# Patient Record
Sex: FEMALE | Race: WHITE | Employment: OTHER | URBAN - METROPOLITAN AREA
[De-identification: names, ages, dates, MRNs, and addresses within clinical notes are randomized per-mention and may not be internally consistent; named-entity substitution may affect disease eponyms.]

---

## 2017-02-03 PROBLEM — R26.2 UNABLE TO AMBULATE: Status: ACTIVE | Noted: 2017-02-03

## 2017-02-03 PROBLEM — I89.0 LYMPHEDEMA OF BOTH LOWER EXTREMITIES: Status: ACTIVE | Noted: 2017-02-03

## 2017-07-18 PROBLEM — I50.33 ACUTE ON CHRONIC DIASTOLIC (CONGESTIVE) HEART FAILURE (HCC): Status: ACTIVE | Noted: 2017-07-18

## 2017-11-03 PROBLEM — I48.0 PAROXYSMAL ATRIAL FIBRILLATION (HCC): Status: ACTIVE | Noted: 2017-11-03

## 2017-11-03 PROBLEM — E78.2 MIXED HYPERLIPIDEMIA: Status: ACTIVE | Noted: 2017-11-03

## 2017-11-03 PROBLEM — I50.32 CHRONIC DIASTOLIC HEART FAILURE (HCC): Status: ACTIVE | Noted: 2017-07-18

## 2017-11-03 PROBLEM — I31.39 PERICARDIAL EFFUSION: Status: ACTIVE | Noted: 2017-11-03

## 2017-11-03 PROBLEM — I44.0 FIRST DEGREE ATRIOVENTRICULAR BLOCK: Status: ACTIVE | Noted: 2017-11-03

## 2017-11-19 PROBLEM — R07.9 CHEST PAIN: Status: ACTIVE | Noted: 2017-11-19

## 2017-11-20 PROBLEM — R07.2 PRECORDIAL PAIN: Status: ACTIVE | Noted: 2017-11-19

## 2017-12-14 PROBLEM — R60.1 ANASARCA: Status: ACTIVE | Noted: 2017-12-14

## 2018-02-21 PROBLEM — R26.2 UNABLE TO AMBULATE: Status: RESOLVED | Noted: 2017-02-03 | Resolved: 2018-02-21

## 2018-03-07 PROBLEM — I48.0 PAROXYSMAL ATRIAL FIBRILLATION (HCC): Chronic | Status: ACTIVE | Noted: 2017-11-03

## 2018-03-14 ENCOUNTER — TELEPHONE (OUTPATIENT)
Dept: FAMILY MEDICINE CLINIC | Age: 73
End: 2018-03-14

## 2018-03-14 DIAGNOSIS — R73.03 PREDIABETES: ICD-10-CM

## 2018-03-14 DIAGNOSIS — I10 ESSENTIAL HYPERTENSION: Primary | Chronic | ICD-10-CM

## 2018-03-14 RX ORDER — LISINOPRIL 5 MG/1
5 TABLET ORAL DAILY
Qty: 30 TABLET | Refills: 3 | Status: SHIPPED | OUTPATIENT
Start: 2018-03-14 | End: 2018-03-14 | Stop reason: SDUPTHER

## 2018-03-14 RX ORDER — GLUCOSAMINE HCL/CHONDROITIN SU 500-400 MG
CAPSULE ORAL
Qty: 100 STRIP | Refills: 3 | Status: SHIPPED | OUTPATIENT
Start: 2018-03-14

## 2018-03-14 RX ORDER — LANCETS 30 GAUGE
EACH MISCELLANEOUS
Qty: 100 EACH | Refills: 3 | Status: ON HOLD
Start: 2018-03-14 | End: 2020-05-06 | Stop reason: HOSPADM

## 2018-03-21 ENCOUNTER — TELEPHONE (OUTPATIENT)
Dept: OCCUPATIONAL THERAPY | Age: 73
End: 2018-03-21

## 2018-03-21 NOTE — TELEPHONE ENCOUNTER
Patient no show for appointment 20March 2018. Therapist called to check on patient and to reschedule. Patient rescheduled appointment.

## 2018-03-23 ENCOUNTER — HOSPITAL ENCOUNTER (OUTPATIENT)
Age: 73
Discharge: HOME | End: 2018-03-25
Payer: MEDICARE

## 2018-03-23 ENCOUNTER — OFFICE VISIT (OUTPATIENT)
Dept: FAMILY MEDICINE CLINIC | Age: 73
End: 2018-03-23
Payer: MEDICARE

## 2018-03-23 VITALS
WEIGHT: 269 LBS | BODY MASS INDEX: 42.22 KG/M2 | RESPIRATION RATE: 20 BRPM | HEIGHT: 67 IN | DIASTOLIC BLOOD PRESSURE: 90 MMHG | SYSTOLIC BLOOD PRESSURE: 138 MMHG | HEART RATE: 57 BPM | OXYGEN SATURATION: 95 %

## 2018-03-23 DIAGNOSIS — E78.5 DYSLIPIDEMIA: Chronic | ICD-10-CM

## 2018-03-23 DIAGNOSIS — R73.03 PREDIABETES: ICD-10-CM

## 2018-03-23 DIAGNOSIS — I10 ESSENTIAL HYPERTENSION: Chronic | ICD-10-CM

## 2018-03-23 DIAGNOSIS — R60.9 PERIPHERAL EDEMA: Primary | ICD-10-CM

## 2018-03-23 DIAGNOSIS — E03.9 ACQUIRED HYPOTHYROIDISM: Chronic | ICD-10-CM

## 2018-03-23 LAB
ALBUMIN SERPL-MCNC: 4 G/DL (ref 3.5–5.2)
ALP BLD-CCNC: 79 U/L (ref 35–104)
ALT SERPL-CCNC: 12 U/L (ref 0–32)
ANION GAP SERPL CALCULATED.3IONS-SCNC: 15 MMOL/L (ref 7–16)
AST SERPL-CCNC: 14 U/L (ref 0–31)
BASOPHILS ABSOLUTE: 0.05 E9/L (ref 0–0.2)
BASOPHILS RELATIVE PERCENT: 0.7 % (ref 0–2)
BILIRUB SERPL-MCNC: 0.3 MG/DL (ref 0–1.2)
BUN BLDV-MCNC: 21 MG/DL (ref 8–23)
CALCIUM SERPL-MCNC: 9.4 MG/DL (ref 8.6–10.2)
CHLORIDE BLD-SCNC: 100 MMOL/L (ref 98–107)
CO2: 25 MMOL/L (ref 22–29)
CREAT SERPL-MCNC: 0.9 MG/DL (ref 0.5–1)
EOSINOPHILS ABSOLUTE: 0.4 E9/L (ref 0.05–0.5)
EOSINOPHILS RELATIVE PERCENT: 5.9 % (ref 0–6)
GFR AFRICAN AMERICAN: >60
GFR NON-AFRICAN AMERICAN: >60 ML/MIN/1.73
GLUCOSE BLD-MCNC: 78 MG/DL (ref 74–109)
HCT VFR BLD CALC: 42.6 % (ref 34–48)
HEMOGLOBIN: 13.6 G/DL (ref 11.5–15.5)
IMMATURE GRANULOCYTES #: 0.02 E9/L
IMMATURE GRANULOCYTES %: 0.3 % (ref 0–5)
LYMPHOCYTES ABSOLUTE: 1.29 E9/L (ref 1.5–4)
LYMPHOCYTES RELATIVE PERCENT: 18.9 % (ref 20–42)
MCH RBC QN AUTO: 29.8 PG (ref 26–35)
MCHC RBC AUTO-ENTMCNC: 31.9 % (ref 32–34.5)
MCV RBC AUTO: 93.4 FL (ref 80–99.9)
MONOCYTES ABSOLUTE: 0.61 E9/L (ref 0.1–0.95)
MONOCYTES RELATIVE PERCENT: 8.9 % (ref 2–12)
NEUTROPHILS ABSOLUTE: 4.46 E9/L (ref 1.8–7.3)
NEUTROPHILS RELATIVE PERCENT: 65.3 % (ref 43–80)
PDW BLD-RTO: 14.4 FL (ref 11.5–15)
PLATELET # BLD: 292 E9/L (ref 130–450)
PMV BLD AUTO: 9.5 FL (ref 7–12)
POTASSIUM SERPL-SCNC: 4.1 MMOL/L (ref 3.5–5)
RBC # BLD: 4.56 E12/L (ref 3.5–5.5)
SODIUM BLD-SCNC: 140 MMOL/L (ref 132–146)
TOTAL PROTEIN: 7.6 G/DL (ref 6.4–8.3)
WBC # BLD: 6.8 E9/L (ref 4.5–11.5)

## 2018-03-23 PROCEDURE — G8400 PT W/DXA NO RESULTS DOC: HCPCS | Performed by: FAMILY MEDICINE

## 2018-03-23 PROCEDURE — 36415 COLL VENOUS BLD VENIPUNCTURE: CPT

## 2018-03-23 PROCEDURE — G8427 DOCREV CUR MEDS BY ELIG CLIN: HCPCS | Performed by: FAMILY MEDICINE

## 2018-03-23 PROCEDURE — 3017F COLORECTAL CA SCREEN DOC REV: CPT | Performed by: FAMILY MEDICINE

## 2018-03-23 PROCEDURE — 99213 OFFICE O/P EST LOW 20 MIN: CPT | Performed by: FAMILY MEDICINE

## 2018-03-23 PROCEDURE — 1123F ACP DISCUSS/DSCN MKR DOCD: CPT | Performed by: FAMILY MEDICINE

## 2018-03-23 PROCEDURE — 3014F SCREEN MAMMO DOC REV: CPT | Performed by: FAMILY MEDICINE

## 2018-03-23 PROCEDURE — 4040F PNEUMOC VAC/ADMIN/RCVD: CPT | Performed by: FAMILY MEDICINE

## 2018-03-23 PROCEDURE — 1090F PRES/ABSN URINE INCON ASSESS: CPT | Performed by: FAMILY MEDICINE

## 2018-03-23 PROCEDURE — G8482 FLU IMMUNIZE ORDER/ADMIN: HCPCS | Performed by: FAMILY MEDICINE

## 2018-03-23 PROCEDURE — 84443 ASSAY THYROID STIM HORMONE: CPT

## 2018-03-23 PROCEDURE — 85025 COMPLETE CBC W/AUTO DIFF WBC: CPT

## 2018-03-23 PROCEDURE — 80053 COMPREHEN METABOLIC PANEL: CPT

## 2018-03-23 PROCEDURE — 1036F TOBACCO NON-USER: CPT | Performed by: FAMILY MEDICINE

## 2018-03-23 PROCEDURE — G8598 ASA/ANTIPLAT THER USED: HCPCS | Performed by: FAMILY MEDICINE

## 2018-03-23 PROCEDURE — G8417 CALC BMI ABV UP PARAM F/U: HCPCS | Performed by: FAMILY MEDICINE

## 2018-03-23 PROCEDURE — 84439 ASSAY OF FREE THYROXINE: CPT

## 2018-03-23 ASSESSMENT — ENCOUNTER SYMPTOMS
BLOOD IN STOOL: 0
SHORTNESS OF BREATH: 1
ABDOMINAL PAIN: 1
WHEEZING: 1
SORE THROAT: 0
BACK PAIN: 1
COUGH: 1
VOMITING: 0
TROUBLE SWALLOWING: 0

## 2018-03-23 NOTE — PATIENT INSTRUCTIONS
Patient Education        How to Read a Food Label to Limit Sodium: Care Instructions  Your Care Instructions  Sodium causes your body to hold on to extra water. This can raise your blood pressure and force your heart and kidneys to work harder. In very serious cases, this could cause you to be put in the hospital. It might even be life-threatening. By limiting sodium, you will feel better and lower your risk of serious problems. Processed foods, fast food, and restaurant foods are the major sources of dietary sodium. The most common name for sodium is salt. Try to limit how much sodium you eat to less than 2,300 milligrams (mg) a day. If you limit your sodium to 1,500 mg a day, you can lower your blood pressure even more. This limit counts all the salt that you eat in foods you cook or in packaged foods. Keep a list of everything you eat and drink. Follow-up care is a key part of your treatment and safety. Be sure to make and go to all appointments, and call your doctor if you are having problems. It's also a good idea to know your test results and keep a list of the medicines you take. How can you care for yourself at home? Read ingredient lists on food labels  · Read the list of ingredients on food labels to help you find how much sodium is in a food. The label lists the ingredients in a food in descending order (from the most to the least). If salt or sodium is high on the list, there may be a lot of sodium in the food. · Know that sodium has different names. Sodium is also called monosodium glutamate (MSG, common in [facility] food), sodium citrate, sodium alginate, sodium hydroxide, and sodium phosphate. Read Nutrition Facts labels  · On most foods, there is a Nutrition Facts label. This will tell you how much sodium is in one serving of food. Look at both the serving size and the sodium amount. The serving size is located at the top of the label, usually right under the \"Nutrition Facts\" title.  The amount

## 2018-03-23 NOTE — PROGRESS NOTES
Markell Griffin   Patient is a 67y.o. year old female who presents with:  Chief Complaint   Patient presents with    Edema     2 wk f/u      Patient also follows with: cardiology - Dr. Eliezer Guzman. HPI    Patient had sleep study scheduled 3/29/18. Edema:   3/7/18: Current treatment: Lasix 40 mg twice daily. Claims she cannot get compression stocking on therefore does not use them. Relevant history includes CKD, CHF, A. fib. States she limits sodium. Today states she tries to wrap them but has difficulty doing this, also admits that she does not keep her legs elevated. Today 3/23/18: Increased lasix to 80mg bid and referred to lymphedema clinic. Patient missed first appt 3/20/2018 and has rescheduled appt 3/27/18. Claims swelling has not improved. Tries to avoid sodium but admits to not checking nutrition labels, admits to eating canned soup, processed/prepackaged meals, is not watching sodium intake, does not use compression stockings. States a visiting nurse comes twice per week and wraps them. HTN: Current treatment: Carvedilol 3.125 mg twice daily. Recent changes in medication: None. The patient is known to have history of CAD. Last three blood pressure readings below. BP Readings from Last 3 Encounters:   03/23/18 (!) 138/90   03/07/18 (!) 146/80   02/23/18 (!) 183/98     Hypothyroidism: Current treatment: levothyroxine 200 ug daily. Recent changes in medication: None. Last lab work from 12/2017 suggests under treatment, states no change in dose was made since that time. Of note reports she takes the levothyroxine at the same time she takes any of her other medications. Most recent relevant lab results as below. Lab Results   Component Value Date    TSH 41.530 (H) 02/23/2018    TSH 48.470 (H) 02/21/2018    TSH 10.960 (H) 12/15/2017     Dyslipidemia: Current treatment: Simvastatin 40 mg daily. Recent changes in medications: none. The patient denies problems associated with the medication.

## 2018-03-24 LAB
T4 FREE: 1.13 NG/DL (ref 0.93–1.7)
TSH SERPL DL<=0.05 MIU/L-ACNC: 7.91 UIU/ML (ref 0.27–4.2)

## 2018-03-26 ENCOUNTER — TELEPHONE (OUTPATIENT)
Dept: FAMILY MEDICINE CLINIC | Age: 73
End: 2018-03-26

## 2018-03-27 ENCOUNTER — EVALUATION (OUTPATIENT)
Dept: OCCUPATIONAL THERAPY | Age: 73
End: 2018-03-27
Payer: MEDICARE

## 2018-03-27 DIAGNOSIS — I89.0 LYMPHEDEMA OF BOTH LOWER EXTREMITIES: Primary | ICD-10-CM

## 2018-03-27 PROCEDURE — 97165 OT EVAL LOW COMPLEX 30 MIN: CPT | Performed by: OCCUPATIONAL THERAPIST

## 2018-03-27 PROCEDURE — G8984 CARRY CURRENT STATUS: HCPCS | Performed by: OCCUPATIONAL THERAPIST

## 2018-03-27 PROCEDURE — G8985 CARRY GOAL STATUS: HCPCS | Performed by: OCCUPATIONAL THERAPIST

## 2018-03-28 ENCOUNTER — TELEPHONE (OUTPATIENT)
Dept: FAMILY MEDICINE CLINIC | Age: 73
End: 2018-03-28

## 2018-03-29 ENCOUNTER — HOSPITAL ENCOUNTER (OUTPATIENT)
Dept: SLEEP CENTER | Age: 73
Discharge: HOME | End: 2018-03-29

## 2018-04-05 ENCOUNTER — TELEPHONE (OUTPATIENT)
Dept: FAMILY MEDICINE CLINIC | Age: 73
End: 2018-04-05

## 2018-04-05 DIAGNOSIS — B88.8 INFESTATION BY BED BUG: Primary | ICD-10-CM

## 2018-04-05 DIAGNOSIS — Z59.9 FINANCIAL DIFFICULTIES: ICD-10-CM

## 2018-04-05 SDOH — ECONOMIC STABILITY - INCOME SECURITY: PROBLEM RELATED TO HOUSING AND ECONOMIC CIRCUMSTANCES, UNSPECIFIED: Z59.9

## 2018-04-13 ENCOUNTER — TELEPHONE (OUTPATIENT)
Dept: FAMILY MEDICINE CLINIC | Age: 73
End: 2018-04-13

## 2018-04-20 ENCOUNTER — TELEPHONE (OUTPATIENT)
Dept: FAMILY MEDICINE CLINIC | Age: 73
End: 2018-04-20

## 2018-04-20 DIAGNOSIS — Z59.9 FINANCIAL DIFFICULTIES: ICD-10-CM

## 2018-04-20 DIAGNOSIS — Z78.9 POOR PHYSICAL HEALTH: ICD-10-CM

## 2018-04-20 DIAGNOSIS — B88.8 INFESTATION BY BED BUG: Primary | ICD-10-CM

## 2018-04-20 SDOH — ECONOMIC STABILITY - INCOME SECURITY: PROBLEM RELATED TO HOUSING AND ECONOMIC CIRCUMSTANCES, UNSPECIFIED: Z59.9

## 2018-07-10 ENCOUNTER — TELEPHONE (OUTPATIENT)
Dept: FAMILY MEDICINE CLINIC | Age: 73
End: 2018-07-10

## 2018-07-10 NOTE — TELEPHONE ENCOUNTER
LSW had previously received referral on patient due to bed bug infestation, but there are no services in the community to assist with bed bug extermination and recalled relaying that information to PCP office RN during a phone call regarding another patient but did not note in chart. History of same concerns with patient when LSW was last involved in August, when LSW made referral to Adult Protective Services. However, APS informed LSW they were not going to be involved with patient. LSW reviewed patient chart again in addition to spouse's chart.

## 2018-07-11 NOTE — TELEPHONE ENCOUNTER
LSW attempted phone call to [address] [place] [address] on Aging regarding any possible resources. [employer] department busy on other calls. Left message to return phone call to W.

## 2018-08-09 ENCOUNTER — OFFICE VISIT (OUTPATIENT)
Dept: CARDIOLOGY CLINIC | Age: 73
End: 2018-08-09
Payer: MEDICARE

## 2018-08-09 VITALS — HEIGHT: 66 IN | HEART RATE: 65 BPM | DIASTOLIC BLOOD PRESSURE: 80 MMHG | SYSTOLIC BLOOD PRESSURE: 130 MMHG

## 2018-08-09 DIAGNOSIS — I25.10 CORONARY ARTERY DISEASE INVOLVING NATIVE CORONARY ARTERY OF NATIVE HEART WITHOUT ANGINA PECTORIS: Primary | ICD-10-CM

## 2018-08-09 DIAGNOSIS — I48.0 PAROXYSMAL ATRIAL FIBRILLATION (HCC): Chronic | ICD-10-CM

## 2018-08-09 DIAGNOSIS — I50.32 CHRONIC DIASTOLIC HEART FAILURE (HCC): ICD-10-CM

## 2018-08-09 PROCEDURE — 3017F COLORECTAL CA SCREEN DOC REV: CPT | Performed by: NURSE PRACTITIONER

## 2018-08-09 PROCEDURE — G8400 PT W/DXA NO RESULTS DOC: HCPCS | Performed by: NURSE PRACTITIONER

## 2018-08-09 PROCEDURE — 93000 ELECTROCARDIOGRAM COMPLETE: CPT | Performed by: INTERNAL MEDICINE

## 2018-08-09 PROCEDURE — 99213 OFFICE O/P EST LOW 20 MIN: CPT | Performed by: NURSE PRACTITIONER

## 2018-08-09 PROCEDURE — 1123F ACP DISCUSS/DSCN MKR DOCD: CPT | Performed by: NURSE PRACTITIONER

## 2018-08-09 PROCEDURE — 4040F PNEUMOC VAC/ADMIN/RCVD: CPT | Performed by: NURSE PRACTITIONER

## 2018-08-09 PROCEDURE — G8427 DOCREV CUR MEDS BY ELIG CLIN: HCPCS | Performed by: NURSE PRACTITIONER

## 2018-08-09 PROCEDURE — 1101F PT FALLS ASSESS-DOCD LE1/YR: CPT | Performed by: NURSE PRACTITIONER

## 2018-08-09 PROCEDURE — 1036F TOBACCO NON-USER: CPT | Performed by: NURSE PRACTITIONER

## 2018-08-09 PROCEDURE — 1090F PRES/ABSN URINE INCON ASSESS: CPT | Performed by: NURSE PRACTITIONER

## 2018-08-09 PROCEDURE — G8417 CALC BMI ABV UP PARAM F/U: HCPCS | Performed by: NURSE PRACTITIONER

## 2018-08-09 PROCEDURE — G8598 ASA/ANTIPLAT THER USED: HCPCS | Performed by: NURSE PRACTITIONER

## 2018-08-28 ENCOUNTER — APPOINTMENT (OUTPATIENT)
Dept: CT IMAGING | Age: 73
DRG: 292 | End: 2018-08-28
Payer: MEDICARE

## 2018-08-28 ENCOUNTER — APPOINTMENT (OUTPATIENT)
Dept: GENERAL RADIOLOGY | Age: 73
DRG: 292 | End: 2018-08-28
Payer: MEDICARE

## 2018-08-28 ENCOUNTER — HOSPITAL ENCOUNTER (INPATIENT)
Age: 73
LOS: 3 days | Discharge: NURSING, REHAB OR LONG-TERM CARE | DRG: 292 | End: 2018-09-01
Attending: EMERGENCY MEDICINE | Admitting: INTERNAL MEDICINE
Payer: MEDICARE

## 2018-08-28 DIAGNOSIS — N39.0 URINARY TRACT INFECTION WITHOUT HEMATURIA, SITE UNSPECIFIED: Primary | ICD-10-CM

## 2018-08-28 DIAGNOSIS — R53.83 FATIGUE, UNSPECIFIED TYPE: ICD-10-CM

## 2018-08-28 DIAGNOSIS — R06.00 DYSPNEA, UNSPECIFIED TYPE: ICD-10-CM

## 2018-08-28 DIAGNOSIS — R29.6 FREQUENT FALLS: ICD-10-CM

## 2018-08-28 DIAGNOSIS — I89.0 LYMPHEDEMA: ICD-10-CM

## 2018-08-28 LAB
ANION GAP SERPL CALCULATED.3IONS-SCNC: 13 MMOL/L (ref 7–16)
BASOPHILS ABSOLUTE: 0.06 E9/L (ref 0–0.2)
BASOPHILS RELATIVE PERCENT: 0.6 % (ref 0–2)
BUN BLDV-MCNC: 15 MG/DL (ref 8–23)
CALCIUM SERPL-MCNC: 9.4 MG/DL (ref 8.6–10.2)
CHLORIDE BLD-SCNC: 100 MMOL/L (ref 98–107)
CO2: 27 MMOL/L (ref 22–29)
CREAT SERPL-MCNC: 1.1 MG/DL (ref 0.5–1)
EKG ATRIAL RATE: 82 BPM
EKG P AXIS: 48 DEGREES
EKG P-R INTERVAL: 220 MS
EKG Q-T INTERVAL: 410 MS
EKG QRS DURATION: 100 MS
EKG QTC CALCULATION (BAZETT): 479 MS
EKG R AXIS: -27 DEGREES
EKG T AXIS: 128 DEGREES
EKG VENTRICULAR RATE: 82 BPM
EOSINOPHILS ABSOLUTE: 0.21 E9/L (ref 0.05–0.5)
EOSINOPHILS RELATIVE PERCENT: 2.1 % (ref 0–6)
GFR AFRICAN AMERICAN: 59
GFR NON-AFRICAN AMERICAN: 49 ML/MIN/1.73
GLUCOSE BLD-MCNC: 111 MG/DL (ref 74–109)
HCT VFR BLD CALC: 44 % (ref 34–48)
HEMOGLOBIN: 14.7 G/DL (ref 11.5–15.5)
IMMATURE GRANULOCYTES #: 0.08 E9/L
IMMATURE GRANULOCYTES %: 0.8 % (ref 0–5)
LACTIC ACID: 0.7 MMOL/L (ref 0.5–2.2)
LYMPHOCYTES ABSOLUTE: 1.31 E9/L (ref 1.5–4)
LYMPHOCYTES RELATIVE PERCENT: 12.9 % (ref 20–42)
MCH RBC QN AUTO: 30.8 PG (ref 26–35)
MCHC RBC AUTO-ENTMCNC: 33.4 % (ref 32–34.5)
MCV RBC AUTO: 92.1 FL (ref 80–99.9)
MONOCYTES ABSOLUTE: 1.02 E9/L (ref 0.1–0.95)
MONOCYTES RELATIVE PERCENT: 10 % (ref 2–12)
NEUTROPHILS ABSOLUTE: 7.5 E9/L (ref 1.8–7.3)
NEUTROPHILS RELATIVE PERCENT: 73.6 % (ref 43–80)
PDW BLD-RTO: 13.3 FL (ref 11.5–15)
PLATELET # BLD: 308 E9/L (ref 130–450)
PMV BLD AUTO: 10.4 FL (ref 7–12)
POTASSIUM SERPL-SCNC: 3.6 MMOL/L (ref 3.5–5)
PRO-BNP: 303 PG/ML (ref 0–450)
RBC # BLD: 4.78 E12/L (ref 3.5–5.5)
SODIUM BLD-SCNC: 140 MMOL/L (ref 132–146)
TOTAL CK: 47 U/L (ref 20–180)
TROPONIN: <0.01 NG/ML (ref 0–0.03)
WBC # BLD: 10.2 E9/L (ref 4.5–11.5)

## 2018-08-28 PROCEDURE — 85025 COMPLETE CBC W/AUTO DIFF WBC: CPT

## 2018-08-28 PROCEDURE — 36415 COLL VENOUS BLD VENIPUNCTURE: CPT

## 2018-08-28 PROCEDURE — 80048 BASIC METABOLIC PNL TOTAL CA: CPT

## 2018-08-28 PROCEDURE — 71045 X-RAY EXAM CHEST 1 VIEW: CPT

## 2018-08-28 PROCEDURE — 83605 ASSAY OF LACTIC ACID: CPT

## 2018-08-28 PROCEDURE — 93005 ELECTROCARDIOGRAM TRACING: CPT

## 2018-08-28 PROCEDURE — 83880 ASSAY OF NATRIURETIC PEPTIDE: CPT

## 2018-08-28 PROCEDURE — 70450 CT HEAD/BRAIN W/O DYE: CPT

## 2018-08-28 PROCEDURE — 99285 EMERGENCY DEPT VISIT HI MDM: CPT

## 2018-08-28 PROCEDURE — 84484 ASSAY OF TROPONIN QUANT: CPT

## 2018-08-28 PROCEDURE — 81001 URINALYSIS AUTO W/SCOPE: CPT

## 2018-08-28 PROCEDURE — 82550 ASSAY OF CK (CPK): CPT

## 2018-08-28 ASSESSMENT — ENCOUNTER SYMPTOMS
DIARRHEA: 0
BACK PAIN: 0
COUGH: 1
SORE THROAT: 0
EYE REDNESS: 0
SHORTNESS OF BREATH: 1
VOMITING: 0
ABDOMINAL PAIN: 0
NAUSEA: 0
WHEEZING: 0

## 2018-08-28 ASSESSMENT — PAIN DESCRIPTION - PAIN TYPE: TYPE: ACUTE PAIN

## 2018-08-28 ASSESSMENT — PAIN DESCRIPTION - DESCRIPTORS: DESCRIPTORS: ACHING

## 2018-08-28 ASSESSMENT — PAIN SCALES - GENERAL: PAINLEVEL_OUTOF10: 8

## 2018-08-29 PROBLEM — N39.0 UTI (URINARY TRACT INFECTION): Status: ACTIVE | Noted: 2018-08-29

## 2018-08-29 LAB
ANION GAP SERPL CALCULATED.3IONS-SCNC: 12 MMOL/L (ref 7–16)
BACTERIA: ABNORMAL /HPF
BILIRUBIN URINE: NEGATIVE
BLOOD, URINE: ABNORMAL
BUN BLDV-MCNC: 13 MG/DL (ref 8–23)
CALCIUM SERPL-MCNC: 9.1 MG/DL (ref 8.6–10.2)
CHLORIDE BLD-SCNC: 105 MMOL/L (ref 98–107)
CLARITY: CLEAR
CO2: 25 MMOL/L (ref 22–29)
COLOR: YELLOW
CREAT SERPL-MCNC: 0.9 MG/DL (ref 0.5–1)
EPITHELIAL CELLS, UA: ABNORMAL /HPF
GFR AFRICAN AMERICAN: >60
GFR NON-AFRICAN AMERICAN: >60 ML/MIN/1.73
GLUCOSE BLD-MCNC: 110 MG/DL (ref 74–109)
GLUCOSE URINE: NEGATIVE MG/DL
KETONES, URINE: NEGATIVE MG/DL
LEUKOCYTE ESTERASE, URINE: ABNORMAL
METER GLUCOSE: 113 MG/DL (ref 70–110)
NITRITE, URINE: POSITIVE
PH UA: 6 (ref 5–9)
POTASSIUM SERPL-SCNC: 3.7 MMOL/L (ref 3.5–5)
PROTEIN UA: NEGATIVE MG/DL
RBC UA: ABNORMAL /HPF (ref 0–2)
SODIUM BLD-SCNC: 142 MMOL/L (ref 132–146)
SPECIFIC GRAVITY UA: <=1.005 (ref 1–1.03)
UROBILINOGEN, URINE: 1 E.U./DL
WBC UA: ABNORMAL /HPF (ref 0–5)

## 2018-08-29 PROCEDURE — 80048 BASIC METABOLIC PNL TOTAL CA: CPT

## 2018-08-29 PROCEDURE — 2580000003 HC RX 258: Performed by: EMERGENCY MEDICINE

## 2018-08-29 PROCEDURE — 6370000000 HC RX 637 (ALT 250 FOR IP): Performed by: INTERNAL MEDICINE

## 2018-08-29 PROCEDURE — 36415 COLL VENOUS BLD VENIPUNCTURE: CPT

## 2018-08-29 PROCEDURE — 6360000002 HC RX W HCPCS: Performed by: INTERNAL MEDICINE

## 2018-08-29 PROCEDURE — G0378 HOSPITAL OBSERVATION PER HR: HCPCS

## 2018-08-29 PROCEDURE — 82962 GLUCOSE BLOOD TEST: CPT

## 2018-08-29 PROCEDURE — 1200000000 HC SEMI PRIVATE

## 2018-08-29 PROCEDURE — 6360000002 HC RX W HCPCS: Performed by: EMERGENCY MEDICINE

## 2018-08-29 PROCEDURE — 2580000003 HC RX 258: Performed by: INTERNAL MEDICINE

## 2018-08-29 RX ORDER — POTASSIUM CHLORIDE 750 MG/1
10 TABLET, EXTENDED RELEASE ORAL ONCE
Status: COMPLETED | OUTPATIENT
Start: 2018-08-29 | End: 2018-08-29

## 2018-08-29 RX ORDER — AMLODIPINE BESYLATE 5 MG/1
5 TABLET ORAL DAILY
Status: DISCONTINUED | OUTPATIENT
Start: 2018-08-29 | End: 2018-09-01 | Stop reason: HOSPADM

## 2018-08-29 RX ORDER — SODIUM CHLORIDE 9 MG/ML
INJECTION, SOLUTION INTRAVENOUS CONTINUOUS
Status: DISCONTINUED | OUTPATIENT
Start: 2018-08-29 | End: 2018-08-29

## 2018-08-29 RX ORDER — FUROSEMIDE 10 MG/ML
40 INJECTION INTRAMUSCULAR; INTRAVENOUS 2 TIMES DAILY
Status: DISCONTINUED | OUTPATIENT
Start: 2018-08-29 | End: 2018-08-30

## 2018-08-29 RX ADMIN — POTASSIUM CHLORIDE 10 MEQ: 10 TABLET, EXTENDED RELEASE ORAL at 06:03

## 2018-08-29 RX ADMIN — CLONIDINE HYDROCHLORIDE 0.3 MG: 0.2 TABLET ORAL at 23:21

## 2018-08-29 RX ADMIN — CEFTRIAXONE 2 G: 2 INJECTION, POWDER, FOR SOLUTION INTRAMUSCULAR; INTRAVENOUS at 01:22

## 2018-08-29 RX ADMIN — SODIUM CHLORIDE: 9 INJECTION, SOLUTION INTRAVENOUS at 06:03

## 2018-08-29 RX ADMIN — WATER 1 G: 1 INJECTION INTRAMUSCULAR; INTRAVENOUS; SUBCUTANEOUS at 18:33

## 2018-08-29 RX ADMIN — AMLODIPINE BESYLATE 5 MG: 5 TABLET ORAL at 20:20

## 2018-08-29 RX ADMIN — FUROSEMIDE 40 MG: 10 INJECTION, SOLUTION INTRAMUSCULAR; INTRAVENOUS at 18:33

## 2018-08-29 ASSESSMENT — PAIN SCALES - GENERAL
PAINLEVEL_OUTOF10: 0

## 2018-08-29 NOTE — H&P
Subjective: The patient is awake but confused. Unable to ambulate. Denies chest pain, angina, and dyspnea. Denies abdominal pain. Tolerating diet. No nausea or vomiting.     Objective:    BP (!) 180/104   Pulse 77   Temp 97.7 °F (36.5 °C) (Oral)   Resp 16   Ht 5' 6\" (1.676 m)   Wt 232 lb 5 oz (105.4 kg)   SpO2 96%   BMI 37.50 kg/m²   Head and Neck: normal atraumatic, no neck masses, normal thyroid, positive  jvd  Heart:  regular rate and rhythm, S1, S2 normal, no murmur, click, rub or gallop  Lungs:  chest , rales, normal symmetric air entry, pulse oximetry on room air is 96%, no chest wall deformities or tenderness  Abd: soft, non-tender, without masses or organomegaly  Extrem:  No clubbing, cyanosis, but 3plus pitting edema edema  Neuro:Normal, no focal neurological deficit and DTRs (+) delayed relaxation phase    CBC:   Lab Results   Component Value Date    WBC 10.2 08/28/2018    RBC 4.78 08/28/2018    HGB 14.7 08/28/2018    HCT 44.0 08/28/2018    MCV 92.1 08/28/2018    MCH 30.8 08/28/2018    MCHC 33.4 08/28/2018    RDW 13.3 08/28/2018     08/28/2018    MPV 10.4 08/28/2018     CMP:    Lab Results   Component Value Date     08/29/2018    K 3.7 08/29/2018     08/29/2018    CO2 25 08/29/2018    BUN 13 08/29/2018    CREATININE 0.9 08/29/2018    GFRAA >60 08/29/2018    LABGLOM >60 08/29/2018    GLUCOSE 110 08/29/2018    GLUCOSE 91 04/10/2012    PROT 7.6 03/23/2018    LABALBU 4.0 03/23/2018    LABALBU 4.1 04/10/2012    CALCIUM 9.1 08/29/2018    BILITOT 0.3 03/23/2018    ALKPHOS 79 03/23/2018    AST 14 03/23/2018    ALT 12 03/23/2018        Assessment:    Patient Active Problem List   Diagnosis Code    Essential hypertension I10    Coronary artery disease involving native coronary artery without angina pectoris I25.10    Depression F32.9    Dyslipidemia E78.5    Restless leg syndrome G25.81    Hypothyroidism E03.9    History of thyroid cancer Z85.850    Peripheral edema R60.9    Chronic kidney disease, stage III (moderate) N18.3    Morbid obesity (HCC) E66.01    Left upper extremity numbness R20.0    Lymphedema of both lower extremities I89.0    Cellulitis L03.90    Chronic diastolic heart failure (HCC) I50.32    Paroxysmal atrial fibrillation (HCC) I48.0    First degree atrioventricular block I44.0    Pericardial effusion I31.3    Precordial pain R07.2    Anasarca R60.1    Prediabetes R73.03    UTI (urinary tract infection) N39.0         Plan:   Iv antibiotics  Treat chf  Pt and ot  Resume home meds as pt was not taking her meds    Helena Duckworth  5:38 PM  8/29/2018

## 2018-08-29 NOTE — ED PROVIDER NOTES
hernia repair; Gallbladder surgery; Thyroid surgery; Thyroidectomy; Cholecystectomy; hernia repair; Echo Complete (1/20/2014 EF62%); Diagnostic Cardiac Cath Lab Procedure (09/10/2009); Diagnostic Cardiac Cath Lab Procedure (10/11/2007); Diagnostic Cardiac Cath Lab Procedure (09/27/2006); and Diagnostic Cardiac Cath Lab Procedure (05/23/2003). Social History:  reports that she has never smoked. She has never used smokeless tobacco. She reports that she does not drink alcohol or use drugs. Family History: family history includes Heart Disease (age of onset: 46) in her father; Heart Failure in her brother; Stroke in her mother. The patients home medications have been reviewed. Allergies: Patient has no known allergies. -------------------------------------------------- RESULTS -------------------------------------------------      EKG Interpretation    Interpreted by emergency department physician    Rhythm: normal sinus  and 1 degree AV block  Rate: 82  Axis: LAD  Ectopy: none  Conduction: normal  ST Segments: nonspecific changes  T Waves: inversion in  v4, v5 and v6  Q Waves: nonspecific    Clinical Impression: Sinus rhythm with first-degree AV block and nonspecific changes. Compared with prior EKG on 8/09/18 and there is no significant change.     Jazmin Neumann DO PGY 4      Lab  Results for orders placed or performed during the hospital encounter of 08/28/18   CBC auto differential   Result Value Ref Range    WBC 10.2 4.5 - 11.5 E9/L    RBC 4.78 3.50 - 5.50 E12/L    Hemoglobin 14.7 11.5 - 15.5 g/dL    Hematocrit 44.0 34.0 - 48.0 %    MCV 92.1 80.0 - 99.9 fL    MCH 30.8 26.0 - 35.0 pg    MCHC 33.4 32.0 - 34.5 %    RDW 13.3 11.5 - 15.0 fL    Platelets 753 610 - 589 E9/L    MPV 10.4 7.0 - 12.0 fL    Neutrophils % 73.6 43.0 - 80.0 %    Immature Granulocytes % 0.8 0.0 - 5.0 %    Lymphocytes % 12.9 (L) 20.0 - 42.0 %    Monocytes % 10.0 2.0 - 12.0 %    Eosinophils % 2.1 0.0 - 6.0 %    Basophils % 0.6 0.0 - 2.0 %    Neutrophils # 7.50 (H) 1.80 - 7.30 E9/L    Immature Granulocytes # 0.08 E9/L    Lymphocytes # 1.31 (L) 1.50 - 4.00 E9/L    Monocytes # 1.02 (H) 0.10 - 0.95 E9/L    Eosinophils # 0.21 0.05 - 0.50 E9/L    Basophils # 0.06 0.00 - 0.20 E9/L   Lactic Acid, Plasma   Result Value Ref Range    Lactic Acid 0.7 0.5 - 2.2 mmol/L   Urinalysis with Microscopic   Result Value Ref Range    Color, UA Yellow Straw/Yellow    Clarity, UA Clear Clear    Glucose, Ur Negative Negative mg/dL    Bilirubin Urine Negative Negative    Ketones, Urine Negative Negative mg/dL    Specific Gravity, UA <=1.005 1.005 - 1.030    Blood, Urine TRACE (A) Negative    pH, UA 6.0 5.0 - 9.0    Protein, UA Negative Negative mg/dL    Urobilinogen, Urine 1.0 <2.0 E.U./dL    Nitrite, Urine POSITIVE (A) Negative    Leukocyte Esterase, Urine MODERATE (A) Negative    WBC, UA 10-20 (A) 0 - 5 /HPF    RBC, UA 1-3 0 - 2 /HPF    Epi Cells FEW /HPF    Bacteria, UA MANY (A) /HPF   Basic Metabolic Panel   Result Value Ref Range    Sodium 140 132 - 146 mmol/L    Potassium 3.6 3.5 - 5.0 mmol/L    Chloride 100 98 - 107 mmol/L    CO2 27 22 - 29 mmol/L    Anion Gap 13 7 - 16 mmol/L    Glucose 111 (H) 74 - 109 mg/dL    BUN 15 8 - 23 mg/dL    CREATININE 1.1 (H) 0.5 - 1.0 mg/dL    GFR Non-African American 49 >=60 mL/min/1.73    GFR African American 59     Calcium 9.4 8.6 - 10.2 mg/dL   Troponin   Result Value Ref Range    Troponin <0.01 0.00 - 0.03 ng/mL   Brain Natriuretic Peptide   Result Value Ref Range    Pro- 0 - 450 pg/mL   CK   Result Value Ref Range    Total CK 47 20 - 180 U/L   EKG 12 Lead   Result Value Ref Range    Ventricular Rate 82 BPM    Atrial Rate 82 BPM    P-R Interval 220 ms    QRS Duration 100 ms    Q-T Interval 410 ms    QTc Calculation (Bazett) 479 ms    P Axis 48 degrees    R Axis -27 degrees    T Axis 128 degrees       Radiology  Ct Head Wo Contrast    Result Date: 8/28/2018  Location:200 Exam: CT HEAD WO CONTRAST Comparison: 9/3/2016

## 2018-08-30 LAB
ANION GAP SERPL CALCULATED.3IONS-SCNC: 15 MMOL/L (ref 7–16)
BASOPHILS ABSOLUTE: 0.04 E9/L (ref 0–0.2)
BASOPHILS RELATIVE PERCENT: 0.3 % (ref 0–2)
BUN BLDV-MCNC: 13 MG/DL (ref 8–23)
CALCIUM SERPL-MCNC: 10.3 MG/DL (ref 8.6–10.2)
CHLORIDE BLD-SCNC: 102 MMOL/L (ref 98–107)
CO2: 22 MMOL/L (ref 22–29)
CREAT SERPL-MCNC: 1 MG/DL (ref 0.5–1)
EOSINOPHILS ABSOLUTE: 0.14 E9/L (ref 0.05–0.5)
EOSINOPHILS RELATIVE PERCENT: 1.2 % (ref 0–6)
GFR AFRICAN AMERICAN: >60
GFR NON-AFRICAN AMERICAN: 54 ML/MIN/1.73
GLUCOSE BLD-MCNC: 124 MG/DL (ref 74–109)
HCT VFR BLD CALC: 40.6 % (ref 34–48)
HEMOGLOBIN: 14 G/DL (ref 11.5–15.5)
IMMATURE GRANULOCYTES #: 0.04 E9/L
IMMATURE GRANULOCYTES %: 0.3 % (ref 0–5)
LYMPHOCYTES ABSOLUTE: 0.95 E9/L (ref 1.5–4)
LYMPHOCYTES RELATIVE PERCENT: 8.2 % (ref 20–42)
MCH RBC QN AUTO: 31 PG (ref 26–35)
MCHC RBC AUTO-ENTMCNC: 34.5 % (ref 32–34.5)
MCV RBC AUTO: 90 FL (ref 80–99.9)
MONOCYTES ABSOLUTE: 0.98 E9/L (ref 0.1–0.95)
MONOCYTES RELATIVE PERCENT: 8.4 % (ref 2–12)
NEUTROPHILS ABSOLUTE: 9.46 E9/L (ref 1.8–7.3)
NEUTROPHILS RELATIVE PERCENT: 81.6 % (ref 43–80)
PDW BLD-RTO: 13.5 FL (ref 11.5–15)
PLATELET # BLD: 256 E9/L (ref 130–450)
PMV BLD AUTO: 9.4 FL (ref 7–12)
POTASSIUM SERPL-SCNC: 3.7 MMOL/L (ref 3.5–5)
RBC # BLD: 4.51 E12/L (ref 3.5–5.5)
SODIUM BLD-SCNC: 139 MMOL/L (ref 132–146)
WBC # BLD: 11.6 E9/L (ref 4.5–11.5)

## 2018-08-30 PROCEDURE — 80048 BASIC METABOLIC PNL TOTAL CA: CPT

## 2018-08-30 PROCEDURE — 87088 URINE BACTERIA CULTURE: CPT

## 2018-08-30 PROCEDURE — G8988 SELF CARE GOAL STATUS: HCPCS

## 2018-08-30 PROCEDURE — G8979 MOBILITY GOAL STATUS: HCPCS | Performed by: PHYSICAL THERAPIST

## 2018-08-30 PROCEDURE — G0378 HOSPITAL OBSERVATION PER HR: HCPCS

## 2018-08-30 PROCEDURE — 94761 N-INVAS EAR/PLS OXIMETRY MLT: CPT

## 2018-08-30 PROCEDURE — 36415 COLL VENOUS BLD VENIPUNCTURE: CPT

## 2018-08-30 PROCEDURE — 97166 OT EVAL MOD COMPLEX 45 MIN: CPT

## 2018-08-30 PROCEDURE — 97535 SELF CARE MNGMENT TRAINING: CPT

## 2018-08-30 PROCEDURE — G8987 SELF CARE CURRENT STATUS: HCPCS

## 2018-08-30 PROCEDURE — 1200000000 HC SEMI PRIVATE

## 2018-08-30 PROCEDURE — 6370000000 HC RX 637 (ALT 250 FOR IP): Performed by: INTERNAL MEDICINE

## 2018-08-30 PROCEDURE — 6360000002 HC RX W HCPCS: Performed by: INTERNAL MEDICINE

## 2018-08-30 PROCEDURE — G8978 MOBILITY CURRENT STATUS: HCPCS | Performed by: PHYSICAL THERAPIST

## 2018-08-30 PROCEDURE — 97162 PT EVAL MOD COMPLEX 30 MIN: CPT | Performed by: PHYSICAL THERAPIST

## 2018-08-30 PROCEDURE — 85025 COMPLETE CBC W/AUTO DIFF WBC: CPT

## 2018-08-30 PROCEDURE — 2580000003 HC RX 258: Performed by: INTERNAL MEDICINE

## 2018-08-30 RX ORDER — HYDROCODONE BITARTRATE AND ACETAMINOPHEN 5; 325 MG/1; MG/1
1 TABLET ORAL EVERY 4 HOURS PRN
Status: DISCONTINUED | OUTPATIENT
Start: 2018-08-30 | End: 2018-09-01 | Stop reason: HOSPADM

## 2018-08-30 RX ORDER — GABAPENTIN 300 MG/1
300 CAPSULE ORAL NIGHTLY
Status: DISCONTINUED | OUTPATIENT
Start: 2018-08-30 | End: 2018-09-01 | Stop reason: HOSPADM

## 2018-08-30 RX ORDER — NITROGLYCERIN 0.4 MG/1
0.4 TABLET SUBLINGUAL EVERY 5 MIN PRN
Status: ON HOLD | COMMUNITY
End: 2020-05-06 | Stop reason: HOSPADM

## 2018-08-30 RX ORDER — ROPINIROLE 1 MG/1
1 TABLET, FILM COATED ORAL 3 TIMES DAILY
Status: DISCONTINUED | OUTPATIENT
Start: 2018-08-30 | End: 2018-09-01 | Stop reason: HOSPADM

## 2018-08-30 RX ORDER — LEVOTHYROXINE SODIUM 0.1 MG/1
200 TABLET ORAL
Status: DISCONTINUED | OUTPATIENT
Start: 2018-08-31 | End: 2018-09-01 | Stop reason: HOSPADM

## 2018-08-30 RX ORDER — SPIRONOLACTONE 25 MG/1
25 TABLET ORAL DAILY
Status: ON HOLD | COMMUNITY
End: 2020-05-05

## 2018-08-30 RX ORDER — FUROSEMIDE 40 MG/1
80 TABLET ORAL 2 TIMES DAILY
Status: DISCONTINUED | OUTPATIENT
Start: 2018-08-30 | End: 2018-09-01 | Stop reason: HOSPADM

## 2018-08-30 RX ORDER — POTASSIUM CHLORIDE 750 MG/1
10 TABLET, EXTENDED RELEASE ORAL DAILY
Status: DISCONTINUED | OUTPATIENT
Start: 2018-08-30 | End: 2018-09-01 | Stop reason: HOSPADM

## 2018-08-30 RX ORDER — METOPROLOL SUCCINATE 50 MG/1
50 TABLET, EXTENDED RELEASE ORAL DAILY
Status: DISCONTINUED | OUTPATIENT
Start: 2018-08-30 | End: 2018-09-01 | Stop reason: HOSPADM

## 2018-08-30 RX ORDER — SPIRONOLACTONE 25 MG/1
25 TABLET ORAL DAILY
Status: DISCONTINUED | OUTPATIENT
Start: 2018-08-30 | End: 2018-09-01 | Stop reason: HOSPADM

## 2018-08-30 RX ORDER — SIMVASTATIN 40 MG
40 TABLET ORAL NIGHTLY
Status: DISCONTINUED | OUTPATIENT
Start: 2018-08-30 | End: 2018-09-01 | Stop reason: HOSPADM

## 2018-08-30 RX ORDER — HYDROCODONE BITARTRATE AND ACETAMINOPHEN 5; 325 MG/1; MG/1
1 TABLET ORAL EVERY 4 HOURS PRN
COMMUNITY
End: 2019-02-22

## 2018-08-30 RX ORDER — ALLOPURINOL 100 MG/1
100 TABLET ORAL DAILY
Status: DISCONTINUED | OUTPATIENT
Start: 2018-08-30 | End: 2018-09-01 | Stop reason: HOSPADM

## 2018-08-30 RX ORDER — HYDROXYZINE HYDROCHLORIDE 25 MG/1
25 TABLET, FILM COATED ORAL EVERY 6 HOURS PRN
Status: DISCONTINUED | OUTPATIENT
Start: 2018-08-30 | End: 2018-08-31

## 2018-08-30 RX ORDER — ALLOPURINOL 100 MG/1
100 TABLET ORAL DAILY
COMMUNITY

## 2018-08-30 RX ADMIN — SIMVASTATIN 40 MG: 40 TABLET, FILM COATED ORAL at 20:48

## 2018-08-30 RX ADMIN — ALLOPURINOL 100 MG: 100 TABLET ORAL at 17:45

## 2018-08-30 RX ADMIN — AMLODIPINE BESYLATE 5 MG: 5 TABLET ORAL at 10:18

## 2018-08-30 RX ADMIN — FUROSEMIDE 80 MG: 40 TABLET ORAL at 17:45

## 2018-08-30 RX ADMIN — CLONIDINE HYDROCHLORIDE 0.3 MG: 0.2 TABLET ORAL at 20:48

## 2018-08-30 RX ADMIN — FUROSEMIDE 40 MG: 10 INJECTION, SOLUTION INTRAMUSCULAR; INTRAVENOUS at 10:18

## 2018-08-30 RX ADMIN — GABAPENTIN 300 MG: 300 CAPSULE ORAL at 20:48

## 2018-08-30 RX ADMIN — ROPINIROLE HYDROCHLORIDE 1 MG: 1 TABLET, FILM COATED ORAL at 17:46

## 2018-08-30 RX ADMIN — RIVAROXABAN 20 MG: 20 TABLET, FILM COATED ORAL at 17:46

## 2018-08-30 RX ADMIN — ROPINIROLE HYDROCHLORIDE 1 MG: 1 TABLET, FILM COATED ORAL at 20:48

## 2018-08-30 RX ADMIN — POTASSIUM CHLORIDE 10 MEQ: 750 TABLET, EXTENDED RELEASE ORAL at 17:45

## 2018-08-30 RX ADMIN — SPIRONOLACTONE 25 MG: 25 TABLET, FILM COATED ORAL at 17:46

## 2018-08-30 RX ADMIN — METOPROLOL SUCCINATE 50 MG: 50 TABLET, EXTENDED RELEASE ORAL at 17:45

## 2018-08-30 RX ADMIN — WATER 1 G: 1 INJECTION INTRAMUSCULAR; INTRAVENOUS; SUBCUTANEOUS at 17:45

## 2018-08-30 ASSESSMENT — PAIN SCALES - GENERAL
PAINLEVEL_OUTOF10: 0

## 2018-08-30 NOTE — PROGRESS NOTES
Occupational Therapy  Occupational Therapy Initial Assessment    Date:2018  Patient Name: Gregory Rainey  MRN: [id number]  : 1945  ROOM #: [id number]    Placement recommendation: subacute    Equipment prescriptions needed:     AM-PAC Daily Activity Inpatient   How much help for putting on and taking off regular lower body clothing?: Total  How much help for Bathing?: Total  How much help for Toileting?: Total  How much help for putting on and taking off regular upper body clothing?: A Lot  How much help for taking care of personal grooming?: A Lot  How much help for eating meals?: A Lot  AM-[facility] Inpatient Daily Activity Raw Score: 9  AM-PAC Inpatient ADL T-Scale Score : 25.33  ADL Inpatient CMS 0-100% Score: 79.59  ADL Inpatient CMS G-Code Modifier : CL      Diagnosis / Problem List:   1. Urinary tract infection without hematuria, site unspecified    2. Frequent falls    3. Fatigue, unspecified type    4. Lymphedema    5.  Dyspnea, unspecified type       Patient Active Problem List   Diagnosis    Essential hypertension    Coronary artery disease involving native coronary artery without angina pectoris    Depression    Dyslipidemia    Restless leg syndrome    Hypothyroidism    History of thyroid cancer    Peripheral edema    Chronic kidney disease, stage III (moderate)    Morbid obesity (HCC)    Left upper extremity numbness    Lymphedema of both lower extremities    Cellulitis    Chronic diastolic heart failure (HCC)    Paroxysmal atrial fibrillation (HCC)    First degree atrioventricular block    Pericardial effusion    Precordial pain    Anasarca    Prediabetes    UTI (urinary tract infection)      Past Medical History:   Past Medical History:   Diagnosis Date    Acute bronchitis 2015    Acute on chronic diastolic (congestive) [facility] failure ([facility] 2017    Acute renal failure ([facility]     Acute renal failure with acute cortical necrosis ([facility] [address] [facility] 2013    is going home , SS called to explain   Visual perceptual skills: intact     Glasses: yes  Edema: yes L knee  Sensation: impaired numb fingers   Hand Dominance:  Left X Right     Left Right Comment   Passive range of motion [facility]     Active range of motion [facility]     Muscle Grade 4/5 4/5    /pinch Strength Good  Good         Function Assessment    Status  Goal  Comment    Feeding:  Minimal Assist  Independent     Grooming: Moderate Assist  Independent    UE dressing:  Maximal Assist  Supervision    LE dressing:  Maximal Assist  Maximal Assist    Bathing:   Moderate Assist  Minimal Assist    Bed Mobility: Maximal Assist for supine to sit,   Maximal Assist for scooting,  Maximal Assist X2  for sit to supine    Supervision    Functional Transfers:    Maximal Assist for sit  Supervision Safety: good        Functional Mobility: Unable to stand to pivot  Minimal Assist    Balance:   Sitting: good    Standing: fair    Endurance: fair   Patients Goal: home \" I am going home \" After talking with  is agreeable to [facility] for rehab   Treatment:bathed max assist , washed face and hands max assist to EOB , sitting with fatigue , word finding problems , cues to stay upright , attempted to scoot to EOB , attempted to stand , unable to give any support  No rise power , sit to supine max assist X2 , bed alarm ON   The following skilled interventions were introduced during initial assessment:  bathing and dressing retraining, therapeutic exercise, safety/fall prevention technique, use of safety equipment for fall prevention and functional mobility training for safety/stability  Assessment Summary: Performance Deficiencies include:  Decreased functional mobility:  LE weakness  Decreased endurance:  fatigues easily  Decreased ADL status:  requires assistance dressing  Decreased safety awareness:  requires verbal cues for safe performance  Decreased sensation:  fingers   Decreased cognition: yes confused   Rehab Potential:

## 2018-08-30 NOTE — H&P
LAB PROCEDURE  10/11/2007    LVF well preserved, EF 70%. No MR. Patent coronary arteries. Patent stented segments in LAD.  DIAGNOSTIC CARDIAC CATH LAB PROCEDURE  09/27/2006    Patent stents in proximal and mid LAD with mild instent stenosis. Mild CAD of mid LAD 30% stenosis. Normal LVSF, EF 60%.  DIAGNOSTIC CARDIAC CATH LAB PROCEDURE  05/23/2003    EF and contraction pattern normal. No MR. Normal LVF. Atherosclerotic CAD.  ECHO COMPLETE  1/20/2014 EF62%    stage I diastolic    GALLBLADDER SURGERY      HERNIA REPAIR      HIATAL HERNIA REPAIR      HYSTERECTOMY      JOINT REPLACEMENT      THYROID SURGERY      THYROIDECTOMY         Medications Prior to Admission:    Prior to Admission medications    Medication Sig Start Date End Date Taking? Authorizing Provider   allopurinol (ZYLOPRIM) 100 MG tablet Take 100 mg by mouth daily   Yes Historical Provider, MD   METOPROLOL SUCCINATE ER PO Take 50 mg by mouth daily   Yes Historical Provider, MD   nitroGLYCERIN (NITROSTAT) 0.4 MG SL tablet Place 0.4 mg under the tongue every 5 minutes as needed for Chest pain up to max of 3 total doses. If no relief after 1 dose, call 911. Yes Historical Provider, MD   spironolactone (ALDACTONE) 25 MG tablet Take 25 mg by mouth daily   Yes Historical Provider, MD   HYDROcodone-acetaminophen (NORCO) 5-325 MG per tablet Take 1 tablet by mouth every 4 hours as needed for Pain. .   Yes Historical Provider, MD   Glucose Blood (BLOOD GLUCOSE TEST STRIPS) STRP For use with glucometer 3/14/18   Alison Camarena DO   Lancets MISC For use with glucometer 3/14/18   Alexandre Galeana, DO   Blood Glucose Monitoring Suppl ELLY Check BG daily 3/14/18   Alexandre Galeana DO   furosemide (LASIX) 40 MG tablet TAKE 2 TABLETS BY MOUTH TWICE DAILY 3/7/18   Alexandre Galeana DO   potassium chloride (MICRO-K) 10 MEQ extended release capsule Take 10 mEq by mouth daily    Historical Provider, MD   hydrOXYzine (ATARAX) 25 MG tablet Take 1 tablet diastolic heart failure (HCC)    Paroxysmal atrial fibrillation (HCC)    UTI (urinary tract infection)     Gait disorder     Expressive aphasia      PLAN:  Echocardiogram PT OT social service for placement      Electronically signed by Camila Bal MD on 8/30/2018 at 5:47 PM

## 2018-08-30 NOTE — CARE COORDINATION
Ss note:8/30/2018.1:43 PM sw met with pt & spouse earlier today as sw was notified that pts spouse here and wanted to take pt home today. OT therapist was in room working with pt during this conversation. SW explained consult for SNF from physician along with therapy rec for rehab & that pt & spouse were agreeable to [address] yesterday; pt remains confused. Spouse states there is furniture being delivered to home today and he is not able to travel back and forth to the hospital. At the end of this conversation pt & spouse remained agreeable to rehab. SW has been notified bu liaison Novella Lesches with [facility] facility unable to accept pt. SW left a vm message for pt spouse requesting return call as kumar has requested liaison Sivan with [facility] to review the pt for rehab if spouse is agreeable to this facility. KUMAR will follow.  CHERY Sims

## 2018-08-30 NOTE — PROGRESS NOTES
[id number]    PHYSICAL THERAPY INITIAL EVALUATION  Tentative placement recommendation: [facility]  Equipment prescriptions needed:  none  Plan of care: Patient will be seen  daily for therapeutic exercise, functional retraining, endurance activities, balance exercises, family and patient education. AM-[facility] Basic Mobility       AM-[facility] Mobility Inpatient   How much difficulty turning over in bed?: A Little  How much difficulty sitting down on / standing up from a chair with arms?: A Lot  How much difficulty moving from lying on back to sitting on side of bed?: A Lot  How much help from another person moving to and from a bed to a chair?: A Lot  How much help from another person needed to walk in hospital room?: A Lot  How much help from another person for climbing 3-5 steps with a railing?: Total  AM-PAC Inpatient Mobility Raw Score : 12  AM-PAC Inpatient T-Scale Score : 35.33  Mobility Inpatient CMS 0-100% Score: 68.66  Mobility Inpatient CMS G-Code Modifier : CL    Order: evaluate and treat  Diagnosis:    1. Urinary tract infection without hematuria, site unspecified    2. Frequent falls    3. Fatigue, unspecified type    4. Lymphedema    5.  Dyspnea, unspecified type      Past medical history:       Diagnosis Date    Acute bronchitis 4/27/2015    Acute on chronic diastolic (congestive) heart failure ([facility] [address] [facility] 7/18/2017    Acute renal failure (HCC)     Acute renal failure with acute cortical necrosis ([address] 12/29/2013    Acute-on-chronic kidney injury ([facility] [address] [facility] 1/19/2014    Atrial fibrillation (HCC)     CAD (coronary artery disease)     stent x 3    Cancer ([facility] [address] [facility]     thyroid and skin    CHF (congestive heart failure) ([facility] [address] [facility]     Echo 1/20/14 EF 47% stage I diastolic    Depression     Enteritis 9/12/2014    H/O echocardiogram 4/10/15    EF 84% stage I diastolic    History of cardiovascular stress test 1/20/14    Lexiscan    History of cardiovascular stress test 11/20/2017    Lexiscan stress test    History of

## 2018-08-31 ENCOUNTER — APPOINTMENT (OUTPATIENT)
Dept: GENERAL RADIOLOGY | Age: 73
DRG: 292 | End: 2018-08-31
Payer: MEDICARE

## 2018-08-31 LAB
ANION GAP SERPL CALCULATED.3IONS-SCNC: 15 MMOL/L (ref 7–16)
BASOPHILS ABSOLUTE: 0.03 E9/L (ref 0–0.2)
BASOPHILS RELATIVE PERCENT: 0.3 % (ref 0–2)
BUN BLDV-MCNC: 16 MG/DL (ref 8–23)
CALCIUM SERPL-MCNC: 9.5 MG/DL (ref 8.6–10.2)
CHLORIDE BLD-SCNC: 101 MMOL/L (ref 98–107)
CO2: 25 MMOL/L (ref 22–29)
CREAT SERPL-MCNC: 1.3 MG/DL (ref 0.5–1)
EOSINOPHILS ABSOLUTE: 0.23 E9/L (ref 0.05–0.5)
EOSINOPHILS RELATIVE PERCENT: 2.6 % (ref 0–6)
GFR AFRICAN AMERICAN: 48
GFR NON-AFRICAN AMERICAN: 40 ML/MIN/1.73
GLUCOSE BLD-MCNC: 126 MG/DL (ref 74–109)
HCT VFR BLD CALC: 43.6 % (ref 34–48)
HEMOGLOBIN: 14.4 G/DL (ref 11.5–15.5)
IMMATURE GRANULOCYTES #: 0.06 E9/L
IMMATURE GRANULOCYTES %: 0.7 % (ref 0–5)
LV EF: 54 %
LVEF MODALITY: NORMAL
LYMPHOCYTES ABSOLUTE: 1.34 E9/L (ref 1.5–4)
LYMPHOCYTES RELATIVE PERCENT: 14.9 % (ref 20–42)
MCH RBC QN AUTO: 30.1 PG (ref 26–35)
MCHC RBC AUTO-ENTMCNC: 33 % (ref 32–34.5)
MCV RBC AUTO: 91 FL (ref 80–99.9)
MONOCYTES ABSOLUTE: 0.89 E9/L (ref 0.1–0.95)
MONOCYTES RELATIVE PERCENT: 9.9 % (ref 2–12)
NEUTROPHILS ABSOLUTE: 6.46 E9/L (ref 1.8–7.3)
NEUTROPHILS RELATIVE PERCENT: 71.6 % (ref 43–80)
PDW BLD-RTO: 13.2 FL (ref 11.5–15)
PLATELET # BLD: 277 E9/L (ref 130–450)
PMV BLD AUTO: 9.4 FL (ref 7–12)
POTASSIUM SERPL-SCNC: 4.2 MMOL/L (ref 3.5–5)
RBC # BLD: 4.79 E12/L (ref 3.5–5.5)
SODIUM BLD-SCNC: 141 MMOL/L (ref 132–146)
WBC # BLD: 9 E9/L (ref 4.5–11.5)

## 2018-08-31 PROCEDURE — 6360000002 HC RX W HCPCS: Performed by: INTERNAL MEDICINE

## 2018-08-31 PROCEDURE — 73560 X-RAY EXAM OF KNEE 1 OR 2: CPT

## 2018-08-31 PROCEDURE — 6370000000 HC RX 637 (ALT 250 FOR IP): Performed by: INTERNAL MEDICINE

## 2018-08-31 PROCEDURE — 1200000000 HC SEMI PRIVATE

## 2018-08-31 PROCEDURE — 80048 BASIC METABOLIC PNL TOTAL CA: CPT

## 2018-08-31 PROCEDURE — 85025 COMPLETE CBC W/AUTO DIFF WBC: CPT

## 2018-08-31 PROCEDURE — 93306 TTE W/DOPPLER COMPLETE: CPT

## 2018-08-31 PROCEDURE — 2580000003 HC RX 258: Performed by: INTERNAL MEDICINE

## 2018-08-31 PROCEDURE — G0378 HOSPITAL OBSERVATION PER HR: HCPCS

## 2018-08-31 PROCEDURE — 36415 COLL VENOUS BLD VENIPUNCTURE: CPT

## 2018-08-31 RX ORDER — HYDRALAZINE HYDROCHLORIDE 50 MG/1
50 TABLET, FILM COATED ORAL EVERY 6 HOURS SCHEDULED
Status: DISCONTINUED | OUTPATIENT
Start: 2018-08-31 | End: 2018-09-01 | Stop reason: HOSPADM

## 2018-08-31 RX ADMIN — GABAPENTIN 300 MG: 300 CAPSULE ORAL at 19:59

## 2018-08-31 RX ADMIN — ROPINIROLE HYDROCHLORIDE 1 MG: 1 TABLET, FILM COATED ORAL at 19:59

## 2018-08-31 RX ADMIN — POTASSIUM CHLORIDE 10 MEQ: 750 TABLET, EXTENDED RELEASE ORAL at 09:47

## 2018-08-31 RX ADMIN — HYDRALAZINE HYDROCHLORIDE 50 MG: 50 TABLET ORAL at 19:59

## 2018-08-31 RX ADMIN — METOPROLOL SUCCINATE 50 MG: 50 TABLET, EXTENDED RELEASE ORAL at 09:48

## 2018-08-31 RX ADMIN — ALLOPURINOL 100 MG: 100 TABLET ORAL at 09:48

## 2018-08-31 RX ADMIN — SIMVASTATIN 40 MG: 40 TABLET, FILM COATED ORAL at 19:59

## 2018-08-31 RX ADMIN — FUROSEMIDE 80 MG: 40 TABLET ORAL at 09:47

## 2018-08-31 RX ADMIN — AMLODIPINE BESYLATE 5 MG: 5 TABLET ORAL at 09:48

## 2018-08-31 RX ADMIN — WATER 1 G: 1 INJECTION INTRAMUSCULAR; INTRAVENOUS; SUBCUTANEOUS at 18:33

## 2018-08-31 RX ADMIN — HYDRALAZINE HYDROCHLORIDE 50 MG: 50 TABLET ORAL at 14:36

## 2018-08-31 RX ADMIN — SPIRONOLACTONE 25 MG: 25 TABLET, FILM COATED ORAL at 09:47

## 2018-08-31 RX ADMIN — CLONIDINE HYDROCHLORIDE 0.3 MG: 0.2 TABLET ORAL at 19:59

## 2018-08-31 RX ADMIN — FUROSEMIDE 80 MG: 40 TABLET ORAL at 18:33

## 2018-08-31 RX ADMIN — ROPINIROLE HYDROCHLORIDE 1 MG: 1 TABLET, FILM COATED ORAL at 14:36

## 2018-08-31 RX ADMIN — LEVOTHYROXINE SODIUM 200 MCG: 100 TABLET ORAL at 05:42

## 2018-08-31 RX ADMIN — ROPINIROLE HYDROCHLORIDE 1 MG: 1 TABLET, FILM COATED ORAL at 09:48

## 2018-08-31 RX ADMIN — HYDROCODONE BITARTRATE AND ACETAMINOPHEN 1 TABLET: 5; 325 TABLET ORAL at 09:47

## 2018-08-31 ASSESSMENT — PAIN DESCRIPTION - PAIN TYPE: TYPE: ACUTE PAIN

## 2018-08-31 ASSESSMENT — PAIN SCALES - GENERAL
PAINLEVEL_OUTOF10: 0
PAINLEVEL_OUTOF10: 0
PAINLEVEL_OUTOF10: 7
PAINLEVEL_OUTOF10: 4

## 2018-08-31 ASSESSMENT — PAIN DESCRIPTION - LOCATION: LOCATION: LEG

## 2018-08-31 ASSESSMENT — PAIN DESCRIPTION - DESCRIPTORS: DESCRIPTORS: ACHING;CONSTANT;DISCOMFORT

## 2018-08-31 ASSESSMENT — PAIN DESCRIPTION - ORIENTATION: ORIENTATION: LEFT

## 2018-08-31 NOTE — CONSULTS
She has never used smokeless tobacco.  ETOH:   reports that she does not drink alcohol. DRUGS:   reports that she does not use drugs.   ACTIVITIES OF DAILY LIVING:    OCCUPATION:    Family History:   Family History   Problem Relation Age of Onset    Heart Disease Father 46        , cardiomyopathy    Stroke Mother     Heart Failure Brother        REVIEW OF SYSTEMS:  CONSTITUTIONAL:  negative for  fevers  EYES:  negative for blurred vision, visual disturbance  HEENT:  negative for  hearing loss, voice change  RESPIRATORY:  negative for  dyspnea  CARDIOVASCULAR:  negative for  chest pain  GASTROINTESTINAL:  negative for nausea, vomiting  MUSCULOSKELETAL:  positive for  pain and joint swelling to right knee  NEUROLOGICAL:  negative for headaches, dizziness  BEHAVIOR/PSYCH:  negative for increased agitation and anxiety    PHYSICAL EXAM:    VITALS:  /69   Pulse 62   Temp 98.5 °F (36.9 °C) (Oral)   Resp 18   Ht 5' 6\" (1.676 m)   Wt 232 lb 5 oz (105.4 kg)   SpO2 94%   BMI 37.50 kg/m²   CONSTITUTIONAL:  Awake and oriented  MUSCULOSKELETAL:  Left lower Extremity:  · 2+ effusion to left knee  · No erythema or drainage  · ROM guarded because of pain  · Unable to perform ligamentous tests because of pain  · Edema to bilateral ankles  · SILT dp/sp/t/s/s nerve distributions  · Foot warm and perfused      DATA:    CBC:   Lab Results   Component Value Date    WBC 9.0 2018    RBC 4.79 2018    HGB 14.4 2018    HCT 43.6 2018    MCV 91.0 2018    MCH 30.1 2018    MCHC 33.0 2018    RDW 13.2 2018     2018    MPV 9.4 2018     PT/INR:    Lab Results   Component Value Date    PROTIME 9.9 2018    INR 0.9 2018       Radiology Review:  XR Left knee - ordered    IMPRESSION:  · Left knee effusion    PLAN:  · NWB LLE pending x-rays  · Xrays of left knee have been ordered - will review once performed  · Discussed possible need for aspiration and injection pending x-rays.  Patient is agreeable if needed  · Elevate extremity  · May ice extremity as needed  · Pain control per medicine  · Discussed with Dr. Manju Rdz

## 2018-08-31 NOTE — CARE COORDINATION
Ss note:8/31/2018.10:08 AM Awaiting return call from pt spouse Radha Kong [phone] as pt has been accepted at [facility] for skilled rehab HOWEVER awaiting return call from spouse/family to determine if agreeable to this SNF. SW left message for spouse Patricia Midget yesterday and today as [facility] unable to accept pt. VM message left for pt dtr Elsa Griffin at [phone] requesting return call. Per oswaldo Finnegan at [facility] can admit to facility today or over the holiday weekend if spouse is in agreement. HENS completed.  Dany Simpson, CHERY

## 2018-08-31 NOTE — PLAN OF CARE
Problem: Falls - Risk of:  Goal: Will remain free from falls  Will remain free from falls   Outcome: Met This Shift      Problem: Pain:  Goal: Pain level will decrease  Pain level will decrease   Outcome: Met This Shift      Problem: Risk for Impaired Skin Integrity  Goal: Tissue integrity - skin and mucous membranes  Structural intactness and normal physiological function of skin and  mucous membranes.    Outcome: Met This Shift

## 2018-08-31 NOTE — CARE COORDINATION
Ss note:8/31/2018.2:20 PM received call from pt dtr [address] at [phone] who is agreeable to [facility] Pt has been accepted, bed is available today and over the holiday weekend with liaison Sivan relaying precert is just a phone call and will not hold up a discharge. N-17 has been signed, HENS completed. When discharge order is written, please notify pt dtr at [phone].  CHERY Uriostegui

## 2018-09-01 VITALS
TEMPERATURE: 99 F | BODY MASS INDEX: 37.33 KG/M2 | HEART RATE: 71 BPM | WEIGHT: 232.31 LBS | DIASTOLIC BLOOD PRESSURE: 70 MMHG | SYSTOLIC BLOOD PRESSURE: 133 MMHG | HEIGHT: 66 IN | RESPIRATION RATE: 18 BRPM | OXYGEN SATURATION: 93 %

## 2018-09-01 LAB — URINE CULTURE, ROUTINE: NORMAL

## 2018-09-01 PROCEDURE — G0378 HOSPITAL OBSERVATION PER HR: HCPCS

## 2018-09-01 PROCEDURE — 6370000000 HC RX 637 (ALT 250 FOR IP): Performed by: INTERNAL MEDICINE

## 2018-09-01 RX ORDER — CIPROFLOXACIN 500 MG/1
500 TABLET, FILM COATED ORAL EVERY 12 HOURS SCHEDULED
Status: DISCONTINUED | OUTPATIENT
Start: 2018-09-01 | End: 2018-09-01 | Stop reason: HOSPADM

## 2018-09-01 RX ORDER — AMLODIPINE BESYLATE 5 MG/1
5 TABLET ORAL DAILY
Qty: 30 TABLET | Refills: 3 | Status: ON HOLD | OUTPATIENT
Start: 2018-09-02 | End: 2020-05-05

## 2018-09-01 RX ORDER — CIPROFLOXACIN 500 MG/1
500 TABLET, FILM COATED ORAL EVERY 12 HOURS SCHEDULED
Qty: 20 TABLET | Refills: 0 | Status: SHIPPED | OUTPATIENT
Start: 2018-09-01 | End: 2018-09-11

## 2018-09-01 RX ORDER — HYDRALAZINE HYDROCHLORIDE 50 MG/1
50 TABLET, FILM COATED ORAL 4 TIMES DAILY
Qty: 90 TABLET | Refills: 3 | Status: SHIPPED | OUTPATIENT
Start: 2018-09-01 | End: 2019-02-22

## 2018-09-01 RX ADMIN — RIVAROXABAN 15 MG: 15 TABLET, FILM COATED ORAL at 17:20

## 2018-09-01 RX ADMIN — SPIRONOLACTONE 25 MG: 25 TABLET, FILM COATED ORAL at 09:01

## 2018-09-01 RX ADMIN — LEVOTHYROXINE SODIUM 200 MCG: 100 TABLET ORAL at 06:22

## 2018-09-01 RX ADMIN — AMLODIPINE BESYLATE 5 MG: 5 TABLET ORAL at 09:02

## 2018-09-01 RX ADMIN — POTASSIUM CHLORIDE 10 MEQ: 750 TABLET, EXTENDED RELEASE ORAL at 09:02

## 2018-09-01 RX ADMIN — ROPINIROLE HYDROCHLORIDE 1 MG: 1 TABLET, FILM COATED ORAL at 09:01

## 2018-09-01 RX ADMIN — ROPINIROLE HYDROCHLORIDE 1 MG: 1 TABLET, FILM COATED ORAL at 13:48

## 2018-09-01 RX ADMIN — HYDRALAZINE HYDROCHLORIDE 50 MG: 50 TABLET ORAL at 17:20

## 2018-09-01 RX ADMIN — HYDRALAZINE HYDROCHLORIDE 50 MG: 50 TABLET ORAL at 00:00

## 2018-09-01 RX ADMIN — FUROSEMIDE 80 MG: 40 TABLET ORAL at 09:01

## 2018-09-01 RX ADMIN — ALLOPURINOL 100 MG: 100 TABLET ORAL at 09:02

## 2018-09-01 RX ADMIN — FUROSEMIDE 80 MG: 40 TABLET ORAL at 17:20

## 2018-09-01 RX ADMIN — HYDRALAZINE HYDROCHLORIDE 50 MG: 50 TABLET ORAL at 12:15

## 2018-09-01 RX ADMIN — HYDRALAZINE HYDROCHLORIDE 50 MG: 50 TABLET ORAL at 06:21

## 2018-09-01 RX ADMIN — METOPROLOL SUCCINATE 50 MG: 50 TABLET, EXTENDED RELEASE ORAL at 09:01

## 2018-09-01 NOTE — DISCHARGE SUMMARY
clear to auscultation  Heart regular rate and rhythm no S3 gallop without murmurs or rubs  Abdomen soft nontender bowel sounds was 14 quadrants organomegaly no masses  Extremities +1 edema bilaterally left knee effusion has decreased and patient is able to move her knee without pain with full range of motion  Neuro cranial nerves are grossly intact and no motor or sensory deficits  Skin intact    Disposition: SNF      Discharge Medications:   Toy Sprung   Home Medication Instructions [id number]    Printed on:09/01/18 9119   Medication Information                      allopurinol (ZYLOPRIM) 100 MG tablet  Take 100 mg by mouth daily             Blood Glucose Monitoring Suppl ELLY  Check BG daily             furosemide (LASIX) 40 MG tablet  TAKE 2 TABLETS BY MOUTH TWICE DAILY             gabapentin (NEURONTIN) 300 MG capsule  TAKE ONE CAPSULE BY MOUTH AT BEDTIME             Glucose Blood (BLOOD GLUCOSE TEST STRIPS) STRP  For use with glucometer             HYDROcodone-acetaminophen (NORCO) 5-325 MG per tablet  Take 1 tablet by mouth every 4 hours as needed for Pain. .             hydrOXYzine (ATARAX) 25 MG tablet  Take 1 tablet by mouth every 6 hours as needed for Itching             Lancets MISC  For use with glucometer             levothyroxine (SYNTHROID) 200 MCG tablet  Take 1 tablet by mouth every morning (before breakfast)             METOPROLOL SUCCINATE ER PO  Take 50 mg by mouth daily             nitroGLYCERIN (NITROSTAT) 0.4 MG SL tablet  Place 0.4 mg under the tongue every 5 minutes as needed for Chest pain up to max of 3 total doses. If no relief after 1 dose, call 911.              potassium chloride (MICRO-K) 10 MEQ extended release capsule  Take 10 mEq by mouth daily             rivaroxaban (XARELTO) 20 MG TABS tablet  Take 1 tablet by mouth daily (with breakfast)             rOPINIRole (REQUIP) 2 MG tablet  Take 1 mg by mouth 3 times daily              simvastatin (ZOCOR) 40 MG tablet  TAKE

## 2018-09-01 NOTE — PROGRESS NOTES
Department of Orthopedic Surgery  Resident Progress Note    Patient seen and examined. Pain controlled. No new complaints. Denies chest pain, shortness of breath, calf pain, dizziness/lightheadedness. States that her knee has not improved much since prior examination. VITALS:  BP (!) 143/79   Pulse 65   Temp 98.5 °F (36.9 °C) (Oral)   Resp 16   Ht 5' 6\" (1.676 m)   Wt 232 lb 5 oz (105.4 kg)   SpO2 93%   BMI 37.50 kg/m²     GENERAL: alert and awake  MUSCULOSKELETAL:   left lower extremity:    · +effusion  ·  ROM limited by pain  · Hyperalgesic   · Compartments soft and compressible, calf non-tender  · Palpable dorsalis pedis and posterior tibialis pulse, brisk cap refill to toes, foot warm and perfused  · Sensation intact to light touch in sural/deep peroneal/superficial peroneal/saphenous/posterior tibial nerve distributions to foot/ankle. · Demonstrates active ankle plantar/dorsiflexion/great toe extension    CBC:   Lab Results   Component Value Date    WBC 9.0 08/31/2018    HGB 14.4 08/31/2018    HCT 43.6 08/31/2018     08/31/2018     XR left knee: no acute fractures or dislocations. Severe tricomparmental OA, with chondrocalcinosis     ASSESSMENT  · Left knee hemarthrosis    PLAN    · WBAT LLE  · Pain control per medicine  · Monitor vitals  · Recommend PT/OT  · Recommend compression wrap to knee  · ICE to affected knee  · Elevate as able   · After discussion with the patient she would like to try conservative measures to resolve the hemarthrosis before aspiration is attempted. · Discuss with attending.

## 2018-09-28 PROBLEM — N39.0 UTI (URINARY TRACT INFECTION): Status: RESOLVED | Noted: 2018-08-29 | Resolved: 2018-09-28

## 2018-12-17 ENCOUNTER — TELEPHONE (OUTPATIENT)
Dept: CARDIOLOGY CLINIC | Age: 73
End: 2018-12-17

## 2019-02-05 ENCOUNTER — TELEPHONE (OUTPATIENT)
Dept: FAMILY MEDICINE CLINIC | Age: 74
End: 2019-02-05

## 2019-02-05 ENCOUNTER — OFFICE VISIT (OUTPATIENT)
Dept: FAMILY MEDICINE CLINIC | Age: 74
End: 2019-02-05
Payer: MEDICARE

## 2019-02-05 VITALS
HEART RATE: 60 BPM | DIASTOLIC BLOOD PRESSURE: 78 MMHG | SYSTOLIC BLOOD PRESSURE: 136 MMHG | BODY MASS INDEX: 39.71 KG/M2 | OXYGEN SATURATION: 96 % | HEIGHT: 67 IN | WEIGHT: 253 LBS

## 2019-02-05 DIAGNOSIS — Z74.09 IMPAIRED MOBILITY AND ADLS: ICD-10-CM

## 2019-02-05 DIAGNOSIS — E03.9 ACQUIRED HYPOTHYROIDISM: Chronic | ICD-10-CM

## 2019-02-05 DIAGNOSIS — E78.5 DYSLIPIDEMIA: Chronic | ICD-10-CM

## 2019-02-05 DIAGNOSIS — Z23 NEED FOR STREPTOCOCCUS PNEUMONIAE VACCINATION: ICD-10-CM

## 2019-02-05 DIAGNOSIS — Z12.39 BREAST CANCER SCREENING: ICD-10-CM

## 2019-02-05 DIAGNOSIS — Z12.11 COLON CANCER SCREENING: ICD-10-CM

## 2019-02-05 DIAGNOSIS — Z78.9 IMPAIRED MOBILITY AND ADLS: ICD-10-CM

## 2019-02-05 DIAGNOSIS — I10 ESSENTIAL HYPERTENSION: Primary | Chronic | ICD-10-CM

## 2019-02-05 DIAGNOSIS — R73.03 PREDIABETES: Chronic | ICD-10-CM

## 2019-02-05 PROBLEM — R07.2 PRECORDIAL PAIN: Status: RESOLVED | Noted: 2017-11-19 | Resolved: 2019-02-05

## 2019-02-05 PROBLEM — I31.39 PERICARDIAL EFFUSION: Status: RESOLVED | Noted: 2017-11-03 | Resolved: 2019-02-05

## 2019-02-05 PROCEDURE — 99214 OFFICE O/P EST MOD 30 MIN: CPT | Performed by: FAMILY MEDICINE

## 2019-02-05 PROCEDURE — 1090F PRES/ABSN URINE INCON ASSESS: CPT | Performed by: FAMILY MEDICINE

## 2019-02-05 PROCEDURE — 3017F COLORECTAL CA SCREEN DOC REV: CPT | Performed by: FAMILY MEDICINE

## 2019-02-05 PROCEDURE — G8427 DOCREV CUR MEDS BY ELIG CLIN: HCPCS | Performed by: FAMILY MEDICINE

## 2019-02-05 PROCEDURE — G8417 CALC BMI ABV UP PARAM F/U: HCPCS | Performed by: FAMILY MEDICINE

## 2019-02-05 PROCEDURE — 1036F TOBACCO NON-USER: CPT | Performed by: FAMILY MEDICINE

## 2019-02-05 PROCEDURE — 90670 PCV13 VACCINE IM: CPT | Performed by: FAMILY MEDICINE

## 2019-02-05 PROCEDURE — 4040F PNEUMOC VAC/ADMIN/RCVD: CPT | Performed by: FAMILY MEDICINE

## 2019-02-05 PROCEDURE — 1123F ACP DISCUSS/DSCN MKR DOCD: CPT | Performed by: FAMILY MEDICINE

## 2019-02-05 PROCEDURE — 1101F PT FALLS ASSESS-DOCD LE1/YR: CPT | Performed by: FAMILY MEDICINE

## 2019-02-05 PROCEDURE — G8484 FLU IMMUNIZE NO ADMIN: HCPCS | Performed by: FAMILY MEDICINE

## 2019-02-05 PROCEDURE — G8599 NO ASA/ANTIPLAT THER USE RNG: HCPCS | Performed by: FAMILY MEDICINE

## 2019-02-05 PROCEDURE — G0009 ADMIN PNEUMOCOCCAL VACCINE: HCPCS | Performed by: FAMILY MEDICINE

## 2019-02-05 PROCEDURE — G8400 PT W/DXA NO RESULTS DOC: HCPCS | Performed by: FAMILY MEDICINE

## 2019-02-05 ASSESSMENT — ENCOUNTER SYMPTOMS
BACK PAIN: 1
ABDOMINAL PAIN: 0
SHORTNESS OF BREATH: 1
COUGH: 1
BLOOD IN STOOL: 0

## 2019-02-06 ENCOUNTER — TELEPHONE (OUTPATIENT)
Dept: FAMILY MEDICINE CLINIC | Age: 74
End: 2019-02-06

## 2019-02-06 RX ORDER — METOPROLOL SUCCINATE 50 MG/1
50 TABLET, EXTENDED RELEASE ORAL DAILY
Qty: 30 TABLET | Refills: 1 | Status: SHIPPED | OUTPATIENT
Start: 2019-02-06 | End: 2019-03-18 | Stop reason: SDUPTHER

## 2019-02-06 RX ORDER — ROPINIROLE 2 MG/1
1 TABLET, FILM COATED ORAL 3 TIMES DAILY
Qty: 90 TABLET | Refills: 0 | Status: SHIPPED | OUTPATIENT
Start: 2019-02-06 | End: 2019-02-22

## 2019-02-06 RX ORDER — LEVOTHYROXINE SODIUM 0.2 MG/1
200 TABLET ORAL
Qty: 30 TABLET | Refills: 1 | Status: SHIPPED | OUTPATIENT
Start: 2019-02-06 | End: 2019-03-18 | Stop reason: SDUPTHER

## 2019-02-11 ENCOUNTER — TELEPHONE (OUTPATIENT)
Dept: FAMILY MEDICINE CLINIC | Age: 74
End: 2019-02-11

## 2019-02-12 ENCOUNTER — TELEPHONE (OUTPATIENT)
Dept: FAMILY MEDICINE CLINIC | Age: 74
End: 2019-02-12

## 2019-02-15 ENCOUNTER — HOSPITAL ENCOUNTER (OUTPATIENT)
Age: 74
Discharge: HOME | End: 2019-02-17
Payer: MEDICAID

## 2019-02-15 DIAGNOSIS — E78.5 DYSLIPIDEMIA: Chronic | ICD-10-CM

## 2019-02-15 DIAGNOSIS — R73.03 PREDIABETES: Chronic | ICD-10-CM

## 2019-02-15 DIAGNOSIS — I10 ESSENTIAL HYPERTENSION: Chronic | ICD-10-CM

## 2019-02-15 DIAGNOSIS — E03.9 ACQUIRED HYPOTHYROIDISM: Chronic | ICD-10-CM

## 2019-02-15 LAB
ALBUMIN SERPL-MCNC: 4 G/DL (ref 3.5–5.2)
ALP BLD-CCNC: 99 U/L (ref 35–104)
ALT SERPL-CCNC: 11 U/L (ref 0–32)
ANION GAP SERPL CALCULATED.3IONS-SCNC: 10 MMOL/L (ref 7–16)
AST SERPL-CCNC: 13 U/L (ref 0–31)
BASOPHILS ABSOLUTE: 0.04 E9/L (ref 0–0.2)
BASOPHILS RELATIVE PERCENT: 0.7 % (ref 0–2)
BILIRUB SERPL-MCNC: 0.3 MG/DL (ref 0–1.2)
BUN BLDV-MCNC: 25 MG/DL (ref 8–23)
CALCIUM SERPL-MCNC: 9.4 MG/DL (ref 8.6–10.2)
CHLORIDE BLD-SCNC: 104 MMOL/L (ref 98–107)
CHOLESTEROL, TOTAL: 225 MG/DL (ref 0–199)
CO2: 28 MMOL/L (ref 22–29)
CREAT SERPL-MCNC: 1.3 MG/DL (ref 0.5–1)
EOSINOPHILS ABSOLUTE: 0.35 E9/L (ref 0.05–0.5)
EOSINOPHILS RELATIVE PERCENT: 5.7 % (ref 0–6)
GFR AFRICAN AMERICAN: 48
GFR NON-AFRICAN AMERICAN: 40 ML/MIN/1.73
GLUCOSE BLD-MCNC: 86 MG/DL (ref 74–99)
HBA1C MFR BLD: 5.7 % (ref 4–5.6)
HCT VFR BLD CALC: 43.5 % (ref 34–48)
HDLC SERPL-MCNC: 44 MG/DL
HEMOGLOBIN: 13.6 G/DL (ref 11.5–15.5)
IMMATURE GRANULOCYTES #: 0.02 E9/L
IMMATURE GRANULOCYTES %: 0.3 % (ref 0–5)
LDL CHOLESTEROL CALCULATED: 131 MG/DL (ref 0–99)
LYMPHOCYTES ABSOLUTE: 1.3 E9/L (ref 1.5–4)
LYMPHOCYTES RELATIVE PERCENT: 21.2 % (ref 20–42)
MCH RBC QN AUTO: 30.4 PG (ref 26–35)
MCHC RBC AUTO-ENTMCNC: 31.3 % (ref 32–34.5)
MCV RBC AUTO: 97.1 FL (ref 80–99.9)
MONOCYTES ABSOLUTE: 0.43 E9/L (ref 0.1–0.95)
MONOCYTES RELATIVE PERCENT: 7 % (ref 2–12)
NEUTROPHILS ABSOLUTE: 3.99 E9/L (ref 1.8–7.3)
NEUTROPHILS RELATIVE PERCENT: 65.1 % (ref 43–80)
PDW BLD-RTO: 13 FL (ref 11.5–15)
PLATELET # BLD: 313 E9/L (ref 130–450)
PMV BLD AUTO: 9 FL (ref 7–12)
POTASSIUM SERPL-SCNC: 4.6 MMOL/L (ref 3.5–5)
RBC # BLD: 4.48 E12/L (ref 3.5–5.5)
SODIUM BLD-SCNC: 142 MMOL/L (ref 132–146)
T4 FREE: 1.29 NG/DL (ref 0.93–1.7)
TOTAL PROTEIN: 7 G/DL (ref 6.4–8.3)
TRIGL SERPL-MCNC: 252 MG/DL (ref 0–149)
TSH SERPL DL<=0.05 MIU/L-ACNC: 4.12 UIU/ML (ref 0.27–4.2)
VLDLC SERPL CALC-MCNC: 50 MG/DL
WBC # BLD: 6.1 E9/L (ref 4.5–11.5)

## 2019-02-15 PROCEDURE — 84439 ASSAY OF FREE THYROXINE: CPT

## 2019-02-15 PROCEDURE — 80061 LIPID PANEL: CPT

## 2019-02-15 PROCEDURE — 83036 HEMOGLOBIN GLYCOSYLATED A1C: CPT

## 2019-02-15 PROCEDURE — 84443 ASSAY THYROID STIM HORMONE: CPT

## 2019-02-15 PROCEDURE — 85025 COMPLETE CBC W/AUTO DIFF WBC: CPT

## 2019-02-15 PROCEDURE — 36415 COLL VENOUS BLD VENIPUNCTURE: CPT

## 2019-02-15 PROCEDURE — 80053 COMPREHEN METABOLIC PANEL: CPT

## 2019-02-19 ENCOUNTER — OFFICE VISIT (OUTPATIENT)
Dept: FAMILY MEDICINE CLINIC | Age: 74
End: 2019-02-19
Payer: MEDICAID

## 2019-02-19 VITALS
HEIGHT: 67 IN | HEART RATE: 62 BPM | OXYGEN SATURATION: 96 % | WEIGHT: 251 LBS | BODY MASS INDEX: 39.39 KG/M2 | SYSTOLIC BLOOD PRESSURE: 134 MMHG | DIASTOLIC BLOOD PRESSURE: 82 MMHG

## 2019-02-19 DIAGNOSIS — R60.9 PERIPHERAL EDEMA: ICD-10-CM

## 2019-02-19 DIAGNOSIS — R73.03 PREDIABETES: Chronic | ICD-10-CM

## 2019-02-19 DIAGNOSIS — E78.5 DYSLIPIDEMIA: Primary | Chronic | ICD-10-CM

## 2019-02-19 DIAGNOSIS — I10 ESSENTIAL HYPERTENSION: Chronic | ICD-10-CM

## 2019-02-19 DIAGNOSIS — E03.9 ACQUIRED HYPOTHYROIDISM: Chronic | ICD-10-CM

## 2019-02-19 PROCEDURE — 3017F COLORECTAL CA SCREEN DOC REV: CPT | Performed by: FAMILY MEDICINE

## 2019-02-19 PROCEDURE — 99214 OFFICE O/P EST MOD 30 MIN: CPT | Performed by: FAMILY MEDICINE

## 2019-02-19 PROCEDURE — G8417 CALC BMI ABV UP PARAM F/U: HCPCS | Performed by: FAMILY MEDICINE

## 2019-02-19 PROCEDURE — G8484 FLU IMMUNIZE NO ADMIN: HCPCS | Performed by: FAMILY MEDICINE

## 2019-02-19 PROCEDURE — 4040F PNEUMOC VAC/ADMIN/RCVD: CPT | Performed by: FAMILY MEDICINE

## 2019-02-19 PROCEDURE — G8427 DOCREV CUR MEDS BY ELIG CLIN: HCPCS | Performed by: FAMILY MEDICINE

## 2019-02-19 PROCEDURE — G8599 NO ASA/ANTIPLAT THER USE RNG: HCPCS | Performed by: FAMILY MEDICINE

## 2019-02-19 PROCEDURE — 1036F TOBACCO NON-USER: CPT | Performed by: FAMILY MEDICINE

## 2019-02-19 PROCEDURE — 1090F PRES/ABSN URINE INCON ASSESS: CPT | Performed by: FAMILY MEDICINE

## 2019-02-19 PROCEDURE — 1123F ACP DISCUSS/DSCN MKR DOCD: CPT | Performed by: FAMILY MEDICINE

## 2019-02-19 PROCEDURE — 1101F PT FALLS ASSESS-DOCD LE1/YR: CPT | Performed by: FAMILY MEDICINE

## 2019-02-19 PROCEDURE — G8400 PT W/DXA NO RESULTS DOC: HCPCS | Performed by: FAMILY MEDICINE

## 2019-02-19 ASSESSMENT — ENCOUNTER SYMPTOMS
COUGH: 1
BLOOD IN STOOL: 0
ABDOMINAL PAIN: 0
SHORTNESS OF BREATH: 1
BACK PAIN: 1

## 2019-02-20 ENCOUNTER — TELEPHONE (OUTPATIENT)
Dept: FAMILY MEDICINE CLINIC | Age: 74
End: 2019-02-20

## 2019-02-21 DIAGNOSIS — Z12.11 COLON CANCER SCREENING: ICD-10-CM

## 2019-02-21 LAB
CONTROL: NORMAL
HEMOCCULT STL QL: POSITIVE

## 2019-02-21 PROCEDURE — 82274 ASSAY TEST FOR BLOOD FECAL: CPT | Performed by: FAMILY MEDICINE

## 2019-02-22 ENCOUNTER — OFFICE VISIT (OUTPATIENT)
Dept: FAMILY MEDICINE CLINIC | Age: 74
End: 2019-02-22
Payer: MEDICAID

## 2019-02-22 VITALS
SYSTOLIC BLOOD PRESSURE: 134 MMHG | DIASTOLIC BLOOD PRESSURE: 82 MMHG | BODY MASS INDEX: 39.39 KG/M2 | WEIGHT: 251 LBS | HEIGHT: 67 IN

## 2019-02-22 DIAGNOSIS — Z76.0 MEDICATION REFILL: ICD-10-CM

## 2019-02-22 DIAGNOSIS — Z79.899 ENCOUNTER FOR MEDICATION REVIEW: Primary | ICD-10-CM

## 2019-02-22 DIAGNOSIS — I10 ESSENTIAL HYPERTENSION: Chronic | ICD-10-CM

## 2019-02-22 DIAGNOSIS — R19.5 POSITIVE FIT (FECAL IMMUNOCHEMICAL TEST): ICD-10-CM

## 2019-02-22 PROCEDURE — G8400 PT W/DXA NO RESULTS DOC: HCPCS | Performed by: FAMILY MEDICINE

## 2019-02-22 PROCEDURE — 1090F PRES/ABSN URINE INCON ASSESS: CPT | Performed by: FAMILY MEDICINE

## 2019-02-22 PROCEDURE — 1123F ACP DISCUSS/DSCN MKR DOCD: CPT | Performed by: FAMILY MEDICINE

## 2019-02-22 PROCEDURE — G8484 FLU IMMUNIZE NO ADMIN: HCPCS | Performed by: FAMILY MEDICINE

## 2019-02-22 PROCEDURE — 3017F COLORECTAL CA SCREEN DOC REV: CPT | Performed by: FAMILY MEDICINE

## 2019-02-22 PROCEDURE — 99213 OFFICE O/P EST LOW 20 MIN: CPT | Performed by: FAMILY MEDICINE

## 2019-02-22 PROCEDURE — 4040F PNEUMOC VAC/ADMIN/RCVD: CPT | Performed by: FAMILY MEDICINE

## 2019-02-22 PROCEDURE — 1101F PT FALLS ASSESS-DOCD LE1/YR: CPT | Performed by: FAMILY MEDICINE

## 2019-02-22 PROCEDURE — G8417 CALC BMI ABV UP PARAM F/U: HCPCS | Performed by: FAMILY MEDICINE

## 2019-02-22 PROCEDURE — G8598 ASA/ANTIPLAT THER USED: HCPCS | Performed by: FAMILY MEDICINE

## 2019-02-22 PROCEDURE — 1036F TOBACCO NON-USER: CPT | Performed by: FAMILY MEDICINE

## 2019-02-22 PROCEDURE — G8427 DOCREV CUR MEDS BY ELIG CLIN: HCPCS | Performed by: FAMILY MEDICINE

## 2019-02-22 RX ORDER — ROPINIROLE 2 MG/1
1 TABLET, FILM COATED ORAL 3 TIMES DAILY
COMMUNITY
End: 2019-02-22 | Stop reason: SDUPTHER

## 2019-02-22 RX ORDER — ROPINIROLE 2 MG/1
1 TABLET, FILM COATED ORAL 3 TIMES DAILY
Qty: 135 TABLET | Refills: 0 | Status: SHIPPED
Start: 2019-02-22 | End: 2021-02-17 | Stop reason: DRUGHIGH

## 2019-02-22 RX ORDER — GABAPENTIN 300 MG/1
300 CAPSULE ORAL NIGHTLY
Qty: 90 CAPSULE | Refills: 0 | Status: SHIPPED | OUTPATIENT
Start: 2019-02-22 | End: 2019-05-23 | Stop reason: SDUPTHER

## 2019-02-22 RX ORDER — FLUOXETINE 10 MG/1
20 CAPSULE ORAL DAILY
COMMUNITY

## 2019-02-22 ASSESSMENT — ENCOUNTER SYMPTOMS
BLOOD IN STOOL: 0
SHORTNESS OF BREATH: 1
ABDOMINAL PAIN: 0
COUGH: 1
BACK PAIN: 1

## 2019-03-26 RX ORDER — METOPROLOL SUCCINATE 50 MG/1
50 TABLET, EXTENDED RELEASE ORAL DAILY
Qty: 30 TABLET | Refills: 1 | Status: SHIPPED
Start: 2019-03-26 | End: 2021-02-17 | Stop reason: DRUGHIGH

## 2019-03-26 RX ORDER — LEVOTHYROXINE SODIUM 0.2 MG/1
TABLET ORAL
Qty: 30 TABLET | Refills: 1 | Status: SHIPPED
Start: 2019-03-26 | End: 2021-02-17 | Stop reason: DRUGHIGH

## 2019-04-13 ENCOUNTER — HOSPITAL ENCOUNTER (EMERGENCY)
Age: 74
Discharge: HOME | End: 2019-04-13
Attending: EMERGENCY MEDICINE
Payer: MEDICAID

## 2019-04-13 ENCOUNTER — APPOINTMENT (OUTPATIENT)
Dept: GENERAL RADIOLOGY | Age: 74
End: 2019-04-13
Payer: MEDICAID

## 2019-04-13 ENCOUNTER — APPOINTMENT (OUTPATIENT)
Dept: CT IMAGING | Age: 74
End: 2019-04-13
Payer: MEDICAID

## 2019-04-13 VITALS
HEIGHT: 66 IN | SYSTOLIC BLOOD PRESSURE: 171 MMHG | BODY MASS INDEX: 41.14 KG/M2 | WEIGHT: 256 LBS | DIASTOLIC BLOOD PRESSURE: 75 MMHG | RESPIRATION RATE: 16 BRPM | HEART RATE: 54 BPM | OXYGEN SATURATION: 98 % | TEMPERATURE: 98.2 F

## 2019-04-13 DIAGNOSIS — N17.9 ACUTE KIDNEY INJURY (HCC): ICD-10-CM
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
## 2019-04-13 DIAGNOSIS — R10.9 ABDOMINAL PAIN, UNSPECIFIED ABDOMINAL LOCATION: ICD-10-CM

## 2019-04-13 DIAGNOSIS — R91.1 LUNG NODULE: ICD-10-CM

## 2019-04-13 DIAGNOSIS — E86.0 DEHYDRATION: Primary | ICD-10-CM

## 2019-04-13 LAB
ALBUMIN SERPL-MCNC: 4 G/DL (ref 3.5–5.2)
ALP BLD-CCNC: 114 U/L (ref 35–104)
ALT SERPL-CCNC: 15 U/L (ref 0–32)
ANION GAP SERPL CALCULATED.3IONS-SCNC: 11 MMOL/L (ref 7–16)
AST SERPL-CCNC: 15 U/L (ref 0–31)
BACTERIA: ABNORMAL /HPF
BASOPHILS ABSOLUTE: 0.04 E9/L (ref 0–0.2)
BASOPHILS RELATIVE PERCENT: 0.5 % (ref 0–2)
BILIRUB SERPL-MCNC: 0.2 MG/DL (ref 0–1.2)
BILIRUBIN URINE: NEGATIVE
BLOOD, URINE: NEGATIVE
BUN BLDV-MCNC: 41 MG/DL (ref 8–23)
CALCIUM SERPL-MCNC: 9.6 MG/DL (ref 8.6–10.2)
CHLORIDE BLD-SCNC: 101 MMOL/L (ref 98–107)
CLARITY: CLEAR
CO2: 27 MMOL/L (ref 22–29)
CO2: 27 MMOL/L (ref 22–29)
COLOR: YELLOW
CREAT SERPL-MCNC: 1.7 MG/DL (ref 0.5–1)
EOSINOPHILS ABSOLUTE: 0.36 E9/L (ref 0.05–0.5)
EOSINOPHILS RELATIVE PERCENT: 4.7 % (ref 0–6)
EPITHELIAL CELLS, UA: ABNORMAL /HPF
GFR AFRICAN AMERICAN: 35
GFR AFRICAN AMERICAN: 35
GFR NON-AFRICAN AMERICAN: 29 ML/MIN/1.73
GFR NON-AFRICAN AMERICAN: 29 ML/MIN/1.73
GLUCOSE BLD-MCNC: 91 MG/DL (ref 74–99)
GLUCOSE BLD-MCNC: 98 MG/DL (ref 74–99)
GLUCOSE URINE: NEGATIVE MG/DL
HCT VFR BLD CALC: 42.7 % (ref 34–48)
HEMOGLOBIN: 14 G/DL (ref 11.5–15.5)
IMMATURE GRANULOCYTES #: 0.03 E9/L
IMMATURE GRANULOCYTES %: 0.4 % (ref 0–5)
KETONES, URINE: NEGATIVE MG/DL
LACTIC ACID: 0.8 MMOL/L (ref 0.5–2.2)
LEUKOCYTE ESTERASE, URINE: ABNORMAL
LIPASE: 25 U/L (ref 13–60)
LYMPHOCYTES ABSOLUTE: 1.59 E9/L (ref 1.5–4)
LYMPHOCYTES RELATIVE PERCENT: 20.5 % (ref 20–42)
MCH RBC QN AUTO: 31 PG (ref 26–35)
MCHC RBC AUTO-ENTMCNC: 32.8 % (ref 32–34.5)
MCV RBC AUTO: 94.5 FL (ref 80–99.9)
MONOCYTES ABSOLUTE: 0.8 E9/L (ref 0.1–0.95)
MONOCYTES RELATIVE PERCENT: 10.3 % (ref 2–12)
NEUTROPHILS ABSOLUTE: 4.92 E9/L (ref 1.8–7.3)
NEUTROPHILS RELATIVE PERCENT: 63.6 % (ref 43–80)
NITRITE, URINE: NEGATIVE
PDW BLD-RTO: 13.4 FL (ref 11.5–15)
PH UA: 7.5 (ref 5–9)
PLATELET # BLD: 260 E9/L (ref 130–450)
PMV BLD AUTO: 9.1 FL (ref 7–12)
POC ANION GAP: 13 MMOL/L (ref 7–16)
POC BUN: 38 MG/DL (ref 8–23)
POC CHLORIDE: 100 MMOL/L (ref 100–108)
POC CREATININE: 1.7 MG/DL (ref 0.5–1)
POC POTASSIUM: 4.1 MMOL/L (ref 3.5–5)
POC SODIUM: 140 MMOL/L (ref 132–146)
POTASSIUM SERPL-SCNC: 4.2 MMOL/L (ref 3.5–5)
PRO-BNP: 412 PG/ML (ref 0–450)
PROTEIN UA: NEGATIVE MG/DL
RBC # BLD: 4.52 E12/L (ref 3.5–5.5)
RBC UA: ABNORMAL /HPF (ref 0–2)
SODIUM BLD-SCNC: 139 MMOL/L (ref 132–146)
SPECIFIC GRAVITY UA: 1.01 (ref 1–1.03)
TOTAL CK: 16 U/L (ref 20–180)
TOTAL PROTEIN: 7.1 G/DL (ref 6.4–8.3)
TROPONIN: <0.01 NG/ML (ref 0–0.03)
UROBILINOGEN, URINE: 0.2 E.U./DL
WBC # BLD: 7.7 E9/L (ref 4.5–11.5)
WBC UA: ABNORMAL /HPF (ref 0–5)

## 2019-04-13 PROCEDURE — 83880 ASSAY OF NATRIURETIC PEPTIDE: CPT

## 2019-04-13 PROCEDURE — 94760 N-INVAS EAR/PLS OXIMETRY 1: CPT

## 2019-04-13 PROCEDURE — 80051 ELECTROLYTE PANEL: CPT

## 2019-04-13 PROCEDURE — 71045 X-RAY EXAM CHEST 1 VIEW: CPT

## 2019-04-13 PROCEDURE — 82550 ASSAY OF CK (CPK): CPT

## 2019-04-13 PROCEDURE — 84520 ASSAY OF UREA NITROGEN: CPT

## 2019-04-13 PROCEDURE — 83605 ASSAY OF LACTIC ACID: CPT

## 2019-04-13 PROCEDURE — 36415 COLL VENOUS BLD VENIPUNCTURE: CPT

## 2019-04-13 PROCEDURE — 70450 CT HEAD/BRAIN W/O DYE: CPT

## 2019-04-13 PROCEDURE — 81001 URINALYSIS AUTO W/SCOPE: CPT

## 2019-04-13 PROCEDURE — 74176 CT ABD & PELVIS W/O CONTRAST: CPT

## 2019-04-13 PROCEDURE — 85025 COMPLETE CBC W/AUTO DIFF WBC: CPT

## 2019-04-13 PROCEDURE — 82565 ASSAY OF CREATININE: CPT

## 2019-04-13 PROCEDURE — 80053 COMPREHEN METABOLIC PANEL: CPT

## 2019-04-13 PROCEDURE — 83690 ASSAY OF LIPASE: CPT

## 2019-04-13 PROCEDURE — 2580000003 HC RX 258: Performed by: EMERGENCY MEDICINE

## 2019-04-13 PROCEDURE — 84484 ASSAY OF TROPONIN QUANT: CPT

## 2019-04-13 PROCEDURE — 99285 EMERGENCY DEPT VISIT HI MDM: CPT

## 2019-04-13 PROCEDURE — 72125 CT NECK SPINE W/O DYE: CPT

## 2019-04-13 PROCEDURE — 82947 ASSAY GLUCOSE BLOOD QUANT: CPT

## 2019-04-13 RX ORDER — 0.9 % SODIUM CHLORIDE 0.9 %
500 INTRAVENOUS SOLUTION INTRAVENOUS ONCE
Status: COMPLETED | OUTPATIENT
Start: 2019-04-13 | End: 2019-04-13

## 2019-04-13 RX ADMIN — SODIUM CHLORIDE 500 ML: 9 INJECTION, SOLUTION INTRAVENOUS at 20:52

## 2019-04-13 ASSESSMENT — PAIN SCALES - GENERAL: PAINLEVEL_OUTOF10: 9

## 2019-04-13 ASSESSMENT — ENCOUNTER SYMPTOMS
ABDOMINAL PAIN: 1
RHINORRHEA: 0
EYE PAIN: 0
SHORTNESS OF BREATH: 0
SORE THROAT: 0
WHEEZING: 0
BACK PAIN: 0
COUGH: 0
ABDOMINAL DISTENTION: 0
NAUSEA: 0
EYE DISCHARGE: 0
DIARRHEA: 1
VOMITING: 0
EYE REDNESS: 0

## 2019-04-13 ASSESSMENT — PAIN DESCRIPTION - DESCRIPTORS: DESCRIPTORS: ACHING

## 2019-04-13 ASSESSMENT — PAIN DESCRIPTION - ONSET: ONSET: ON-GOING

## 2019-04-13 ASSESSMENT — PAIN DESCRIPTION - FREQUENCY: FREQUENCY: CONTINUOUS

## 2019-04-13 ASSESSMENT — PAIN DESCRIPTION - ORIENTATION: ORIENTATION: LEFT

## 2019-04-13 ASSESSMENT — PAIN DESCRIPTION - PROGRESSION: CLINICAL_PROGRESSION: NOT CHANGED

## 2019-04-13 ASSESSMENT — PAIN DESCRIPTION - PAIN TYPE: TYPE: ACUTE PAIN

## 2019-04-13 ASSESSMENT — PAIN DESCRIPTION - LOCATION: LOCATION: ARM;HEAD

## 2019-04-13 NOTE — ED PROVIDER NOTES
Negative for adenopathy. All other systems reviewed and are negative. BP (!) 171/75   Pulse 54   Temp 98.2 °F (36.8 °C) (Oral)   Resp 16   Ht 5' 6\" (1.676 m)   Wt 256 lb (116.1 kg)   SpO2 98%   BMI 41.32 kg/m²     Physical Exam   Constitutional: She is oriented to person, place, and time. She appears well-developed and well-nourished. No distress. 65yo obese female laying in bed in no acute distress. HENT:   Head: Normocephalic and atraumatic. Right Ear: External ear normal.   Left Ear: External ear normal.   Nose: Nose normal.   Mouth/Throat: Oropharynx is clear and moist. No oropharyngeal exudate. Eyes: Pupils are equal, round, and reactive to light. Conjunctivae and EOM are normal. Left eye exhibits no discharge. No scleral icterus. Neck: Normal range of motion. Neck supple. No JVD present. No tracheal deviation present. Cardiovascular: Normal rate, regular rhythm and normal heart sounds. No murmur heard. Pulmonary/Chest: Effort normal and breath sounds normal. No respiratory distress. She has no wheezes. She has no rales. Normal effort. No distress. SPO2 98% RA. Abdominal: Soft. Bowel sounds are normal. There is tenderness in the right lower quadrant and left lower quadrant. There is no rigidity, no rebound, no guarding, no CVA tenderness, no tenderness at McBurney's point and negative De La Paz's sign. Obese abdomen. Old surgical scars noted. Tenderness to palpation to the lower abdomen. No rebound or guarding. No surgical abdomen or peritoneal signs. Musculoskeletal: She exhibits edema (Bilateral lower extremity lymphedema. ). No tenderness to palpation to the cervical, thoracic, or lumbar spines. No obvious deformity or step-off. Lymphadenopathy:     She has no cervical adenopathy. Neurological: She is alert and oriented to person, place, and time. She is not disoriented. She displays no tremor. No cranial nerve deficit or sensory deficit.  She displays no seizure activity. She is awake, alert and oriented. Expressive aphasia--she reports she knows what she wants to say but has difficulty getting words out from a prior stroke.  strength 3/5 bilaterally. She is able to move both arms well. She can minimally raise each leg off the bed. Sensation intact to light touch and symetric. No tremors or seizure like activity. Skin: Skin is warm and dry. She is not diaphoretic. Nursing note and vitals reviewed. Procedures    MDM  Number of Diagnoses or Management Options  Abdominal pain, unspecified abdominal location:   Acute kidney injury [facility]):   Dehydration:   Fall, initial encounter:   Lung nodule:   Diagnosis management comments: Patient is a 58-year-old female presents to the ED for multiple falls, left arm pain and abdominal pain. Labs and imaging were obtained and are consistent with mild dehydration. Diagnoses considered are intracranial bleed, hemorrhage, infection, electrolyte abnormality. No leukocytosis. No anemia. No urinary tract infection. Electrolytes are within appropriate limits. Her creatinine is elevated from 1.3 to 1.7. Will hydrate with 500 mL IV fluid bolus. Troponin is within normal limits. Considered CHF given patient's bilateral lower extremity edema there appears to be no failure at this time. Also considered rhabdomyolysis however CK is within normal limits. CT head reveals old infarct but no acute intracranial hemorrhage or mass effect. CT of the cervical spine showed no acute fracture dislocation but did show a nonspecific lung nodule and this was discussed with the patient she is aware and agrees to follow-up with her family doctor. CT of abdomen pelvis without contrast was obtained reveals no acute abnormality. Chest x-ray reveals no acute CHF or pneumonia. Patient was hydrated. She will be discharged home with outpatient follow-up. This was relayed to the patient and her daughter who is present at bedside upon discharge.  They're told to questions were answered at this time.    [AD]      ED Course User Index  [AD] Hemant Caballero DO  [JS] Carly Smallwood DO     EKG Interpretation    Interpreted by emergency department physician    Rhythm: sinus bradycardia and 1 degree AV block  Rate: 58  Axis: normal  Ectopy: none  Conduction: normal  ST Segments: normal  T Waves: normal  Q Waves: none    Clinical Impression: sinus bradycardia with 1st degree AV block. Compared with prior EKG on 8-28-18 and there is no signficant change. Hemant Caballero DO PGY 4      ISTAT Testing preformed in the ED:    Test  Normal Range  Result  Na  138 - 146         140  K  3.5 - 4.9   4.1  Cl  98 - 109             100  TCO2  24 - 29              27    Glucose 70 - 105  91  BUN  8 - 26    38  Cr  0.6 - 1.3   1.7  [place] 10 - 20   -    ED Course as of Apr 13 2141   Sat Apr 13, 2019   Herb   ATTENDING PROVIDER ATTESTATION:     I have personally performed and/or participated in the history, exam, medical decision making, and procedures and agree with all pertinent clinical information unless otherwise noted. I have also reviewed and agree with the past medical, family and social history unless otherwise noted. I have discussed this patient in detail with the resident and provided the instruction and education regarding the evidence-based evaluation and treatment of [unfilled]  History: patient presents with complaint of left upper extremity pain, abdominal pain and increased lower extremity edema. She states she fell a  few days ago. She denies LOC or striking her head. No CP or SOB. No n/v/d/c. She has aphasia secondary to previous CVA and is difficult to obtain a history from. My findings: Vamsi West is a 75 y.o. female whom is in no distress. Physical exam reveals head atraumatic, no midline cervical tenderness. Diffuse abdominal tenderness without R/G/R. Lymphedema of the lower extremities b/l. Mild peripheral edema of b/l UE.   Heart RRR, lungs CTA. She is alert and oriented. My plan: Symptomatic and supportive care. CT abdomen and labs. Electronically signed by Galindo Fuentes DO on 4/13/19 at 3:50 PM          [JS]   2035 Reevaluated patient and she is sitting at the edge of the bed with her walker. She reports she is ready to go home. I discussed the results and plan with her and she is comfortable to go home and follow up with her family doctor within 2-3 days. Discussed findings of blood work and imaging. The patient's daughters present at bedside. They agree to follow up with PCP and to return to the ED for any worsening symptoms. Patient ambulated to the bathroom with a walker well and without difficulty. Their questions were answered at this time.    [AD]      ED Course User Index  [AD] Enoc Barriga DO  [JS] Galindo Fuentes DO       --------------------------------------------- PAST HISTORY ---------------------------------------------  Past Medical History:  has a past medical history of Acute bronchitis, Acute on chronic diastolic (congestive) heart failure (HCC), Acute renal failure ([address] Acute renal failure with acute cortical necrosis ([address] Acute-on-chronic kidney injury ([address] Atrial fibrillation ([address] CAD (coronary artery disease), Cancer ([address] CHF (congestive heart failure) ([address] Depression, Enteritis, H/O echocardiogram, History of cardiovascular stress test, History of cardiovascular stress test, History of echocardiogram, History of echocardiogram, Hyperkalemia, Hypertension, Hypothyroidism, Restless leg syndrome, Stroke ([address] Unspecified cerebral artery occlusion with cerebral infarction, and Unstable angina ([address] Past Surgical History:  has a past surgical history that includes Cardiac surgery; Hysterectomy; joint replacement; hiatal hernia repair; Gallbladder surgery; Thyroid surgery; Thyroidectomy; Cholecystectomy; hernia repair; Echo Complete (1/20/2014 EF62%);  Diagnostic Cardiac Cath the left lung apex, which is stable   compared to the previous CTA chest from 12/14/2017, and statistically   benign. CT ABDOMEN PELVIS WO CONTRAST   Final Result   No acute intra-abdominal or pelvic pathology. XR CHEST PORTABLE   Final Result   Stable mild cardiomegaly without evidence of acute cardiopulmonary   disease.                   ------------------------- NURSING NOTES AND VITALS REVIEWED ---------------------------  Date / Time Roomed:  4/13/2019  3:23 PM  ED Bed Assignment:  13/13    The nursing notes within the ED encounter and vital signs as below have been reviewed. BP (!) 171/75   Pulse 54   Temp 98.2 °F (36.8 °C) (Oral)   Resp 16   Ht 5' 6\" (1.676 m)   Wt 256 lb (116.1 kg)   SpO2 98%   BMI 41.32 kg/m²   Oxygen Saturation Interpretation: Normal      ------------------------------------------ PROGRESS NOTES ------------------------------------------    ED Course as of Apr 13 2141   Sat Apr 13, 2019   Herb   ATTENDING PROVIDER ATTESTATION:     I have personally performed and/or participated in the history, exam, medical decision making, and procedures and agree with all pertinent clinical information unless otherwise noted. I have also reviewed and agree with the past medical, family and social history unless otherwise noted. I have discussed this patient in detail with the resident and provided the instruction and education regarding the evidence-based evaluation and treatment of [unfilled]  History: patient presents with complaint of left upper extremity pain, abdominal pain and increased lower extremity edema. She states she fell a  few days ago. She denies LOC or striking her head. No CP or SOB. No n/v/d/c. She has aphasia secondary to previous CVA and is difficult to obtain a history from. My findings: Alla Quinonez is a 75 y.o. female whom is in no distress. Physical exam reveals head atraumatic, no midline cervical tenderness.   Diffuse abdominal Diagnosis:  1. Dehydration    2. Acute kidney injury ([facility]    3. Fall, initial encounter    4. Abdominal pain, unspecified abdominal location    5. Lung nodule        Disposition:  Patient's disposition: Discharge to home  Patient's condition is stable.            Garth Guzman,   Resident  04/13/19 1972

## 2019-04-13 NOTE — ED NOTES
EKG completed and given to provider. Telemetry and pulse ox in place.       Les Valerio RN  04/13/19 6889

## 2019-04-13 NOTE — ED NOTES
Blood work that was ordered at the time of draw has been sent to lab. Xray notified of need for portable. Telemetry and pulse ox in place. Pt ready to go to CT, IStat completed, results to provider.       Keely Jessica RN  04/13/19 5900

## 2019-04-19 LAB
EKG ATRIAL RATE: 58 BPM
EKG P AXIS: 33 DEGREES
EKG P-R INTERVAL: 228 MS
EKG Q-T INTERVAL: 454 MS
EKG QRS DURATION: 106 MS
EKG QTC CALCULATION (BAZETT): 445 MS
EKG R AXIS: -3 DEGREES
EKG T AXIS: 58 DEGREES
EKG VENTRICULAR RATE: 58 BPM

## 2019-05-23 DIAGNOSIS — Z76.0 MEDICATION REFILL: ICD-10-CM

## 2019-05-24 RX ORDER — GABAPENTIN 300 MG/1
CAPSULE ORAL
Qty: 90 CAPSULE | Refills: 0 | Status: ON HOLD
Start: 2019-05-24 | End: 2020-05-05

## 2019-06-11 ENCOUNTER — TELEPHONE (OUTPATIENT)
Dept: FAMILY MEDICINE CLINIC | Age: 74
End: 2019-06-11

## 2019-06-11 NOTE — TELEPHONE ENCOUNTER
Letter sent to patient regarding overdue mammogram. Letter included dates and times of upcoming [address] that is here at [facility] and printed order for patient.

## 2020-01-28 LAB
CHOLESTEROL, TOTAL: NORMAL
CHOLESTEROL/HDL RATIO: NORMAL
HDLC SERPL-MCNC: NORMAL MG/DL
LDL CHOLESTEROL CALCULATED: NORMAL
NONHDLC SERPL-MCNC: NORMAL MG/DL
TRIGL SERPL-MCNC: NORMAL MG/DL
VLDLC SERPL CALC-MCNC: NORMAL MG/DL

## 2020-03-14 ENCOUNTER — APPOINTMENT (OUTPATIENT)
Dept: GENERAL RADIOLOGY | Age: 75
DRG: 421 | End: 2020-03-14
Payer: MEDICAID

## 2020-03-14 ENCOUNTER — HOSPITAL ENCOUNTER (INPATIENT)
Age: 75
LOS: 1 days | Discharge: LEFT BEFORE DISCHARGE | DRG: 421 | End: 2020-03-15
Attending: EMERGENCY MEDICINE | Admitting: INTERNAL MEDICINE
Payer: MEDICAID

## 2020-03-14 PROBLEM — R62.51 FAILURE TO THRIVE (0-17): Status: ACTIVE | Noted: 2020-03-14

## 2020-03-14 LAB
ANION GAP SERPL CALCULATED.3IONS-SCNC: 9 MMOL/L (ref 7–16)
BUN BLDV-MCNC: 19 MG/DL (ref 8–23)
CALCIUM SERPL-MCNC: 9.9 MG/DL (ref 8.6–10.2)
CHLORIDE BLD-SCNC: 108 MMOL/L (ref 98–107)
CO2: 22 MMOL/L (ref 22–29)
CREAT SERPL-MCNC: 1.1 MG/DL (ref 0.5–1)
GFR AFRICAN AMERICAN: 58
GFR NON-AFRICAN AMERICAN: 48 ML/MIN/1.73
GLUCOSE BLD-MCNC: 98 MG/DL (ref 74–99)
HCT VFR BLD CALC: 44.6 % (ref 34–48)
HEMOGLOBIN: 14.2 G/DL (ref 11.5–15.5)
MCH RBC QN AUTO: 30.2 PG (ref 26–35)
MCHC RBC AUTO-ENTMCNC: 31.8 % (ref 32–34.5)
MCV RBC AUTO: 94.9 FL (ref 80–99.9)
METER GLUCOSE: 92 MG/DL (ref 74–99)
PDW BLD-RTO: 13.3 FL (ref 11.5–15)
PLATELET # BLD: 277 E9/L (ref 130–450)
PMV BLD AUTO: 9.5 FL (ref 7–12)
POTASSIUM SERPL-SCNC: 4.1 MMOL/L (ref 3.5–5)
POTASSIUM SERPL-SCNC: 5.8 MMOL/L (ref 3.5–5)
PRO-BNP: 675 PG/ML (ref 0–450)
RBC # BLD: 4.7 E12/L (ref 3.5–5.5)
SODIUM BLD-SCNC: 139 MMOL/L (ref 132–146)
TROPONIN: <0.01 NG/ML (ref 0–0.03)
TSH SERPL DL<=0.05 MIU/L-ACNC: 1.06 UIU/ML (ref 0.27–4.2)
WBC # BLD: 7 E9/L (ref 4.5–11.5)

## 2020-03-14 PROCEDURE — 82962 GLUCOSE BLOOD TEST: CPT

## 2020-03-14 PROCEDURE — 80048 BASIC METABOLIC PNL TOTAL CA: CPT

## 2020-03-14 PROCEDURE — 84443 ASSAY THYROID STIM HORMONE: CPT

## 2020-03-14 PROCEDURE — 71045 X-RAY EXAM CHEST 1 VIEW: CPT

## 2020-03-14 PROCEDURE — 84132 ASSAY OF SERUM POTASSIUM: CPT

## 2020-03-14 PROCEDURE — 99285 EMERGENCY DEPT VISIT HI MDM: CPT

## 2020-03-14 PROCEDURE — 93005 ELECTROCARDIOGRAM TRACING: CPT | Performed by: EMERGENCY MEDICINE

## 2020-03-14 PROCEDURE — 6370000000 HC RX 637 (ALT 250 FOR IP): Performed by: EMERGENCY MEDICINE

## 2020-03-14 PROCEDURE — 1200000000 HC SEMI PRIVATE

## 2020-03-14 PROCEDURE — 85027 COMPLETE CBC AUTOMATED: CPT

## 2020-03-14 PROCEDURE — 84484 ASSAY OF TROPONIN QUANT: CPT

## 2020-03-14 PROCEDURE — 83880 ASSAY OF NATRIURETIC PEPTIDE: CPT

## 2020-03-14 RX ORDER — HEPARIN SODIUM 5000 [USP'U]/ML
5000 INJECTION, SOLUTION INTRAVENOUS; SUBCUTANEOUS 2 TIMES DAILY
Status: DISCONTINUED | OUTPATIENT
Start: 2020-03-14 | End: 2020-03-15 | Stop reason: HOSPADM

## 2020-03-14 RX ORDER — ROPINIROLE 0.5 MG/1
0.5 TABLET, FILM COATED ORAL 3 TIMES DAILY
Status: DISCONTINUED | OUTPATIENT
Start: 2020-03-14 | End: 2020-03-15 | Stop reason: HOSPADM

## 2020-03-14 RX ADMIN — ROPINIROLE HYDROCHLORIDE 0.5 MG: 0.5 TABLET, FILM COATED ORAL at 23:45

## 2020-03-14 ASSESSMENT — PAIN DESCRIPTION - ORIENTATION: ORIENTATION: RIGHT;LEFT

## 2020-03-14 ASSESSMENT — ENCOUNTER SYMPTOMS
COUGH: 0
SHORTNESS OF BREATH: 1
VOMITING: 0
ABDOMINAL PAIN: 0

## 2020-03-14 ASSESSMENT — PAIN - FUNCTIONAL ASSESSMENT: PAIN_FUNCTIONAL_ASSESSMENT: PREVENTS OR INTERFERES SOME ACTIVE ACTIVITIES AND ADLS

## 2020-03-14 ASSESSMENT — PAIN DESCRIPTION - DESCRIPTORS: DESCRIPTORS: DISCOMFORT

## 2020-03-14 ASSESSMENT — PAIN DESCRIPTION - LOCATION: LOCATION: LEG;ABDOMEN

## 2020-03-14 ASSESSMENT — PAIN DESCRIPTION - FREQUENCY: FREQUENCY: CONTINUOUS

## 2020-03-14 ASSESSMENT — PAIN DESCRIPTION - PROGRESSION: CLINICAL_PROGRESSION: GRADUALLY WORSENING

## 2020-03-14 ASSESSMENT — PAIN SCALES - GENERAL: PAINLEVEL_OUTOF10: 10

## 2020-03-14 ASSESSMENT — PAIN DESCRIPTION - ONSET: ONSET: ON-GOING

## 2020-03-14 ASSESSMENT — PAIN DESCRIPTION - PAIN TYPE: TYPE: ACUTE PAIN
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
## 2020-03-15 VITALS
HEIGHT: 66 IN | WEIGHT: 218.7 LBS | HEART RATE: 96 BPM | DIASTOLIC BLOOD PRESSURE: 80 MMHG | SYSTOLIC BLOOD PRESSURE: 178 MMHG | TEMPERATURE: 97.9 F | RESPIRATION RATE: 18 BRPM | OXYGEN SATURATION: 95 % | BODY MASS INDEX: 35.15 KG/M2

## 2020-03-15 LAB
EKG ATRIAL RATE: 50 BPM
EKG P AXIS: 42 DEGREES
EKG P-R INTERVAL: 226 MS
EKG Q-T INTERVAL: 422 MS
EKG QRS DURATION: 88 MS
EKG QTC CALCULATION (BAZETT): 384 MS
EKG R AXIS: -3 DEGREES
EKG T AXIS: 33 DEGREES
EKG VENTRICULAR RATE: 50 BPM
HBA1C MFR BLD: 5.6 % (ref 4–5.6)
METER GLUCOSE: 85 MG/DL (ref 74–99)

## 2020-03-15 PROCEDURE — 36415 COLL VENOUS BLD VENIPUNCTURE: CPT

## 2020-03-15 PROCEDURE — 97161 PT EVAL LOW COMPLEX 20 MIN: CPT

## 2020-03-15 PROCEDURE — 82962 GLUCOSE BLOOD TEST: CPT

## 2020-03-15 PROCEDURE — 6360000002 HC RX W HCPCS: Performed by: INTERNAL MEDICINE

## 2020-03-15 PROCEDURE — 97530 THERAPEUTIC ACTIVITIES: CPT

## 2020-03-15 PROCEDURE — 6370000000 HC RX 637 (ALT 250 FOR IP): Performed by: EMERGENCY MEDICINE

## 2020-03-15 PROCEDURE — 83036 HEMOGLOBIN GLYCOSYLATED A1C: CPT

## 2020-03-15 PROCEDURE — 93010 ELECTROCARDIOGRAM REPORT: CPT | Performed by: INTERNAL MEDICINE

## 2020-03-15 RX ADMIN — HEPARIN SODIUM 5000 UNITS: 5000 INJECTION, SOLUTION INTRAVENOUS; SUBCUTANEOUS at 10:10

## 2020-03-15 RX ADMIN — ROPINIROLE HYDROCHLORIDE 0.5 MG: 0.5 TABLET, FILM COATED ORAL at 10:10

## 2020-03-15 RX ADMIN — ROPINIROLE HYDROCHLORIDE 0.5 MG: 0.5 TABLET, FILM COATED ORAL at 14:26

## 2020-03-15 ASSESSMENT — PAIN SCALES - GENERAL: PAINLEVEL_OUTOF10: 0
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
## 2020-03-15 NOTE — PROGRESS NOTES
stress test    History of echocardiogram 03/19/2016    EF 70-75%, There is doppler evidence of stage I diastolic dysfunction    History of echocardiogram 12/15/2017    EF 70%    Hyperkalemia 1/1/2014    Hypertension     Hypothyroidism     Restless leg syndrome     Stroke [facility])     Unspecified cerebral artery occlusion with cerebral infarction     Unstable angina ([facility] [address] 1/19/2014     Past Surgical History:   Procedure Laterality Date    CARDIAC SURGERY      CHOLECYSTECTOMY      DIAGNOSTIC CARDIAC CATH LAB PROCEDURE  09/10/2009    LV 70%. stenotic CAD. Previously placed BM stent: LAD-patent. Successful GUILLERMO of LAD: mid    DIAGNOSTIC CARDIAC CATH LAB PROCEDURE  10/11/2007    LVF well preserved, EF 70%. No MR. Patent coronary arteries. Patent stented segments in LAD.  DIAGNOSTIC CARDIAC CATH LAB PROCEDURE  09/27/2006    Patent stents in proximal and mid LAD with mild instent stenosis. Mild CAD of mid LAD 30% stenosis. Normal LVSF, EF 60%.  DIAGNOSTIC CARDIAC CATH LAB PROCEDURE  05/23/2003    EF and contraction pattern normal. No MR. Normal LVF. Atherosclerotic CAD.  ECHO COMPLETE  1/20/2014 EF62%    stage I diastolic    GALLBLADDER SURGERY      HERNIA REPAIR      HIATAL HERNIA REPAIR      HYSTERECTOMY      JOINT REPLACEMENT      THYROID SURGERY      THYROIDECTOMY         Precautions: falls , FWB (full weight bearing)     SUBJECTIVE:    Social history: Patient lives with family  in a home where she has 1st floor setup with 4 steps  to enter with 2 Rail. While completing session patient expressed she is still grieving the recent loss of her . Equipment owned: Wheelchair, [employer] and [address] [employer],      [place] [id number]   17/24    Nursing cleared patient for PT evaluation. The admitting diagnosis and active problem list as listed above have been reviewed prior to the initiation of this evaluation.       OBJECTIVE:   Initial Evaluation  Date: 3/15/2020 Treatment Date:     Short Patient practiced and was instructed in the following treatment: Sat edge of bed 5 minutes with Modified Independent to increase dynamic sitting balance and activity tolerance. patient then completed gait into hallway with fww for support. Min A required. Following gait patient used commode to urinate. States she is is unable to hold her urine with frequency. Patient following session up in bedside chair. At end of session, patient in chair with  call light and phone within reach,  all lines and tubes intact, nursing notified. Patient would benefit from continued skilled Physical Therapy to improve functional independence and quality of life. Patient's/ family goals   home  \"I want to get out of here\"      Patient and or family understand(s) diagnosis, prognosis, and plan of care. PLAN:    Physical Therapy care will be provided in accordance with the objectives noted above. Exercises and functional mobility practice will be used as well as appropriate assistive devices or modalities to obtain goals. Patient and family education will also be administered as needed. Frequency of treatments: Patient will be seen   daily  for therapeutic exercise, functional retraining, endurance activities, balance exercises, family and patient education. Time in  0930  Time out  0955    Total Treatment Time  25 minutes    Evaluation time includes thorough review of current medical information, gathering information on past medical history/social history and prior level of function, completion of standardized testing/informal observation of tasks, assessment of data, and development of Plan of care and goals.      CPT codes:  Low Complexity PT evaluation (49158)  Therapeutic activities (12009)   15 minutes  1 unit(s)    Heather Bai, PT

## 2020-05-05 ENCOUNTER — APPOINTMENT (OUTPATIENT)
Dept: GENERAL RADIOLOGY | Age: 75
DRG: 463 | End: 2020-05-05
Payer: MEDICAID

## 2020-05-05 ENCOUNTER — HOSPITAL ENCOUNTER (INPATIENT)
Age: 75
LOS: 1 days | Discharge: NURSING, REHAB OR LONG-TERM CARE | DRG: 463 | End: 2020-05-06
Attending: EMERGENCY MEDICINE | Admitting: INTERNAL MEDICINE
Payer: MEDICAID

## 2020-05-05 PROBLEM — N30.90 CYSTITIS: Status: ACTIVE | Noted: 2020-05-05

## 2020-05-05 LAB
ALBUMIN SERPL-MCNC: 4.1 G/DL (ref 3.5–5.2)
ALP BLD-CCNC: 123 U/L (ref 35–104)
ALT SERPL-CCNC: 13 U/L (ref 0–32)
ANION GAP SERPL CALCULATED.3IONS-SCNC: 10 MMOL/L (ref 7–16)
AST SERPL-CCNC: 12 U/L (ref 0–31)
BACTERIA: ABNORMAL /HPF
BASOPHILS ABSOLUTE: 0.04 E9/L (ref 0–0.2)
BASOPHILS RELATIVE PERCENT: 0.5 % (ref 0–2)
BILIRUB SERPL-MCNC: 0.3 MG/DL (ref 0–1.2)
BILIRUBIN URINE: NEGATIVE
BLOOD, URINE: ABNORMAL
BUN BLDV-MCNC: 27 MG/DL (ref 8–23)
CALCIUM SERPL-MCNC: 9.8 MG/DL (ref 8.6–10.2)
CHLORIDE BLD-SCNC: 108 MMOL/L (ref 98–107)
CLARITY: ABNORMAL
CO2: 23 MMOL/L (ref 22–29)
COLOR: YELLOW
CREAT SERPL-MCNC: 1.3 MG/DL (ref 0.5–1)
CRYSTALS, UA: ABNORMAL /HPF
EOSINOPHILS ABSOLUTE: 0.21 E9/L (ref 0.05–0.5)
EOSINOPHILS RELATIVE PERCENT: 2.6 % (ref 0–6)
GFR AFRICAN AMERICAN: 48
GFR NON-AFRICAN AMERICAN: 40 ML/MIN/1.73
GLUCOSE BLD-MCNC: 95 MG/DL (ref 74–99)
GLUCOSE URINE: NEGATIVE MG/DL
HCT VFR BLD CALC: 43.5 % (ref 34–48)
HEMOGLOBIN: 14.1 G/DL (ref 11.5–15.5)
IMMATURE GRANULOCYTES #: 0.03 E9/L
IMMATURE GRANULOCYTES %: 0.4 % (ref 0–5)
KETONES, URINE: NEGATIVE MG/DL
LACTIC ACID: 0.7 MMOL/L (ref 0.5–2.2)
LEUKOCYTE ESTERASE, URINE: ABNORMAL
LYMPHOCYTES ABSOLUTE: 1.97 E9/L (ref 1.5–4)
LYMPHOCYTES RELATIVE PERCENT: 24.1 % (ref 20–42)
MCH RBC QN AUTO: 31.1 PG (ref 26–35)
MCHC RBC AUTO-ENTMCNC: 32.4 % (ref 32–34.5)
MCV RBC AUTO: 95.8 FL (ref 80–99.9)
MONOCYTES ABSOLUTE: 0.54 E9/L (ref 0.1–0.95)
MONOCYTES RELATIVE PERCENT: 6.6 % (ref 2–12)
NEUTROPHILS ABSOLUTE: 5.39 E9/L (ref 1.8–7.3)
NEUTROPHILS RELATIVE PERCENT: 65.8 % (ref 43–80)
NITRITE, URINE: POSITIVE
PDW BLD-RTO: 13.4 FL (ref 11.5–15)
PH UA: 8 (ref 5–9)
PLATELET # BLD: 258 E9/L (ref 130–450)
PMV BLD AUTO: 9.2 FL (ref 7–12)
POTASSIUM SERPL-SCNC: 4.5 MMOL/L (ref 3.5–5)
PRO-BNP: 392 PG/ML (ref 0–450)
PROTEIN UA: 30 MG/DL
RBC # BLD: 4.54 E12/L (ref 3.5–5.5)
RBC UA: ABNORMAL /HPF (ref 0–2)
SARS-COV-2, NAAT: NOT DETECTED
SODIUM BLD-SCNC: 141 MMOL/L (ref 132–146)
SPECIFIC GRAVITY UA: 1.02 (ref 1–1.03)
TOTAL PROTEIN: 6.9 G/DL (ref 6.4–8.3)
TROPONIN: <0.01 NG/ML (ref 0–0.03)
TSH SERPL DL<=0.05 MIU/L-ACNC: 0.38 UIU/ML (ref 0.27–4.2)
UROBILINOGEN, URINE: 0.2 E.U./DL
WBC # BLD: 8.2 E9/L (ref 4.5–11.5)
WBC UA: ABNORMAL /HPF (ref 0–5)

## 2020-05-05 PROCEDURE — 83880 ASSAY OF NATRIURETIC PEPTIDE: CPT

## 2020-05-05 PROCEDURE — 6360000002 HC RX W HCPCS: Performed by: INTERNAL MEDICINE

## 2020-05-05 PROCEDURE — 2580000003 HC RX 258: Performed by: INTERNAL MEDICINE

## 2020-05-05 PROCEDURE — 96374 THER/PROPH/DIAG INJ IV PUSH: CPT

## 2020-05-05 PROCEDURE — 85025 COMPLETE CBC W/AUTO DIFF WBC: CPT

## 2020-05-05 PROCEDURE — 6370000000 HC RX 637 (ALT 250 FOR IP): Performed by: PHYSICIAN ASSISTANT

## 2020-05-05 PROCEDURE — G0378 HOSPITAL OBSERVATION PER HR: HCPCS

## 2020-05-05 PROCEDURE — 71046 X-RAY EXAM CHEST 2 VIEWS: CPT

## 2020-05-05 PROCEDURE — 93005 ELECTROCARDIOGRAM TRACING: CPT | Performed by: PHYSICIAN ASSISTANT

## 2020-05-05 PROCEDURE — 80053 COMPREHEN METABOLIC PANEL: CPT

## 2020-05-05 PROCEDURE — 83605 ASSAY OF LACTIC ACID: CPT

## 2020-05-05 PROCEDURE — 84484 ASSAY OF TROPONIN QUANT: CPT

## 2020-05-05 PROCEDURE — U0002 COVID-19 LAB TEST NON-CDC: HCPCS

## 2020-05-05 PROCEDURE — 84443 ASSAY THYROID STIM HORMONE: CPT

## 2020-05-05 PROCEDURE — 1200000000 HC SEMI PRIVATE

## 2020-05-05 PROCEDURE — 81001 URINALYSIS AUTO W/SCOPE: CPT

## 2020-05-05 PROCEDURE — 99283 EMERGENCY DEPT VISIT LOW MDM: CPT

## 2020-05-05 RX ORDER — CEPHALEXIN 250 MG/1
500 CAPSULE ORAL ONCE
Status: COMPLETED | OUTPATIENT
Start: 2020-05-05 | End: 2020-05-05

## 2020-05-05 RX ORDER — ALLOPURINOL 100 MG/1
100 TABLET ORAL DAILY
Status: DISCONTINUED | OUTPATIENT
Start: 2020-05-06 | End: 2020-05-06 | Stop reason: HOSPADM

## 2020-05-05 RX ORDER — ATORVASTATIN CALCIUM 10 MG/1
10 TABLET, FILM COATED ORAL DAILY
COMMUNITY

## 2020-05-05 RX ORDER — FLUOXETINE 10 MG/1
10 CAPSULE ORAL DAILY
Status: DISCONTINUED | OUTPATIENT
Start: 2020-05-06 | End: 2020-05-06 | Stop reason: HOSPADM

## 2020-05-05 RX ORDER — TORSEMIDE 10 MG/1
20 TABLET ORAL DAILY
COMMUNITY
End: 2021-02-17 | Stop reason: DRUGHIGH

## 2020-05-05 RX ORDER — LEVOTHYROXINE SODIUM 0.15 MG/1
150 TABLET ORAL DAILY
Status: DISCONTINUED | OUTPATIENT
Start: 2020-05-06 | End: 2020-05-06 | Stop reason: HOSPADM

## 2020-05-05 RX ORDER — ATORVASTATIN CALCIUM 10 MG/1
10 TABLET, FILM COATED ORAL DAILY
Status: DISCONTINUED | OUTPATIENT
Start: 2020-05-06 | End: 2020-05-06 | Stop reason: HOSPADM

## 2020-05-05 RX ORDER — TOPIRAMATE 100 MG/1
100 TABLET, FILM COATED ORAL NIGHTLY
Status: DISCONTINUED | OUTPATIENT
Start: 2020-05-05 | End: 2020-05-06 | Stop reason: HOSPADM

## 2020-05-05 RX ORDER — TORSEMIDE 20 MG/1
20 TABLET ORAL DAILY
Status: DISCONTINUED | OUTPATIENT
Start: 2020-05-06 | End: 2020-05-06 | Stop reason: HOSPADM

## 2020-05-05 RX ORDER — BUPROPION HYDROCHLORIDE 150 MG/1
150 TABLET ORAL EVERY MORNING
Status: DISCONTINUED | OUTPATIENT
Start: 2020-05-06 | End: 2020-05-06 | Stop reason: HOSPADM

## 2020-05-05 RX ORDER — ROPINIROLE 0.5 MG/1
0.5 TABLET, FILM COATED ORAL DAILY
Status: DISCONTINUED | OUTPATIENT
Start: 2020-05-06 | End: 2020-05-06 | Stop reason: HOSPADM

## 2020-05-05 RX ORDER — METOPROLOL SUCCINATE 100 MG/1
100 TABLET, EXTENDED RELEASE ORAL DAILY
Status: DISCONTINUED | OUTPATIENT
Start: 2020-05-06 | End: 2020-05-06 | Stop reason: HOSPADM

## 2020-05-05 RX ORDER — TOPIRAMATE 100 MG/1
100 TABLET, FILM COATED ORAL NIGHTLY
COMMUNITY

## 2020-05-05 RX ORDER — BUPROPION HYDROCHLORIDE 150 MG/1
150 TABLET ORAL EVERY MORNING
COMMUNITY

## 2020-05-05 RX ADMIN — CEPHALEXIN 500 MG: 250 CAPSULE ORAL at 19:11

## 2020-05-05 RX ADMIN — CEFTRIAXONE SODIUM 1 G: 1 INJECTION, POWDER, FOR SOLUTION INTRAMUSCULAR; INTRAVENOUS at 22:44

## 2020-05-05 ASSESSMENT — PAIN DESCRIPTION - LOCATION: LOCATION: LEG

## 2020-05-05 ASSESSMENT — PAIN DESCRIPTION - ORIENTATION: ORIENTATION: RIGHT;LEFT

## 2020-05-05 ASSESSMENT — PAIN DESCRIPTION - DESCRIPTORS: DESCRIPTORS: ACHING;CONSTANT;DULL

## 2020-05-05 ASSESSMENT — PAIN DESCRIPTION - ONSET: ONSET: ON-GOING

## 2020-05-05 ASSESSMENT — PAIN SCALES - GENERAL: PAINLEVEL_OUTOF10: 8

## 2020-05-05 ASSESSMENT — PAIN DESCRIPTION - PROGRESSION: CLINICAL_PROGRESSION: GRADUALLY WORSENING

## 2020-05-05 ASSESSMENT — PAIN - FUNCTIONAL ASSESSMENT: PAIN_FUNCTIONAL_ASSESSMENT: PREVENTS OR INTERFERES WITH MANY ACTIVE NOT PASSIVE ACTIVITIES

## 2020-05-05 ASSESSMENT — PAIN DESCRIPTION - FREQUENCY: FREQUENCY: CONTINUOUS

## 2020-05-05 ASSESSMENT — PAIN DESCRIPTION - PAIN TYPE: TYPE: ACUTE PAIN

## 2020-05-05 NOTE — ED PROVIDER NOTES
ED Attending  CC: No                                                                                                                                    Department of Emergency Medicine   ED  Provider Note  Admit Date/RoomTime: 5/5/2020  5:20 PM  ED Room: 17/17        HPI:  5/5/20,   Time: 5:32 PM EDT         Ailin Ocampo is a 67 y.o. female presenting to the ED for LE swelling, confusion and CP, beginning 1 day ago. The complaint has been persistent, moderate in severity, and worsened by nothing. The patient was sent to this facility for admission and nursing home placement. She resides at home with her daughter. Apparently the daughter had some surgery this morning and was admitted to the hospital.  The patient admits that she has had 6 strokes and has significant issues with her memory. She has a history of hypertension, coronary artery disease, hypothyroidism, paroxysmal atrial fibrillation, restless leg syndrome, chronic kidney disease, morbid obesity, and lymphedema. The patient states that this morning she did have a little episode of left-sided chest pain. She has no chest pain at this time. She reports intermittent shortness of breath some of which is chronic. She has chronic swelling in her lower extremities but states that it seems a bit worse over the past few days. We did receive call from her PCP, Dr. Ivan Magallanes that the patient needs admitted and to be seen by FABIENNE ESCAMILLA [facility] for placement.         ROS:     Constitutional: Negative for fever and chills  HENT: Negative for ear pain, sore throat and sinus pressure  Eyes: Negative for pain, discharge and redness  Respiratory: See HPI  Cardiovascular:  See HPI  Gastrointestinal: Negative for nausea, vomiting, diarrhea and abdominal distention  Genitourinary: Negative for dysuria and frequency  Musculoskeletal: See HPI  Skin: Negative for rash and wound  Neurological:  See HPI  Hematological: Negative for adenopathy    All other systems reviewed and are UA 8.0 5.0 - 9.0    Protein, UA 30 (A) Negative mg/dL    Urobilinogen, Urine 0.2 <2.0 E.U./dL    Nitrite, Urine POSITIVE (A) Negative    Leukocyte Esterase, Urine MODERATE (A) Negative   TSH without Reflex   Result Value Ref Range    TSH 0.384 0.270 - 4.200 uIU/mL   COVID-19   Result Value Ref Range    SARS-CoV-2, NAAT Not Detected Not Detected   Microscopic Urinalysis   Result Value Ref Range    WBC, UA 2-5 0 - 5 /HPF    RBC, UA 0-1 0 - 2 /HPF    Bacteria, UA MANY (A) None Seen /HPF    Crystals, UA Many (A) None Seen /HPF   EKG 12 Lead   Result Value Ref Range    Ventricular Rate 49 BPM    Atrial Rate 49 BPM    P-R Interval 236 ms    QRS Duration 104 ms    Q-T Interval 468 ms    QTc Calculation (Bazett) 422 ms    P Axis 45 degrees    R Axis 11 degrees    T Axis 60 degrees       RADIOLOGY:  Interpreted by Radiologist.  XR CHEST STANDARD (2 VW)   Final Result   No acute cardiopulmonary process demonstrated             ----------------- NURSING NOTES AND VITALS REVIEWED ---------------   The nursing notes within the ED encounter and vital signs as below have been reviewed. BP (!) 132/54   Pulse 51   Temp 98.7 °F (37.1 °C) (Oral)   Resp 18   Ht 5' 6\" (1.676 m)   Wt 220 lb (99.8 kg)   SpO2 97%   BMI 35.51 kg/m²   Oxygen Saturation Interpretation: Normal      --------------------------------PHYSICAL EXAM------------------------------------      Constitutional/General: Alert and oriented to place and name. She has trouble with some other medical questions regarding history. NAD  Head: NC/AT  Eyes: PERRL, EOMI  Mouth: Oropharynx clear, handling secretions, no trismus  Neck: Supple, full ROM, no meningeal signs  Pulmonary: Lungs decreased but clear to auscultation bilaterally, no wheezes, rales, or rhonchi. Not in respiratory distress  Cardiovascular:  Regular rate and rhythm, no murmurs, gallops, or rubs. 2+ distal pulses  Abdomen: Soft, + BS. No distension. Nontender.   No palpable rigidity, rebound or guarding  Extremities: Moves all extremities x 4. Marked 3+ bilateral edema. Her feet are unclean with dirt and debris noted plantar surface after removing shoes. No obvious skin breakdown/ulcers. Warm and well perfused  Skin: warm and dry without rash  Neurologic:  As above. Oriented to place and name. Appropriate with some minor confusion noted. Psych: Normal Affect      ------------------------ ED COURSE/MEDICAL DECISION MAKING----------------------  Medications   cephALEXin (KEFLEX) capsule 500 mg (500 mg Oral Given 5/5/20 1911)         Medical Decision Making:    Pt sent in for NH placement by PCP. She does have UTI. Daughter is currently hospitalized. Labs and CXR otherwise are good. Dr. Brooke Small discussed plan with PCP. Seen by SS as well. Counseling: The emergency provider has spoken with the patient and discussed todays results, in addition to providing specific details for the plan of care and counseling regarding the diagnosis and prognosis. Questions are answered at this time and they are agreeable with the plan.      ------------------------ IMPRESSION AND DISPOSITION -------------------------------    IMPRESSION  1. Acute cystitis without hematuria    2.  Bilateral lower extremity edema        DISPOSITION  Disposition: Admit to med/surg floor  Patient condition is stable                   June Black PA-C  05/05/20 2037

## 2020-05-06 VITALS
SYSTOLIC BLOOD PRESSURE: 137 MMHG | HEART RATE: 56 BPM | RESPIRATION RATE: 18 BRPM | BODY MASS INDEX: 35.36 KG/M2 | OXYGEN SATURATION: 96 % | HEIGHT: 66 IN | DIASTOLIC BLOOD PRESSURE: 71 MMHG | TEMPERATURE: 97.8 F | WEIGHT: 220 LBS

## 2020-05-06 PROBLEM — F01.50 VASCULAR DEMENTIA (HCC): Status: ACTIVE | Noted: 2020-05-06

## 2020-05-06 LAB
EKG ATRIAL RATE: 49 BPM
EKG P AXIS: 45 DEGREES
EKG P-R INTERVAL: 236 MS
EKG Q-T INTERVAL: 468 MS
EKG QRS DURATION: 104 MS
EKG QTC CALCULATION (BAZETT): 422 MS
EKG R AXIS: 11 DEGREES
EKG T AXIS: 60 DEGREES
EKG VENTRICULAR RATE: 49 BPM

## 2020-05-06 PROCEDURE — G0378 HOSPITAL OBSERVATION PER HR: HCPCS

## 2020-05-06 PROCEDURE — 97535 SELF CARE MNGMENT TRAINING: CPT

## 2020-05-06 PROCEDURE — 97530 THERAPEUTIC ACTIVITIES: CPT

## 2020-05-06 PROCEDURE — 97161 PT EVAL LOW COMPLEX 20 MIN: CPT | Performed by: PHYSICAL THERAPIST

## 2020-05-06 PROCEDURE — 97116 GAIT TRAINING THERAPY: CPT | Performed by: PHYSICAL THERAPIST

## 2020-05-06 PROCEDURE — 97165 OT EVAL LOW COMPLEX 30 MIN: CPT

## 2020-05-06 PROCEDURE — 6370000000 HC RX 637 (ALT 250 FOR IP): Performed by: INTERNAL MEDICINE

## 2020-05-06 RX ORDER — GABAPENTIN 300 MG/1
300 CAPSULE ORAL NIGHTLY
Qty: 90 CAPSULE | Refills: 0 | Status: ON HOLD | OUTPATIENT
Start: 2020-05-06 | End: 2021-09-09 | Stop reason: HOSPADM

## 2020-05-06 RX ADMIN — ALLOPURINOL 100 MG: 100 TABLET ORAL at 09:12

## 2020-05-06 RX ADMIN — ATORVASTATIN CALCIUM 10 MG: 10 TABLET, FILM COATED ORAL at 09:13

## 2020-05-06 RX ADMIN — RIVAROXABAN 20 MG: 20 TABLET, FILM COATED ORAL at 09:13

## 2020-05-06 RX ADMIN — BUPROPION HYDROCHLORIDE 150 MG: 150 TABLET, FILM COATED, EXTENDED RELEASE ORAL at 09:12

## 2020-05-06 RX ADMIN — ROPINIROLE HYDROCHLORIDE 0.5 MG: 0.5 TABLET, FILM COATED ORAL at 09:12

## 2020-05-06 RX ADMIN — METOPROLOL SUCCINATE 100 MG: 100 TABLET, EXTENDED RELEASE ORAL at 09:13

## 2020-05-06 RX ADMIN — LEVOTHYROXINE SODIUM 150 MCG: 150 TABLET ORAL at 05:13

## 2020-05-06 RX ADMIN — TORSEMIDE 20 MG: 20 TABLET ORAL at 09:13

## 2020-05-06 RX ADMIN — FLUOXETINE 10 MG: 10 CAPSULE ORAL at 09:13

## 2020-05-06 ASSESSMENT — PAIN DESCRIPTION - PAIN TYPE
TYPE: ACUTE PAIN
TYPE: ACUTE PAIN

## 2020-05-06 ASSESSMENT — PAIN DESCRIPTION - LOCATION
LOCATION: ABDOMEN
LOCATION: ABDOMEN

## 2020-05-06 ASSESSMENT — PAIN DESCRIPTION - DESCRIPTORS
DESCRIPTORS: ACHING;DISCOMFORT;TENDER
DESCRIPTORS: DISCOMFORT;ACHING;TENDER

## 2020-05-06 ASSESSMENT — PAIN DESCRIPTION - ORIENTATION: ORIENTATION: LOWER

## 2020-05-06 ASSESSMENT — PAIN SCALES - GENERAL
PAINLEVEL_OUTOF10: 7
PAINLEVEL_OUTOF10: 6

## 2020-05-06 NOTE — PLAN OF CARE
Problem: Falls - Risk of:  Goal: Will remain free from falls  Description: Will remain free from falls  5/6/2020 1114 by Justine Romero RN  Outcome: Met This Shift     Problem: Falls - Risk of:  Goal: Absence of physical injury  Description: Absence of physical injury  5/6/2020 1114 by Justine Romero RN  Outcome: Met This Shift     Problem: Pain:  Goal: Pain level will decrease  Description: Pain level will decrease  Outcome: Met This Shift     Problem: Pain:  Goal: Control of acute pain  Description: Control of acute pain  Outcome: Met This Shift     Problem: Pain:  Goal: Control of chronic pain  Description: Control of chronic pain  Outcome: Met This Shift

## 2020-05-06 NOTE — CARE COORDINATION
SS Note/Discharge plan:  Notified Pam Rodriguez admissions liaison for [facility] of d/c today, per physician, request transfer at 5:00pm, facility checking on their w/c van to provide transport, pt and nursing notified. Electronically signed by CHERY Packer Cea on 5/6/2020 at 1:31 PM

## 2020-05-06 NOTE — CARE COORDINATION
SS Note/Discharge plan:  Notified Marce Moody admissions liaison for [facility] of d/c today, request 5:00pm transfer, facility unable to provide transport, sw called [facility] med trans/ transport line, will arrange for a ambulette for 5:00pm  or later and will call the floor to confirm time once they have a ambulette provider arranged, confirmation # [phone], sw also contacted by Mary Kumar at [place] [address] who relayed that their agency has an open case on pt and anticipate they will need eventual guardianship and arrangements will need to be made for an AL placement long term, informed her of hospital d/c plan, she will follow with SNF liaison, pt and nursing notified.  Electronically signed by CHERY Grigsby on 5/6/2020 at 1:31 PM

## 2020-05-06 NOTE — PLAN OF CARE
Problem: Falls - Risk of:  Goal: Will remain free from falls  Description: Will remain free from falls  5/6/2020 1115 by Helen Brooks RN  Outcome: Met This Shift     Problem: Falls - Risk of:  Goal: Will remain free from falls  Description: Will remain free from falls  5/5/2020 2248 by Carla Ramirez RN  Outcome: Met This Shift     Problem: Falls - Risk of:  Goal: Will remain free from falls  Description: Will remain free from falls  5/6/2020 1114 by Helen Brooks RN  Outcome: Met This Shift     Problem: Falls - Risk of:  Goal: Absence of physical injury  Description: Absence of physical injury  5/6/2020 1115 by Helen Brooks RN  Outcome: Met This Shift     Problem: Falls - Risk of:  Goal: Absence of physical injury  Description: Absence of physical injury  5/6/2020 1114 by Helen Brooks RN  Outcome: Met This Shift     Problem: Pain:  Goal: Pain level will decrease  Description: Pain level will decrease  5/6/2020 1115 by Helen Brooks RN  Outcome: Met This Shift     Problem: Pain:  Goal: Pain level will decrease  Description: Pain level will decrease  5/6/2020 1114 by Helen Brooks RN  Outcome: Met This Shift     Problem: Pain:  Goal: Control of acute pain  Description: Control of acute pain  5/6/2020 1115 by Helen Brooks RN  Outcome: Met This Shift     Problem: Pain:  Goal: Control of acute pain  Description: Control of acute pain  5/6/2020 1114 by Helen Brooks RN  Outcome: Met This Shift

## 2020-05-06 NOTE — DISCHARGE INSTR - COC
unspecified formulation 09/08/2015    Influenza, Quadv, IM, PF (6 mo and older Fluzone, Flulaval, Fluarix, and 3 yrs and older Afluria) 11/22/2017    Pneumococcal Conjugate 13-valent ([place]) 02/05/2019    Pneumococcal Polysaccharide (Uopcevirt03) 04/15/2016       Active Problems:  Patient Active Problem List   Diagnosis Code    Essential hypertension I10    Coronary artery disease involving native coronary artery without angina pectoris I25.10    Depression F32.9    Dyslipidemia E78.5    Restless leg syndrome G25.81    Hypothyroidism E03.9    Peripheral edema R60.9    Chronic kidney disease, stage III (moderate) ([facility]) N18.3    Morbid obesity ([facility]) E66.01    Left upper extremity numbness R20.0    Lymphedema of both lower extremities I89.0    Chronic diastolic heart failure (HCC) I50.32    Paroxysmal atrial fibrillation ([facility]) I48.0    First degree atrioventricular block I44.0    Anasarca R60.1    Prediabetes R73.03    Failure to thrive (0-17) R62.51    Cystitis N30.90       Isolation/Infection:   Isolation          No Isolation        Patient Infection Status     Infection Onset Added Last Indicated Last Indicated By Review Planned Expiration Resolved Resolved By    None active    Resolved    COVID-19 Rule Out 05/05/20 05/05/20 05/05/20 COVID-19 (Ordered)   05/05/20 Rule-Out Test Resulted          Nurse Assessment:  Last Vital Signs: /71   Pulse 56   Temp 97.8 °F (36.6 °C) (Temporal)   Resp 18   Ht 5' 6\" (1.676 m)   Wt 220 lb (99.8 kg)   SpO2 96%   BMI 35.51 kg/m²     Last documented pain score (0-10 scale): Pain Level: 7  Last Weight:   Wt Readings from Last 1 Encounters:   05/05/20 220 lb (99.8 kg)     Mental Status:  alert    IV Access:  - None    Nursing Mobility/ADLs:  Walking   Assisted  Transfer  Assisted  Bathing  Assisted  Dressing  Assisted  Toileting  Assisted  Feeding  Assisted  Med Admin  Assisted  Med Delivery   whole    Wound Care Documentation and Therapy:

## 2020-05-06 NOTE — PROGRESS NOTES
Little  How much help from another person needed to walk in hospital room?: A Little  How much help from another person for climbing 3-5 steps with a railing?: A Little  AM-PAC Inpatient Mobility Raw Score : 20  AM-PAC Inpatient T-Scale Score : 47.67  Mobility Inpatient CMS 0-100% Score: 35.83  Mobility Inpatient CMS G-Code Modifier : [address] cleared patient for PT evaluation. The admitting diagnosis and active problem list as listed above have been reviewed prior to the initiation of this evaluation. OBJECTIVE:   Initial Evaluation  Date: 5/6/20 Treatment Date:     Short Term/ Long Term   Goals   Was pt agreeable to Eval/treatment? Yes    To be met in 3 days   Pain level   0/10        Bed Mobility    Rolling: Independent    Supine to sit:  Independent    Sit to supine: Independent    Scooting: Independent       Transfers Sit to stand: Supervision   From bed and commode  Sit to stand: Independent     Ambulation    2 x 75 feet using  wheeled walker with Supervision        100 feet using  wheeled walker with Independent    Stair negotiation: ascended and descended   5 steps with 2 rails min a     5 steps with one rail independent    ROM Within functional limits      Increase range of motion 10% of affected joints    Strength BUE: 4/5  RLE:  4/5  LLE:  4/5   Increase strength in affected mm groups by 1/3 grade   Balance Sitting EOB:  good    Dynamic Standing:  fair    Sitting EOB:  good    Dynamic Standing: good       Patient is Alert & Oriented x person, place, time and situation and follows directions    Sensation:  Patient denies numbness and tingling to extremities    Edema:  no   Endurance: fair          Patient education  Patient educated on role of Physical Therapy, risks of immobility and plan of care      Patient response to education:   Pt verbalized understanding Pt demonstrated skill Pt requires further education in this area   Yes Partial Yes      ASSESSMENT: Patient exhibits decreased strength,

## 2020-05-06 NOTE — H&P
dysfunction    History of echocardiogram 12/15/2017    EF 70%    Hyperkalemia 1/1/2014    Hypertension     Hypothyroidism     Restless leg syndrome     Stroke [facility])     pt stated 6 strokes    Unspecified cerebral artery occlusion with cerebral infarction     Unstable angina ([facility] [address] 1/19/2014       Past Surgical History:   Procedure Laterality Date    CARDIAC SURGERY      CHOLECYSTECTOMY      DIAGNOSTIC CARDIAC CATH LAB PROCEDURE  09/10/2009    LV 70%. stenotic CAD. Previously placed BM stent: LAD-patent. Successful GUILLERMO of LAD: mid    DIAGNOSTIC CARDIAC CATH LAB PROCEDURE  10/11/2007    LVF well preserved, EF 70%. No MR. Patent coronary arteries. Patent stented segments in LAD.  DIAGNOSTIC CARDIAC CATH LAB PROCEDURE  09/27/2006    Patent stents in proximal and mid LAD with mild instent stenosis. Mild CAD of mid LAD 30% stenosis. Normal LVSF, EF 60%.  DIAGNOSTIC CARDIAC CATH LAB PROCEDURE  05/23/2003    EF and contraction pattern normal. No MR. Normal LVF. Atherosclerotic CAD.      ECHO COMPLETE  1/20/2014 EF62%    stage I diastolic    GALLBLADDER SURGERY      HERNIA REPAIR      HIATAL HERNIA REPAIR      HYSTERECTOMY      JOINT REPLACEMENT      THYROID SURGERY      THYROIDECTOMY         Medications Prior to Admission:    Medications Prior to Admission: atorvastatin (LIPITOR) 10 MG tablet, Take 10 mg by mouth daily  buPROPion (WELLBUTRIN XL) 150 MG extended release tablet, Take 150 mg by mouth every morning  torsemide (DEMADEX) 10 MG tablet, Take 20 mg by mouth daily  topiramate (TOPAMAX) 100 MG tablet, Take 100 mg by mouth nightly  metoprolol succinate (TOPROL XL) 50 MG extended release tablet, TAKE 1 TABLET BY MOUTH DAILY (Patient taking differently: Take 100 mg by mouth daily )  rivaroxaban (XARELTO) 20 MG TABS tablet, Take 20 mg by mouth daily (with breakfast)  FLUoxetine (PROZAC) 10 MG capsule, Take 10 mg by mouth daily  rOPINIRole (REQUIP) 2 MG tablet, Take 0.5 tablets by mouth 3 times distress. HEENT:  Normocephalic, atraumatic. Pupils equal, round, reactive to light. No scleral icterus. No conjunctival injection. Normal lips, teeth, and gums. No nasal discharge. Neck:  Supple  Heart:  RRR, no murmurs, gallops, or rubs  Lungs:  CTA bilaterally, bilat symmetrical expansion, no wheeze, rales, or rhonchi  Abdomen: Bowel sounds present, soft, nontender, no masses, no organomegaly, no peritoneal signs  Extremities:  No clubbing, cyanosis, or edema  Skin:  Warm and dry, no open lesions or rash  Neuro:  Cranial nerves 2-12 intact, no focal deficits  Breast: deferred  Rectal: deferred  Genitalia:  deferred    LABS:  CBC:   Lab Results   Component Value Date    WBC 8.2 05/05/2020    RBC 4.54 05/05/2020    HGB 14.1 05/05/2020    HCT 43.5 05/05/2020    MCV 95.8 05/05/2020    MCH 31.1 05/05/2020    MCHC 32.4 05/05/2020    RDW 13.4 05/05/2020     05/05/2020    MPV 9.2 05/05/2020     CMP:    Lab Results   Component Value Date     05/05/2020    K 4.5 05/05/2020     05/05/2020    CO2 23 05/05/2020    BUN 27 05/05/2020    CREATININE 1.3 05/05/2020    GFRAA 48 05/05/2020    LABGLOM 40 05/05/2020    GLUCOSE 95 05/05/2020    GLUCOSE 91 04/10/2012    PROT 6.9 05/05/2020    LABALBU 4.1 05/05/2020    LABALBU 4.1 04/10/2012    CALCIUM 9.8 05/05/2020    BILITOT 0.3 05/05/2020    ALKPHOS 123 05/05/2020    AST 12 05/05/2020    ALT 13 05/05/2020         ASSESSMENT:      Active Problems:    Essential hypertension    Coronary artery disease involving native coronary artery without angina pectoris    Dyslipidemia    Morbid obesity (HCC)    Paroxysmal atrial fibrillation (HCC)    Cystitis    Vascular dementia ([facility] [address]  Resolved Problems:    * No resolved hospital problems.  *      PLAN:    Per orders        Please note that over 50 minutes was spent in evaluating the patient, review of records and results, discussion with staff/family, etc.      Electronically signed by Estefany Moe MD on 5/6/2020 at 4:20 PM

## 2020-05-24 NOTE — PROGRESS NOTES
Problem: Safety  Goal: Will remain free from falls  Outcome: PROGRESSING AS EXPECTED  Note:   Patient uses call light consistently and appropriately this shift.  Waits for assistance when needed and does not attempt self transfer.  Able to verbalize needs.  Will continue to monitor.       Problem: Infection  Goal: Will remain free from infection  Outcome: PROGRESSING AS EXPECTED  Note:   Patient remains free from s/s infection; afebrile.  Will continue to monitor.        OCCUPATIONAL THERAPY INITIAL LYMPHEDEMA EVALUATION    Phone: [phone]             Fax: [phone]    Date:  3/27/2018  Initial Evaluation Date: 2018    Patient Name:  Shira Evans    :  1945    Restrictions/Precautions:  fall risk  Diagnosis:  Lymphedema of both lower extremities       Insurance/Certification information:  [facility]  /  [id number]  Referring Physician:  Dr. Kervin Marin of care signed (Y/N):  No  Visit# / total visits: Evaluation    Time In:  0940                Time Out:  8    Reason for Referral:  Pt was referred to [place] [id number] due to  Intractable lymphedema of the bi-lateral lower extremities, unrelieved by elevation.   Chief Complaint: edema bilateral lower extremities  Triggers: activity on her feet    Past Medical History:   Past Medical History             Diagnosis Date    Acute bronchitis 2015    Acute on chronic diastolic (congestive) heart failure 2017    Acute renal failure (HCC)      Acute renal failure with acute cortical necrosis ([address] 2013    Acute-on-chronic kidney injury ([address] 2014    Atrial fibrillation (HCC)      CAD (coronary artery disease)       stent x 3    Cancer ([address]       thyroid and skin    CHF (congestive heart failure) (HCC)       Echo 14 EF 37% stage I diastolic    Depression      Enteritis 2014    H/O echocardiogram 4/10/15     EF 81% stage I diastolic    History of cardiovascular stress test 14     Lexiscan    History of cardiovascular stress test 2017     Lexiscan stress test    History of echocardiogram 2016     EF 70-75%, There is doppler evidence of stage I diastolic dysfunction    Hyperkalemia 2014    Hypertension      Hypothyroidism      Restless leg syndrome      Stroke [facility])      Unspecified cerebral artery occlusion with cerebral infarction      Unstable angina (HCC) 2014         Allergies  No Known fair    Plan   Plan of care initiated. Pt will be seen 2x a week for 6 weeks or 12 sessions if needed. Duration: From: 2018 through [facility]     Plan of Care:   [x]97140-Manual Lymph Drainage and Combined Decongestive Therapy  [x]61725- Skilled Multilayer Short Stretch Compression Bandaging/ Therapeutic Exercise  [x]Skin Care Education [x]HEP including Self MLD Education and/or Self Bandaging  [x]Education for Lymphedema Risks/Precautions     [x] Caregiver Education   []other:? ???? During this plan of care, this patient will demonstrate optimal reduction in girth, joint mobility, muscle performance, ROM and highest level of function in home, community, recreation and work activities    GOALS  Patient Goal: to get swelling down    STG: 3 weeks  1. Patient will demonstrate knowledge and understanding for lymphedema precautions, skin care and self management to decrease progression of lymphedema and risk of infection. 2.  Patient will present with decreased limb volume in the involved extremity from moderate to minimal for improved functional mobility. 3.  Patient will demonstrate compliance with compression therapy for independent management of lymphedema. LT weeks  1. Patient will be independent with self management of lymphedema including understanding garment wear schedule, self compression bandaging, HEP and self care. 2.  Patient will be fitted for appropriate compression garments for long term management of lymphedema. 3.  Patient will present with decreased limb volume in the involved extremity from minimal to trace  for improved functional mobility.          Time In: 940  Time Out: 1040  Timed Code Treatment Minutes: 0      Eval Complexity: low complexity       OT G-codes:  Carrying, Handling, Moving objects   (Current status):  CI   (Discharge Goal):  [facility]          Dear Practitioner,  Thank you for this referral.  If you have questions about this patient please contact me at

## 2020-08-06 ENCOUNTER — APPOINTMENT (OUTPATIENT)
Dept: GENERAL RADIOLOGY | Age: 75
End: 2020-08-06
Payer: MEDICARE

## 2020-08-06 ENCOUNTER — APPOINTMENT (OUTPATIENT)
Dept: CT IMAGING | Age: 75
End: 2020-08-06
Payer: MEDICARE

## 2020-08-06 ENCOUNTER — HOSPITAL ENCOUNTER (EMERGENCY)
Age: 75
Discharge: NURSING, REHAB OR LONG-TERM CARE | End: 2020-08-06
Attending: EMERGENCY MEDICINE
Payer: MEDICARE

## 2020-08-06 VITALS
HEART RATE: 56 BPM | OXYGEN SATURATION: 98 % | DIASTOLIC BLOOD PRESSURE: 70 MMHG | BODY MASS INDEX: 35.68 KG/M2 | RESPIRATION RATE: 16 BRPM | WEIGHT: 222 LBS | SYSTOLIC BLOOD PRESSURE: 180 MMHG | TEMPERATURE: 97.9 F | HEIGHT: 66 IN

## 2020-08-06 LAB
ALBUMIN SERPL-MCNC: 3.5 G/DL (ref 3.5–5.2)
ALP BLD-CCNC: 112 U/L (ref 35–104)
ALT SERPL-CCNC: 9 U/L (ref 0–32)
ANION GAP SERPL CALCULATED.3IONS-SCNC: 10 MMOL/L (ref 7–16)
AST SERPL-CCNC: 9 U/L (ref 0–31)
BACTERIA: ABNORMAL /HPF
BASOPHILS ABSOLUTE: 0.03 E9/L (ref 0–0.2)
BASOPHILS RELATIVE PERCENT: 0.5 % (ref 0–2)
BILIRUB SERPL-MCNC: <0.2 MG/DL (ref 0–1.2)
BILIRUBIN URINE: NEGATIVE
BLOOD, URINE: NEGATIVE
BUN BLDV-MCNC: 17 MG/DL (ref 8–23)
CALCIUM SERPL-MCNC: 8.9 MG/DL (ref 8.6–10.2)
CHLORIDE BLD-SCNC: 113 MMOL/L (ref 98–107)
CLARITY: CLEAR
CO2: 20 MMOL/L (ref 22–29)
COLOR: YELLOW
CREAT SERPL-MCNC: 1.2 MG/DL (ref 0.5–1)
EKG ATRIAL RATE: 57 BPM
EKG P AXIS: 40 DEGREES
EKG P-R INTERVAL: 224 MS
EKG Q-T INTERVAL: 428 MS
EKG QRS DURATION: 88 MS
EKG QTC CALCULATION (BAZETT): 416 MS
EKG T AXIS: 64 DEGREES
EKG VENTRICULAR RATE: 57 BPM
EOSINOPHILS ABSOLUTE: 0.19 E9/L (ref 0.05–0.5)
EOSINOPHILS RELATIVE PERCENT: 3.5 % (ref 0–6)
EPITHELIAL CELLS, UA: ABNORMAL /HPF
GFR AFRICAN AMERICAN: 53
GFR NON-AFRICAN AMERICAN: 44 ML/MIN/1.73
GLUCOSE BLD-MCNC: 86 MG/DL (ref 74–99)
GLUCOSE URINE: NEGATIVE MG/DL
HCT VFR BLD CALC: 41.2 % (ref 34–48)
HEMOGLOBIN: 13.1 G/DL (ref 11.5–15.5)
IMMATURE GRANULOCYTES #: 0.01 E9/L
IMMATURE GRANULOCYTES %: 0.2 % (ref 0–5)
KETONES, URINE: NEGATIVE MG/DL
LACTIC ACID: 1.5 MMOL/L (ref 0.5–2.2)
LEUKOCYTE ESTERASE, URINE: ABNORMAL
LIPASE: 20 U/L (ref 13–60)
LYMPHOCYTES ABSOLUTE: 1.31 E9/L (ref 1.5–4)
LYMPHOCYTES RELATIVE PERCENT: 24 % (ref 20–42)
MCH RBC QN AUTO: 30.6 PG (ref 26–35)
MCHC RBC AUTO-ENTMCNC: 31.8 % (ref 32–34.5)
MCV RBC AUTO: 96.3 FL (ref 80–99.9)
MONOCYTES ABSOLUTE: 0.52 E9/L (ref 0.1–0.95)
MONOCYTES RELATIVE PERCENT: 9.5 % (ref 2–12)
NEUTROPHILS ABSOLUTE: 3.4 E9/L (ref 1.8–7.3)
NEUTROPHILS RELATIVE PERCENT: 62.3 % (ref 43–80)
NITRITE, URINE: POSITIVE
PDW BLD-RTO: 13.1 FL (ref 11.5–15)
PH UA: 6.5 (ref 5–9)
PLATELET # BLD: 227 E9/L (ref 130–450)
PMV BLD AUTO: 9.3 FL (ref 7–12)
POTASSIUM REFLEX MAGNESIUM: 4.2 MMOL/L (ref 3.5–5)
PROTEIN UA: NEGATIVE MG/DL
RBC # BLD: 4.28 E12/L (ref 3.5–5.5)
RBC UA: ABNORMAL /HPF (ref 0–2)
REASON FOR REJECTION: NORMAL
REJECTED TEST: NORMAL
SODIUM BLD-SCNC: 143 MMOL/L (ref 132–146)
SPECIFIC GRAVITY UA: 1.01 (ref 1–1.03)
TOTAL PROTEIN: 6 G/DL (ref 6.4–8.3)
TROPONIN: <0.01 NG/ML (ref 0–0.03)
UROBILINOGEN, URINE: 0.2 E.U./DL
WBC # BLD: 5.5 E9/L (ref 4.5–11.5)
WBC UA: ABNORMAL /HPF (ref 0–5)

## 2020-08-06 PROCEDURE — 83605 ASSAY OF LACTIC ACID: CPT

## 2020-08-06 PROCEDURE — 6360000002 HC RX W HCPCS: Performed by: EMERGENCY MEDICINE

## 2020-08-06 PROCEDURE — 81001 URINALYSIS AUTO W/SCOPE: CPT

## 2020-08-06 PROCEDURE — 96375 TX/PRO/DX INJ NEW DRUG ADDON: CPT

## 2020-08-06 PROCEDURE — 93005 ELECTROCARDIOGRAM TRACING: CPT | Performed by: EMERGENCY MEDICINE

## 2020-08-06 PROCEDURE — 84484 ASSAY OF TROPONIN QUANT: CPT

## 2020-08-06 PROCEDURE — 93010 ELECTROCARDIOGRAM REPORT: CPT | Performed by: INTERNAL MEDICINE

## 2020-08-06 PROCEDURE — 87186 SC STD MICRODIL/AGAR DIL: CPT

## 2020-08-06 PROCEDURE — 71046 X-RAY EXAM CHEST 2 VIEWS: CPT

## 2020-08-06 PROCEDURE — 74176 CT ABD & PELVIS W/O CONTRAST: CPT

## 2020-08-06 PROCEDURE — 99285 EMERGENCY DEPT VISIT HI MDM: CPT

## 2020-08-06 PROCEDURE — 80053 COMPREHEN METABOLIC PANEL: CPT

## 2020-08-06 PROCEDURE — 2580000003 HC RX 258: Performed by: EMERGENCY MEDICINE

## 2020-08-06 PROCEDURE — 87088 URINE BACTERIA CULTURE: CPT

## 2020-08-06 PROCEDURE — 83690 ASSAY OF LIPASE: CPT

## 2020-08-06 PROCEDURE — 85025 COMPLETE CBC W/AUTO DIFF WBC: CPT

## 2020-08-06 PROCEDURE — 36415 COLL VENOUS BLD VENIPUNCTURE: CPT

## 2020-08-06 PROCEDURE — 96365 THER/PROPH/DIAG IV INF INIT: CPT

## 2020-08-06 RX ORDER — ONDANSETRON 2 MG/ML
4 INJECTION INTRAMUSCULAR; INTRAVENOUS ONCE
Status: COMPLETED | OUTPATIENT
Start: 2020-08-06 | End: 2020-08-06

## 2020-08-06 RX ORDER — CEFDINIR 300 MG/1
300 CAPSULE ORAL 2 TIMES DAILY
Qty: 14 CAPSULE | Refills: 0 | Status: SHIPPED | OUTPATIENT
Start: 2020-08-06 | End: 2020-08-13

## 2020-08-06 RX ORDER — HYDRALAZINE HYDROCHLORIDE 20 MG/ML
5 INJECTION INTRAMUSCULAR; INTRAVENOUS ONCE
Status: COMPLETED | OUTPATIENT
Start: 2020-08-06 | End: 2020-08-06

## 2020-08-06 RX ORDER — 0.9 % SODIUM CHLORIDE 0.9 %
500 INTRAVENOUS SOLUTION INTRAVENOUS ONCE
Status: COMPLETED | OUTPATIENT
Start: 2020-08-06 | End: 2020-08-06

## 2020-08-06 RX ORDER — ACETAMINOPHEN 325 MG/1
650 TABLET ORAL EVERY 4 HOURS PRN
COMMUNITY

## 2020-08-06 RX ADMIN — WATER 1 G: 1 INJECTION INTRAMUSCULAR; INTRAVENOUS; SUBCUTANEOUS at 14:12

## 2020-08-06 RX ADMIN — SODIUM CHLORIDE 500 ML: 9 INJECTION, SOLUTION INTRAVENOUS at 12:50

## 2020-08-06 RX ADMIN — ONDANSETRON HYDROCHLORIDE 4 MG: 2 SOLUTION INTRAMUSCULAR; INTRAVENOUS at 12:51

## 2020-08-06 RX ADMIN — HYDRALAZINE HYDROCHLORIDE 5 MG: 20 INJECTION INTRAMUSCULAR; INTRAVENOUS at 16:17

## 2020-08-06 ASSESSMENT — ENCOUNTER SYMPTOMS
BACK PAIN: 0
COUGH: 0
ABDOMINAL PAIN: 1
SHORTNESS OF BREATH: 0

## 2020-08-06 NOTE — ED NOTES
Patient from extended care facility [facility] the last month. Stated she is a rosas of the [facility]. C/o shortness of breath with activity, but unclear if patient is currently ambulating. States prior to SNF was up with walker, but recently ambulation and strength has declined. She is concerned of her abdomin, \"it did not look like this before, since I've been at [facility] it's gotten bigger\". + swelling of lower extremities noted that she denies have changed. Denies chest pain. No noted SOB at rest. RR even and unlabored 98% r/a.                Clem Valderrama RN  08/06/20 1230

## 2020-08-06 NOTE — ED PROVIDER NOTES
This is a 69-year-old female with a past medical history of coronary artery disease who presents to the ED for evaluation of abdominal pain. Patient states over the past several weeks she has been having worsening lower abdominal pain. She states that her abdomen is also more distended than normal as well. She denies any dysuria or hematuria. Denies any radiating pain but does state that she has some associated nausea denies any vomiting. Patient states she frequently has diarrhea but denies any black or bloody stools. Denies any chest pain or shortness of breath. She states that she was diagnosed with a urinary tract infection several months ago and was placed on antibiotics with resolution of her symptoms. She denies any vaginal discharge or mitigating or worsening factors. The history is provided by the patient. No  was used. Review of Systems   Constitutional: Positive for appetite change. Negative for fever. HENT: Negative for congestion. Eyes: Negative for visual disturbance. Respiratory: Negative for cough and shortness of breath. Cardiovascular: Negative for chest pain. Gastrointestinal: Positive for abdominal pain. Endocrine: Negative for polyuria. Genitourinary: Negative for dysuria. Musculoskeletal: Negative for back pain. Skin: Negative for rash. Allergic/Immunologic: Negative for immunocompromised state. Neurological: Negative for headaches. Hematological: Does not bruise/bleed easily. Psychiatric/Behavioral: Negative for confusion. Physical Exam  Vitals signs and nursing note reviewed. Constitutional:       General: She is not in acute distress. Appearance: She is well-developed. She is obese. HENT:      Head: Normocephalic and atraumatic. Mouth/Throat:      Mouth: Mucous membranes are dry. Eyes:      Extraocular Movements: Extraocular movements intact. Pupils: Pupils are equal, round, and reactive to light. Neck:      Musculoskeletal: Normal range of motion and neck supple. Vascular: No JVD. Cardiovascular:      Rate and Rhythm: Normal rate and regular rhythm. Heart sounds: No murmur. Pulmonary:      Effort: Pulmonary effort is normal.      Breath sounds: No wheezing, rhonchi or rales. Chest:      Chest wall: No tenderness. Abdominal:      Palpations: Abdomen is soft. Tenderness: There is no abdominal tenderness. There is no right CVA tenderness, left CVA tenderness, guarding or rebound. Hernia: No hernia is present. Comments: Slightly distended, mild lower abdominal tenderness to palpation   Musculoskeletal:      Right lower leg: No edema. Left lower leg: No edema. Comments: Chronic lymphedema bilaterally   Skin:     General: Skin is warm and dry. Neurological:      General: No focal deficit present. Mental Status: She is alert and oriented to person, place, and time. Cranial Nerves: No cranial nerve deficit. Psychiatric:         Mood and Affect: Mood normal.         Behavior: Behavior normal.          Procedures     MDM  Number of Diagnoses or Management Options  Abdominal pain, unspecified abdominal location:   Acute cystitis with hematuria:   Elevated blood pressure reading:   Diagnosis management comments: This is a 39-year-old female with a past medical history of hypertension CVA in the past who presented to the ED for evaluation of lower abdominal pain that has been ongoing for the past several weeks. Patient states she does have some diarrhea and some nausea but denies any chest pain shortness of breath or fevers. Patient had a differential which included diverticulitis, pyelonephritis and cystitis to mention a few of the many. Patient was found to have signs suggestive of cystitis treated 1 dose Rocephin and given Omnicef for home. Patient had no acute pathology identified on her CT of her abdomen.   Of note the patient did have slightly elevated blood pressure which she was written for hydralazine however the patient ripped out her IV and states she did not want it. Patient was of sound mind had capacity make decisions and given the chronicity of this I defer the management ultimately to her PCP as she had no signs of endorgan damage. Patient was given strict return precautions. EKG: This EKG is signed and interpreted by me. Rate: 57  Rhythm: Sinus  Interpretation: sinus bradycardia with 1st  Comparison: was normal            --------------------------------------------- PAST HISTORY ---------------------------------------------  Past Medical History:  has a past medical history of Acute bronchitis, Acute on chronic diastolic (congestive) heart failure (HCC), Acute renal failure (HCC), Acute renal failure with acute cortical necrosis (HCC), Acute-on-chronic kidney injury ([address] Arthritis, Atrial fibrillation ([address] CAD (coronary artery disease), Cancer ([address] CHF (congestive heart failure) ([address] Depression, Enteritis, H/O echocardiogram, History of cardiovascular stress test, History of cardiovascular stress test, History of echocardiogram, History of echocardiogram, Hyperkalemia, Hypertension, Hypothyroidism, Restless leg syndrome, Stroke ([address] Unspecified cerebral artery occlusion with cerebral infarction, and Unstable angina ([address] Past Surgical History:  has a past surgical history that includes Cardiac surgery; Hysterectomy; joint replacement; hiatal hernia repair; Gallbladder surgery; Thyroid surgery; Thyroidectomy; Cholecystectomy; hernia repair; Echo Complete (1/20/2014 EF62%); Diagnostic Cardiac Cath Lab Procedure (09/10/2009); Diagnostic Cardiac Cath Lab Procedure (10/11/2007); Diagnostic Cardiac Cath Lab Procedure (09/27/2006); and Diagnostic Cardiac Cath Lab Procedure (05/23/2003). Social History:  reports that she has never smoked.  She has never used smokeless tobacco. She reports that she does not drink alcohol or use drugs. Family History: family history includes Heart Disease (age of onset: 46) in her father; Heart Failure in her brother; Stroke in her mother. The patients home medications have been reviewed. Allergies: Patient has no known allergies.     -------------------------------------------------- RESULTS -------------------------------------------------  Labs:  Results for orders placed or performed during the hospital encounter of 08/06/20   CBC Auto Differential   Result Value Ref Range    WBC 5.5 4.5 - 11.5 E9/L    RBC 4.28 3.50 - 5.50 E12/L    Hemoglobin 13.1 11.5 - 15.5 g/dL    Hematocrit 41.2 34.0 - 48.0 %    MCV 96.3 80.0 - 99.9 fL    MCH 30.6 26.0 - 35.0 pg    MCHC 31.8 (L) 32.0 - 34.5 %    RDW 13.1 11.5 - 15.0 fL    Platelets 418 015 - 350 E9/L    MPV 9.3 7.0 - 12.0 fL    Neutrophils % 62.3 43.0 - 80.0 %    Immature Granulocytes % 0.2 0.0 - 5.0 %    Lymphocytes % 24.0 20.0 - 42.0 %    Monocytes % 9.5 2.0 - 12.0 %    Eosinophils % 3.5 0.0 - 6.0 %    Basophils % 0.5 0.0 - 2.0 %    Neutrophils Absolute 3.40 1.80 - 7.30 E9/L    Immature Granulocytes # 0.01 E9/L    Lymphocytes Absolute 1.31 (L) 1.50 - 4.00 E9/L    Monocytes Absolute 0.52 0.10 - 0.95 E9/L    Eosinophils Absolute 0.19 0.05 - 0.50 E9/L    Basophils Absolute 0.03 0.00 - 0.20 E9/L   Lipase   Result Value Ref Range    Lipase 20 13 - 60 U/L   Lactic Acid, Plasma   Result Value Ref Range    Lactic Acid 1.5 0.5 - 2.2 mmol/L   Urinalysis   Result Value Ref Range    Color, UA Yellow Straw/Yellow    Clarity, UA Clear Clear    Glucose, Ur Negative Negative mg/dL    Bilirubin Urine Negative Negative    Ketones, Urine Negative Negative mg/dL    Specific Gravity, UA 1.015 1.005 - 1.030    Blood, Urine Negative Negative    pH, UA 6.5 5.0 - 9.0    Protein, UA Negative Negative mg/dL    Urobilinogen, Urine 0.2 <2.0 E.U./dL    Nitrite, Urine POSITIVE (A) Negative    Leukocyte Esterase, Urine TRACE (A) Negative   Microscopic Urinalysis   Result Value Ref Range    WBC, UA 1-3 0 - 5 /HPF    RBC, UA 1-3 0 - 2 /HPF    Epithelial Cells, UA RARE /HPF    Bacteria, UA MODERATE (A) None Seen /HPF   SPECIMEN REJECTION   Result Value Ref Range    Rejected Test CMPX TROP     Reason for Rejection see below    Troponin   Result Value Ref Range    Troponin <0.01 0.00 - 0.03 ng/mL   Comprehensive Metabolic Panel w/ Reflex to MG   Result Value Ref Range    Sodium 143 132 - 146 mmol/L    Potassium reflex Magnesium 4.2 3.5 - 5.0 mmol/L    Chloride 113 (H) 98 - 107 mmol/L    CO2 20 (L) 22 - 29 mmol/L    Anion Gap 10 7 - 16 mmol/L    Glucose 86 74 - 99 mg/dL    BUN 17 8 - 23 mg/dL    CREATININE 1.2 (H) 0.5 - 1.0 mg/dL    GFR Non-African American 44 >=60 mL/min/1.73    GFR African American 53     Calcium 8.9 8.6 - 10.2 mg/dL    Total Protein 6.0 (L) 6.4 - 8.3 g/dL    Alb 3.5 3.5 - 5.2 g/dL    Total Bilirubin <0.2 0.0 - 1.2 mg/dL    Alkaline Phosphatase 112 (H) 35 - 104 U/L    ALT 9 0 - 32 U/L    AST 9 0 - 31 U/L   EKG 12 Lead   Result Value Ref Range    Ventricular Rate 57 BPM    Atrial Rate 57 BPM    P-R Interval 224 ms    QRS Duration 88 ms    Q-T Interval 428 ms    QTc Calculation (Bazett) 416 ms    P Axis 40 degrees    T Axis 64 degrees       Radiology:  XR CHEST (2 VW)   Final Result   Stable cardiomegaly. Stable interstitial edema. No acute findings. CT ABDOMEN PELVIS WO CONTRAST Additional Contrast? None   Final Result   No acute intra-abdominal or intrapelvic findings. ------------------------- NURSING NOTES AND VITALS REVIEWED ---------------------------  Date / Time Roomed:  8/6/2020 12:11 PM  ED Bed Assignment:  20/20    The nursing notes within the ED encounter and vital signs as below have been reviewed.    BP (!) 205/96   Pulse 56   Temp 98 °F (36.7 °C) (Oral)   Resp 16   Ht 5' 6\" (1.676 m)   Wt 222 lb (100.7 kg)   SpO2 97%   BMI 35.83 kg/m²   Oxygen Saturation Interpretation: Normal      ------------------------------------------ PROGRESS NOTES ------------------------------------------  4:22 PM EDT  I have spoken with the patient and discussed todays results, in addition to providing specific details for the plan of care and counseling regarding the diagnosis and prognosis. Their questions are answered at this time and they are agreeable with the plan. I discussed at length with them reasons for immediate return here for re evaluation. They will followup with their PCP.      --------------------------------- ADDITIONAL PROVIDER NOTES ---------------------------------  At this time the patient is without objective evidence of an acute process requiring hospitalization or inpatient management. They have remained hemodynamically stable throughout their entire ED visit and are stable for discharge with outpatient follow-up. The plan has been discussed in detail and they are aware of the specific conditions for emergent return, as well as the importance of follow-up. New Prescriptions    CEFDINIR (OMNICEF) 300 MG CAPSULE    Take 1 capsule by mouth 2 times daily for 7 days       Diagnosis:  1. Abdominal pain, unspecified abdominal location    2. Acute cystitis with hematuria    3. Elevated blood pressure reading        Disposition:  Patient's disposition: Discharge to home  Patient's condition is stable.      Williams Benton DO  08/06/20 1806

## 2020-08-06 NOTE — ED NOTES
Patient refusing for RN to take anymore blood pressure. Dr. Kimberly Huynh aware and ok for patient to be discharged back to [facility].       Yeni Simpson RN  08/06/20 6969

## 2020-08-08 LAB
ORGANISM: ABNORMAL
URINE CULTURE, ROUTINE: ABNORMAL

## 2021-01-23 ENCOUNTER — HOSPITAL ENCOUNTER (EMERGENCY)
Age: 76
Discharge: HOME | End: 2021-01-24
Attending: EMERGENCY MEDICINE
Payer: MEDICARE

## 2021-01-23 ENCOUNTER — APPOINTMENT (OUTPATIENT)
Dept: ULTRASOUND IMAGING | Age: 76
End: 2021-01-23
Payer: MEDICARE

## 2021-01-23 VITALS
TEMPERATURE: 97.6 F | HEART RATE: 55 BPM | RESPIRATION RATE: 16 BRPM | SYSTOLIC BLOOD PRESSURE: 187 MMHG | OXYGEN SATURATION: 96 % | DIASTOLIC BLOOD PRESSURE: 84 MMHG

## 2021-01-23 DIAGNOSIS — M79.669 CALF TENDERNESS: Primary | ICD-10-CM

## 2021-01-23 LAB
ALBUMIN SERPL-MCNC: 3.7 G/DL (ref 3.5–5.2)
ALP BLD-CCNC: 147 U/L (ref 35–104)
ALT SERPL-CCNC: 12 U/L (ref 0–32)
ANION GAP SERPL CALCULATED.3IONS-SCNC: 7 MMOL/L (ref 7–16)
AST SERPL-CCNC: 13 U/L (ref 0–31)
BASOPHILS ABSOLUTE: 0.05 E9/L (ref 0–0.2)
BASOPHILS RELATIVE PERCENT: 0.5 % (ref 0–2)
BILIRUB SERPL-MCNC: 0.3 MG/DL (ref 0–1.2)
BUN BLDV-MCNC: 14 MG/DL (ref 8–23)
CALCIUM SERPL-MCNC: 9.7 MG/DL (ref 8.6–10.2)
CHLORIDE BLD-SCNC: 107 MMOL/L (ref 98–107)
CO2: 25 MMOL/L (ref 22–29)
CREAT SERPL-MCNC: 1.1 MG/DL (ref 0.5–1)
EOSINOPHILS ABSOLUTE: 0.25 E9/L (ref 0.05–0.5)
EOSINOPHILS RELATIVE PERCENT: 2.6 % (ref 0–6)
GFR AFRICAN AMERICAN: 58
GFR NON-AFRICAN AMERICAN: 48 ML/MIN/1.73
GLUCOSE BLD-MCNC: 94 MG/DL (ref 74–99)
HCT VFR BLD CALC: 41.1 % (ref 34–48)
HEMOGLOBIN: 13.2 G/DL (ref 11.5–15.5)
IMMATURE GRANULOCYTES #: 0.05 E9/L
IMMATURE GRANULOCYTES %: 0.5 % (ref 0–5)
INR BLD: 1.4
LYMPHOCYTES ABSOLUTE: 2.02 E9/L (ref 1.5–4)
LYMPHOCYTES RELATIVE PERCENT: 21.1 % (ref 20–42)
MCH RBC QN AUTO: 31.1 PG (ref 26–35)
MCHC RBC AUTO-ENTMCNC: 32.1 % (ref 32–34.5)
MCV RBC AUTO: 96.7 FL (ref 80–99.9)
MONOCYTES ABSOLUTE: 0.63 E9/L (ref 0.1–0.95)
MONOCYTES RELATIVE PERCENT: 6.6 % (ref 2–12)
NEUTROPHILS ABSOLUTE: 6.58 E9/L (ref 1.8–7.3)
NEUTROPHILS RELATIVE PERCENT: 68.7 % (ref 43–80)
PDW BLD-RTO: 12.9 FL (ref 11.5–15)
PLATELET # BLD: 319 E9/L (ref 130–450)
PMV BLD AUTO: 9.1 FL (ref 7–12)
POTASSIUM REFLEX MAGNESIUM: 4.4 MMOL/L (ref 3.5–5)
PROTHROMBIN TIME: 16 SEC (ref 9.3–12.4)
RBC # BLD: 4.25 E12/L (ref 3.5–5.5)
SODIUM BLD-SCNC: 139 MMOL/L (ref 132–146)
TOTAL PROTEIN: 6.9 G/DL (ref 6.4–8.3)
WBC # BLD: 9.6 E9/L (ref 4.5–11.5)

## 2021-01-23 PROCEDURE — 36415 COLL VENOUS BLD VENIPUNCTURE: CPT

## 2021-01-23 PROCEDURE — 99283 EMERGENCY DEPT VISIT LOW MDM: CPT

## 2021-01-23 PROCEDURE — 85025 COMPLETE CBC W/AUTO DIFF WBC: CPT

## 2021-01-23 PROCEDURE — 85610 PROTHROMBIN TIME: CPT

## 2021-01-23 PROCEDURE — 80053 COMPREHEN METABOLIC PANEL: CPT

## 2021-01-23 PROCEDURE — 93970 EXTREMITY STUDY: CPT

## 2021-01-24 ASSESSMENT — ENCOUNTER SYMPTOMS
COUGH: 0
CONSTIPATION: 0
ABDOMINAL DISTENTION: 0
NAUSEA: 0
SHORTNESS OF BREATH: 0
BACK PAIN: 0
COLOR CHANGE: 0
VOICE CHANGE: 0
ABDOMINAL PAIN: 0
DIARRHEA: 0
VOMITING: 0

## 2021-01-24 NOTE — ED PROVIDER NOTES
[address]      Pt Name: William Jean  MRN: [id number]  [place] 1945  Date of evaluation: 1/23/2021      CHIEF COMPLAINT       Chief Complaint   Patient presents with    Leg Problem     Sent in D/T increasing bruising to LLE. NH wants to R/O DVT. Pt has (Chronic) BLE edema. Bilat pedal pulses present. HPI  Willaim Jean is a 67 y.o. 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 Patient states that for the last week she has had progressively worsening left leg swelling. States that now she has a bruise on the back of her knee that is extending to the medial aspect of her thigh new. No alleviating or exacerbating factors. She does not take any medications or measures to alleviate her symptoms. States that she did not have any trauma to the area. She is recommended that her primary care physician to come in for evaluation for suspected DVT. Denies any fevers, chills, nausea, vomiting, chest pain, shortness of breath, orthopnea, proximal nocturnal dyspnea, or sleeping in a reclined position abdominal pain, flank pain, dysuria, hematuria, diarrhea, constipation, new rashes or sores. Except as noted above the remainder of the review of systems was reviewed and negative. Review of Systems   Constitutional: Negative for activity change, appetite change, diaphoresis, fatigue, fever and unexpected weight change. HENT: Negative for congestion and voice change. Eyes: Negative for visual disturbance. Respiratory: Negative for cough and shortness of breath. Cardiovascular: Positive for leg swelling. Negative for chest pain and palpitations. Gastrointestinal: Negative for abdominal distention, abdominal pain, constipation, diarrhea, nausea and vomiting. Endocrine: Negative for polyphagia and polyuria.    Genitourinary: Negative for dysuria and hematuria. Musculoskeletal: Negative for arthralgias and back pain. Skin: Negative for color change, pallor, rash and wound. Neurological: Negative for dizziness. Hematological: Negative for adenopathy. Does not bruise/bleed easily. Psychiatric/Behavioral: Negative for agitation. Physical Exam  Vitals signs and nursing note reviewed. Constitutional:       General: She is not in acute distress. Appearance: Normal appearance. She is well-developed. She is obese. She is not ill-appearing or diaphoretic. HENT:      Head: Normocephalic and atraumatic. Eyes:      Pupils: Pupils are equal, round, and reactive to light. Neck:      Musculoskeletal: Normal range of motion. Cardiovascular:      Rate and Rhythm: Normal rate and regular rhythm. Pulses: Normal pulses. Heart sounds: Normal heart sounds. Pulmonary:      Effort: Pulmonary effort is normal. No respiratory distress. Breath sounds: Normal breath sounds. No wheezing or rales. Abdominal:      General: Bowel sounds are normal.      Palpations: Abdomen is soft. Tenderness: There is no abdominal tenderness. There is no guarding or rebound. Musculoskeletal: Normal range of motion. General: No swelling, tenderness, deformity or signs of injury. Right lower leg: Edema present. Left lower leg: Edema present. Comments: Bilateral leg edema. 1+ pitting. Patient has a left ecchymotic region behind her left knee that extends medially to her thigh. It is nontender to touch. Left and right calves are both mildly tender to touch. No unilateral swelling. Dorsal pedal pulses 1+ equal bilateral.  Capillary refill less than 2 seconds. Skin:     General: Skin is warm and dry. Neurological:      General: No focal deficit present. Mental Status: She is alert and oriented to person, place, and time. Cranial Nerves: No cranial nerve deficit.       Sensory: No sensory participated in the history, exam, medical decision making, and procedures and agree with all pertinent clinical information. I have reviewed my findings and recommendations with Zulma Griffin and members of family present at the time of disposition. My findings/plan: Patient is a 71-year-old female who arrives via EMS from the nursing home with a chief complaint of increasing lower extremity swelling. Patient is a known history of chronic bilateral lower extremity swelling but has had more swelling of the left lower extremity. She is on Xarelto and does take it as prescribed at the nursing home. The patient developed some bruising behind her left knee, patient denies any recent trauma or falls. She was brought in to rule out DVT. Patient denies any lightheadedness, dizziness, chest pain, shortness of breath, abdominal pain, nausea, vomiting. No numbness or tingling the extremities. On examination the patient is resting comfortably in bed. Clear breath sounds in all lung fields. No acute respiratory stress. Pulse ox 96% on room air. Regular rate and rhythm of the heart. No abdominal tenderness palpation. Bilateral lower extremity edema present, more so of the left lower extremity. There is ecchymosis behind the left knee. No obvious deformity or open wounds. No sensory deficit the lower extremities. Patient is otherwise at her baseline mental status and is following all commands appropriately.   Trista Go DO      [MS]      ED Course User Index  [MS] Edilia Huang DO             --------------------------------------------- PAST HISTORY ---------------------------------------------  Past Medical History:  has a past medical history of Acute bronchitis, Acute on chronic diastolic (congestive) heart failure (HCC), Acute renal failure ([address] Acute renal failure with acute cortical necrosis ([facility] Acute-on-chronic kidney injury ([address] Arthritis, Atrial fibrillation ([facility] CAD discussed at length with them reasons for immediate return here for re evaluation. They will followup with their PCP by calling their office tomorrow. --------------------------------- ADDITIONAL PROVIDER NOTES ---------------------------------  At this time the patient is without objective evidence of an acute process requiring hospitalization or inpatient management. They have remained hemodynamically stable throughout their entire ED visit and are stable for discharge with outpatient follow-up. The plan has been discussed in detail and they are aware of the specific conditions for emergent return, as well as the importance of follow-up. Discharge Medication List as of 1/24/2021  2:22 AM          Diagnosis:  1. Calf tenderness Stable       Disposition:  Patient's disposition: Discharge to home  Patient's condition is stable.       Rommel Canchola MD  Resident  01/24/21 1190

## 2021-01-24 NOTE — ED NOTES
Attempted to call [facility] to [facility] at this time. No nurse available to take report. Pt to be transported back to [facility] in stable condition.       Kayley Quiroz RN  01/24/21 9868

## 2021-02-17 ENCOUNTER — APPOINTMENT (OUTPATIENT)
Dept: GENERAL RADIOLOGY | Age: 76
End: 2021-02-17
Payer: MEDICARE

## 2021-02-17 ENCOUNTER — HOSPITAL ENCOUNTER (EMERGENCY)
Age: 76
Discharge: HOME | End: 2021-02-17
Attending: EMERGENCY MEDICINE
Payer: MEDICARE

## 2021-02-17 VITALS
RESPIRATION RATE: 20 BRPM | BODY MASS INDEX: 33.9 KG/M2 | OXYGEN SATURATION: 96 % | SYSTOLIC BLOOD PRESSURE: 127 MMHG | WEIGHT: 216 LBS | TEMPERATURE: 97.2 F | HEART RATE: 60 BPM | HEIGHT: 67 IN | DIASTOLIC BLOOD PRESSURE: 73 MMHG

## 2021-02-17 DIAGNOSIS — M79.605 LEFT LEG PAIN: Primary | ICD-10-CM

## 2021-02-17 PROCEDURE — 99284 EMERGENCY DEPT VISIT MOD MDM: CPT

## 2021-02-17 PROCEDURE — 73610 X-RAY EXAM OF ANKLE: CPT

## 2021-02-17 PROCEDURE — 73590 X-RAY EXAM OF LOWER LEG: CPT

## 2021-02-17 PROCEDURE — 6370000000 HC RX 637 (ALT 250 FOR IP): Performed by: STUDENT IN AN ORGANIZED HEALTH CARE EDUCATION/TRAINING PROGRAM

## 2021-02-17 PROCEDURE — 73560 X-RAY EXAM OF KNEE 1 OR 2: CPT

## 2021-02-17 RX ORDER — METOPROLOL SUCCINATE 50 MG/1
50 TABLET, EXTENDED RELEASE ORAL DAILY
Status: ON HOLD | COMMUNITY
End: 2021-09-09 | Stop reason: HOSPADM

## 2021-02-17 RX ORDER — DIVALPROEX SODIUM 125 MG/1
125 CAPSULE, COATED PELLETS ORAL 2 TIMES DAILY
COMMUNITY

## 2021-02-17 RX ORDER — DULOXETIN HYDROCHLORIDE 30 MG/1
30 CAPSULE, DELAYED RELEASE ORAL DAILY
Status: ON HOLD | COMMUNITY
End: 2021-09-06

## 2021-02-17 RX ORDER — RIVASTIGMINE TARTRATE 3 MG/1
3 CAPSULE ORAL 2 TIMES DAILY
COMMUNITY

## 2021-02-17 RX ORDER — ROPINIROLE 1 MG/1
1 TABLET, FILM COATED ORAL 3 TIMES DAILY
COMMUNITY

## 2021-02-17 RX ORDER — DULOXETIN HYDROCHLORIDE 60 MG/1
60 CAPSULE, DELAYED RELEASE ORAL NIGHTLY
COMMUNITY

## 2021-02-17 RX ORDER — TORSEMIDE 20 MG/1
40 TABLET ORAL DAILY
COMMUNITY

## 2021-02-17 RX ORDER — HYDROCODONE BITARTRATE AND ACETAMINOPHEN 5; 325 MG/1; MG/1
1 TABLET ORAL ONCE
Status: COMPLETED | OUTPATIENT
Start: 2021-02-17 | End: 2021-02-17

## 2021-02-17 RX ORDER — GABAPENTIN 300 MG/1
300 CAPSULE ORAL NIGHTLY
COMMUNITY

## 2021-02-17 RX ORDER — HYDROCODONE BITARTRATE AND ACETAMINOPHEN 5; 325 MG/1; MG/1
1 TABLET ORAL EVERY 6 HOURS PRN
Qty: 4 TABLET | Refills: 0 | Status: SHIPPED | OUTPATIENT
Start: 2021-02-17 | End: 2021-02-18

## 2021-02-17 RX ORDER — LEVOTHYROXINE SODIUM 0.07 MG/1
75 TABLET ORAL DAILY
Status: ON HOLD | COMMUNITY
End: 2021-09-06

## 2021-02-17 RX ORDER — MAG HYDROX/ALUMINUM HYD/SIMETH 400-400-40
30 SUSPENSION, ORAL (FINAL DOSE FORM) ORAL DAILY PRN
Status: ON HOLD | COMMUNITY
End: 2021-09-06

## 2021-02-17 RX ADMIN — HYDROCODONE BITARTRATE AND ACETAMINOPHEN 1 TABLET: 5; 325 TABLET ORAL at 08:09

## 2021-02-17 ASSESSMENT — ENCOUNTER SYMPTOMS
VOMITING: 0
SORE THROAT: 0
COUGH: 0
NAUSEA: 0
EYE PAIN: 0
EYE REDNESS: 0
BACK PAIN: 0
ABDOMINAL DISTENTION: 0
EYE DISCHARGE: 0
WHEEZING: 0
SINUS PRESSURE: 0
DIARRHEA: 0
SHORTNESS OF BREATH: 0

## 2021-02-17 ASSESSMENT — PAIN DESCRIPTION - LOCATION: LOCATION: LEG

## 2021-02-17 ASSESSMENT — PAIN DESCRIPTION - FREQUENCY: FREQUENCY: CONTINUOUS

## 2021-02-17 ASSESSMENT — PAIN DESCRIPTION - PAIN TYPE: TYPE: ACUTE PAIN

## 2021-02-17 ASSESSMENT — PAIN SCALES - GENERAL: PAINLEVEL_OUTOF10: 10

## 2021-02-17 NOTE — ED NOTES
[facility] and the crew that was going to  this patient was diverted to another call and won't be here for another 70 minutes     Oj Stephenson RN  02/17/21 3705

## 2021-02-17 NOTE — ED NOTES
Lamar Chilel, guardian, whom told us she has never been asked to provide the pt with a ride, and if she has to pick her up it will be noon or later.  She instructed us not to contact any other family member     Kaitlynn Rodriguez RN  02/17/21 6412

## 2021-02-17 NOTE — ED PROVIDER NOTES
Patient presents after a fall from bed. Patient says her bed is approximately 2 feet high. She rolled out of bed causing the fall. She currently is expressing pain in her left leg from her knee down. She rates her pain as a 15 out of 10. She denies hitting her head and any loss of consciousness. She is on Xarelto. Palpation does make her pain worse. She denies any neck or back pain. Pain has been persistent. The history is provided by the patient. No  was used. Fall  The accident occurred less than 1 hour ago. The fall occurred from a bed. She fell from a height of 1 to 2 ft. There was no blood loss. The point of impact was the left knee. The pain is present in the left knee. The pain is at a severity of 10/10. The pain is severe. She was not ambulatory at the scene. There was no entrapment after the fall. There was no drug use involved in the accident. There was no alcohol use involved in the accident. Pertinent negatives include no fever, no numbness, no nausea, no vomiting and no headaches. Review of Systems   Constitutional: Negative for chills and fever. HENT: Negative for ear pain, sinus pressure and sore throat. Eyes: Negative for pain, discharge and redness. Respiratory: Negative for cough, shortness of breath and wheezing. Cardiovascular: Negative for chest pain. Gastrointestinal: Negative for abdominal distention, diarrhea, nausea and vomiting. Genitourinary: Negative for dysuria and frequency. Musculoskeletal: Negative for arthralgias, back pain, neck pain and neck stiffness. Skin: Negative for rash and wound. Neurological: Negative for weakness, light-headedness, numbness and headaches. Hematological: Negative for adenopathy. All other systems reviewed and are negative. Physical Exam  Vitals signs and nursing note reviewed. Constitutional:       General: She is not in acute distress. Appearance: She is well-developed.  She is obese. She is not ill-appearing. HENT:      Head: Normocephalic and atraumatic. Eyes:      Conjunctiva/sclera: Conjunctivae normal.   Neck:      Musculoskeletal: Normal range of motion and neck supple. No neck rigidity or muscular tenderness. Cardiovascular:      Rate and Rhythm: Normal rate and regular rhythm. Heart sounds: Normal heart sounds. No murmur. Pulmonary:      Effort: Pulmonary effort is normal. No respiratory distress. Breath sounds: Normal breath sounds. No wheezing or rales. Abdominal:      General: Bowel sounds are normal.      Palpations: Abdomen is soft. Tenderness: There is no abdominal tenderness. There is no guarding or rebound. Musculoskeletal:         General: Tenderness (Left Knee) and signs of injury present. Skin:     General: Skin is warm and dry. Neurological:      Mental Status: She is alert and oriented to person, place, and time. Cranial Nerves: No cranial nerve deficit. Coordination: Coordination normal.          Procedures     MDM  Number of Diagnoses or Management Options  Left leg pain  Diagnosis management comments: Differential includes acute fracture versus contusion. Patient discharged with Tamie Dukes for pain instructed to follow-up with her PCP for further evaluation and treatment. Patient expresses understanding and is agreeable. Patient discharged. Amount and/or Complexity of Data Reviewed  Tests in the radiology section of CPT®: reviewed         ED Course as of Feb 17 0946   Wed Feb 17, 2021   0918 Patient states her pain is not improved    [BB]   1064 Patient was able to be ambulated and put weight on that left lower extremity. X-rays did not reveal any osseous abnormalities. Patient is still complaining of pain but it is not restricting her daily activities of life.     [BB]      ED Course User Index  [BB] Adelaida Og DO        ED Course as of Feb 17 0946   Wed Feb 17, 2021   9432 Patient states her pain is not improved been reviewed. Allergies: Patient has no known allergies. -------------------------------------------------- RESULTS -------------------------------------------------  Labs:  No results found for this visit on 02/17/21. Radiology:  XR ANKLE LEFT (MIN 3 VIEWS)   Final Result   No acute fractures or dislocations. Asymmetry of the tibiotalar joint laterally may be postoperative. Diffuse   soft tissue edema, nonspecific. XR TIBIA FIBULA LEFT (2 VIEWS)   Final Result   No acute osseous abnormality. XR KNEE LEFT (1-2 VIEWS)   Final Result   No acute fractures or dislocations of left knee. Findings suggest knee joint effusion. Evaluation however is limited due to   oblique projection on lateral view. Severe osteoarthrosis of medial compartment.          ------------------------- NURSING NOTES AND VITALS REVIEWED ---------------------------  Date / Time Roomed:  2/17/2021  7:51 AM  ED Bed Assignment:  07/07    The nursing notes within the ED encounter and vital signs as below have been reviewed. BP (!) 165/85   Pulse 56   Temp 97.2 °F (36.2 °C) (Infrared)   Resp 18   Ht 5' 6.5\" (1.689 m)   Wt 216 lb (98 kg)   SpO2 98%   BMI 34.34 kg/m²   Oxygen Saturation Interpretation: Normal      ------------------------------------------ PROGRESS NOTES ------------------------------------------  9:46 AM EST  I have spoken with the patient and discussed todays results, in addition to providing specific details for the plan of care and counseling regarding the diagnosis and prognosis. Their questions are answered at this time and they are agreeable with the plan. I discussed at length with them reasons for immediate return here for re evaluation. They will followup with their primary care physician by calling their office tomorrow.       --------------------------------- ADDITIONAL PROVIDER NOTES ---------------------------------  At this time the patient is without objective evidence of an acute process requiring hospitalization or inpatient management. They have remained hemodynamically stable throughout their entire ED visit and are stable for discharge with outpatient follow-up. The plan has been discussed in detail and they are aware of the specific conditions for emergent return, as well as the importance of follow-up. New Prescriptions    HYDROCODONE-ACETAMINOPHEN (NORCO) 5-325 MG PER TABLET    Take 1 tablet by mouth every 6 hours as needed for Pain for up to 1 day. Intended supply: 3 days. Take lowest dose possible to manage pain       Diagnosis:  1. Left leg pain        Disposition:  Patient's disposition: Discharge to nursing home  Patient's condition is stable. Patient was seen and evaluated by both myself and Marychuy Solomon DO.    ATTENDING PROVIDER ATTESTATION:     Angelo Burton presented to the emergency department for evaluation of Leg Pain (left leg pain from knee to ankle after falling OOB today. denies any other injury)    I have reviewed and discussed the case, including pertinent history (medical, surgical, family and social) and exam findings with the Resident and the Nurse assigned to Angelo Burton. I have personally performed and/or participated in the history, exam, medical decision making, and procedures and agree with all pertinent clinical information. Patient resting in bed in no distress. On exam she does have tenderness to palpation below the knee down to the ankle. No appreciable bony crepitance. No overlying erythema or ecchymosis noted. No increase in warmth. No increase in joint laxity of the left knee compared to the right knee. Sensation in left lower extremity is grossly intact. I have reviewed my findings and recommendations with Medardo Griffin and members of family present at the time of disposition.         MDM: Supportive care, will obtain appropriate imaging to assess patient's Leg Pain (left leg pain from knee to ankle after

## 2021-02-17 NOTE — ED NOTES
Bed: 07  Expected date:   Expected time:   Means of arrival:   Comments:  kalee Giles Said, RN  02/17/21 6426

## 2021-09-06 ENCOUNTER — APPOINTMENT (OUTPATIENT)
Dept: CT IMAGING | Age: 76
DRG: 092 | End: 2021-09-06
Payer: MEDICARE

## 2021-09-06 ENCOUNTER — APPOINTMENT (OUTPATIENT)
Dept: GENERAL RADIOLOGY | Age: 76
DRG: 092 | End: 2021-09-06
Payer: MEDICARE

## 2021-09-06 ENCOUNTER — HOSPITAL ENCOUNTER (INPATIENT)
Age: 76
LOS: 4 days | Discharge: OTHER INSTITUTION | DRG: 092 | End: 2021-09-10
Attending: EMERGENCY MEDICINE | Admitting: INTERNAL MEDICINE
Payer: MEDICARE

## 2021-09-06 DIAGNOSIS — R27.0 ATAXIA: Primary | ICD-10-CM

## 2021-09-06 DIAGNOSIS — R26.2 AMBULATORY DYSFUNCTION: ICD-10-CM

## 2021-09-06 DIAGNOSIS — M25.562 LEFT KNEE PAIN, UNSPECIFIED CHRONICITY: ICD-10-CM

## 2021-09-06 LAB
ALBUMIN SERPL-MCNC: 4.1 G/DL (ref 3.5–5.2)
ALP BLD-CCNC: 104 U/L (ref 35–104)
ALT SERPL-CCNC: 10 U/L (ref 0–32)
ANION GAP SERPL CALCULATED.3IONS-SCNC: 9 MMOL/L (ref 7–16)
AST SERPL-CCNC: 13 U/L (ref 0–31)
BACTERIA: ABNORMAL /HPF
BASOPHILS ABSOLUTE: 0.04 E9/L (ref 0–0.2)
BASOPHILS RELATIVE PERCENT: 0.7 % (ref 0–2)
BILIRUB SERPL-MCNC: 0.3 MG/DL (ref 0–1.2)
BILIRUBIN URINE: NEGATIVE
BLOOD, URINE: NEGATIVE
BUN BLDV-MCNC: 19 MG/DL (ref 6–23)
CALCIUM SERPL-MCNC: 9.6 MG/DL (ref 8.6–10.2)
CHLORIDE BLD-SCNC: 108 MMOL/L (ref 98–107)
CLARITY: CLEAR
CO2: 26 MMOL/L (ref 22–29)
COLOR: ABNORMAL
CREAT SERPL-MCNC: 1.2 MG/DL (ref 0.5–1)
EOSINOPHILS ABSOLUTE: 0.19 E9/L (ref 0.05–0.5)
EOSINOPHILS RELATIVE PERCENT: 3.2 % (ref 0–6)
GFR AFRICAN AMERICAN: 53
GFR NON-AFRICAN AMERICAN: 44 ML/MIN/1.73
GLUCOSE BLD-MCNC: 97 MG/DL (ref 74–99)
GLUCOSE URINE: NEGATIVE MG/DL
HCT VFR BLD CALC: 44.5 % (ref 34–48)
HEMOGLOBIN: 14.3 G/DL (ref 11.5–15.5)
IMMATURE GRANULOCYTES #: 0.03 E9/L
IMMATURE GRANULOCYTES %: 0.5 % (ref 0–5)
KETONES, URINE: NEGATIVE MG/DL
LACTIC ACID: 0.7 MMOL/L (ref 0.5–2.2)
LEUKOCYTE ESTERASE, URINE: ABNORMAL
LYMPHOCYTES ABSOLUTE: 1.53 E9/L (ref 1.5–4)
LYMPHOCYTES RELATIVE PERCENT: 25.8 % (ref 20–42)
MAGNESIUM: 2.3 MG/DL (ref 1.6–2.6)
MCH RBC QN AUTO: 31.8 PG (ref 26–35)
MCHC RBC AUTO-ENTMCNC: 32.1 % (ref 32–34.5)
MCV RBC AUTO: 99.1 FL (ref 80–99.9)
MONOCYTES ABSOLUTE: 0.45 E9/L (ref 0.1–0.95)
MONOCYTES RELATIVE PERCENT: 7.6 % (ref 2–12)
NEUTROPHILS ABSOLUTE: 3.7 E9/L (ref 1.8–7.3)
NEUTROPHILS RELATIVE PERCENT: 62.2 % (ref 43–80)
NITRITE, URINE: NEGATIVE
PDW BLD-RTO: 14.7 FL (ref 11.5–15)
PH UA: 6.5 (ref 5–9)
PHOSPHORUS: 4.6 MG/DL (ref 2.5–4.5)
PLATELET # BLD: 247 E9/L (ref 130–450)
PMV BLD AUTO: 8.7 FL (ref 7–12)
POTASSIUM REFLEX MAGNESIUM: 4.8 MMOL/L (ref 3.5–5)
PRO-BNP: 444 PG/ML (ref 0–450)
PROTEIN UA: NEGATIVE MG/DL
RBC # BLD: 4.49 E12/L (ref 3.5–5.5)
RBC UA: ABNORMAL /HPF (ref 0–2)
SARS-COV-2, NAAT: NOT DETECTED
SODIUM BLD-SCNC: 143 MMOL/L (ref 132–146)
SPECIFIC GRAVITY UA: 1.01 (ref 1–1.03)
T4 FREE: 0.89 NG/DL (ref 0.93–1.7)
TOTAL PROTEIN: 7.1 G/DL (ref 6.4–8.3)
TROPONIN, HIGH SENSITIVITY: 8 NG/L (ref 0–9)
TSH SERPL DL<=0.05 MIU/L-ACNC: 13.63 UIU/ML (ref 0.27–4.2)
UROBILINOGEN, URINE: 0.2 E.U./DL
WBC # BLD: 5.9 E9/L (ref 4.5–11.5)
WBC UA: ABNORMAL /HPF (ref 0–5)

## 2021-09-06 PROCEDURE — 81001 URINALYSIS AUTO W/SCOPE: CPT

## 2021-09-06 PROCEDURE — 83880 ASSAY OF NATRIURETIC PEPTIDE: CPT

## 2021-09-06 PROCEDURE — 85025 COMPLETE CBC W/AUTO DIFF WBC: CPT

## 2021-09-06 PROCEDURE — 84443 ASSAY THYROID STIM HORMONE: CPT

## 2021-09-06 PROCEDURE — 80053 COMPREHEN METABOLIC PANEL: CPT

## 2021-09-06 PROCEDURE — 70450 CT HEAD/BRAIN W/O DYE: CPT

## 2021-09-06 PROCEDURE — APPSS45 APP SPLIT SHARED TIME 31-45 MINUTES: Performed by: NURSE PRACTITIONER

## 2021-09-06 PROCEDURE — 84100 ASSAY OF PHOSPHORUS: CPT

## 2021-09-06 PROCEDURE — 6370000000 HC RX 637 (ALT 250 FOR IP): Performed by: NURSE PRACTITIONER

## 2021-09-06 PROCEDURE — G0378 HOSPITAL OBSERVATION PER HR: HCPCS

## 2021-09-06 PROCEDURE — 72125 CT NECK SPINE W/O DYE: CPT

## 2021-09-06 PROCEDURE — 99284 EMERGENCY DEPT VISIT MOD MDM: CPT

## 2021-09-06 PROCEDURE — 83605 ASSAY OF LACTIC ACID: CPT

## 2021-09-06 PROCEDURE — 99222 1ST HOSP IP/OBS MODERATE 55: CPT | Performed by: INTERNAL MEDICINE

## 2021-09-06 PROCEDURE — 6360000002 HC RX W HCPCS: Performed by: NURSE PRACTITIONER

## 2021-09-06 PROCEDURE — 93005 ELECTROCARDIOGRAM TRACING: CPT | Performed by: EMERGENCY MEDICINE

## 2021-09-06 PROCEDURE — 83735 ASSAY OF MAGNESIUM: CPT

## 2021-09-06 PROCEDURE — 87635 SARS-COV-2 COVID-19 AMP PRB: CPT

## 2021-09-06 PROCEDURE — 96372 THER/PROPH/DIAG INJ SC/IM: CPT

## 2021-09-06 PROCEDURE — 73562 X-RAY EXAM OF KNEE 3: CPT

## 2021-09-06 PROCEDURE — 84439 ASSAY OF FREE THYROXINE: CPT

## 2021-09-06 PROCEDURE — 84484 ASSAY OF TROPONIN QUANT: CPT

## 2021-09-06 PROCEDURE — 73560 X-RAY EXAM OF KNEE 1 OR 2: CPT

## 2021-09-06 PROCEDURE — 51702 INSERT TEMP BLADDER CATH: CPT

## 2021-09-06 PROCEDURE — 71046 X-RAY EXAM CHEST 2 VIEWS: CPT

## 2021-09-06 PROCEDURE — 1200000000 HC SEMI PRIVATE

## 2021-09-06 PROCEDURE — 2580000003 HC RX 258: Performed by: NURSE PRACTITIONER

## 2021-09-06 RX ORDER — SODIUM CHLORIDE 9 MG/ML
25 INJECTION, SOLUTION INTRAVENOUS PRN
Status: DISCONTINUED | OUTPATIENT
Start: 2021-09-06 | End: 2021-09-10 | Stop reason: HOSPADM

## 2021-09-06 RX ORDER — ROPINIROLE 1 MG/1
1 TABLET, FILM COATED ORAL 3 TIMES DAILY
Status: DISCONTINUED | OUTPATIENT
Start: 2021-09-06 | End: 2021-09-10 | Stop reason: HOSPADM

## 2021-09-06 RX ORDER — TORSEMIDE 20 MG/1
40 TABLET ORAL DAILY
Status: DISCONTINUED | OUTPATIENT
Start: 2021-09-07 | End: 2021-09-10 | Stop reason: HOSPADM

## 2021-09-06 RX ORDER — RIVASTIGMINE TARTRATE 3 MG/1
3 CAPSULE ORAL 2 TIMES DAILY
Status: DISCONTINUED | OUTPATIENT
Start: 2021-09-06 | End: 2021-09-10 | Stop reason: HOSPADM

## 2021-09-06 RX ORDER — SODIUM CHLORIDE 0.9 % (FLUSH) 0.9 %
5-40 SYRINGE (ML) INJECTION EVERY 12 HOURS SCHEDULED
Status: DISCONTINUED | OUTPATIENT
Start: 2021-09-06 | End: 2021-09-10 | Stop reason: HOSPADM

## 2021-09-06 RX ORDER — ALLOPURINOL 100 MG/1
100 TABLET ORAL DAILY
Status: DISCONTINUED | OUTPATIENT
Start: 2021-09-06 | End: 2021-09-10 | Stop reason: HOSPADM

## 2021-09-06 RX ORDER — SODIUM CHLORIDE 0.9 % (FLUSH) 0.9 %
5-40 SYRINGE (ML) INJECTION PRN
Status: DISCONTINUED | OUTPATIENT
Start: 2021-09-06 | End: 2021-09-10 | Stop reason: HOSPADM

## 2021-09-06 RX ORDER — FUROSEMIDE 10 MG/ML
40 INJECTION INTRAMUSCULAR; INTRAVENOUS ONCE
Status: COMPLETED | OUTPATIENT
Start: 2021-09-06 | End: 2021-09-06

## 2021-09-06 RX ORDER — DULOXETIN HYDROCHLORIDE 60 MG/1
60 CAPSULE, DELAYED RELEASE ORAL NIGHTLY
Status: DISCONTINUED | OUTPATIENT
Start: 2021-09-06 | End: 2021-09-10 | Stop reason: HOSPADM

## 2021-09-06 RX ORDER — LEVOTHYROXINE SODIUM 0.07 MG/1
75 TABLET ORAL DAILY
Status: DISCONTINUED | OUTPATIENT
Start: 2021-09-07 | End: 2021-09-07

## 2021-09-06 RX ORDER — LEVOTHYROXINE SODIUM 0.1 MG/1
100 TABLET ORAL DAILY
COMMUNITY

## 2021-09-06 RX ORDER — TOPIRAMATE 100 MG/1
100 TABLET, FILM COATED ORAL NIGHTLY
Status: DISCONTINUED | OUTPATIENT
Start: 2021-09-06 | End: 2021-09-10 | Stop reason: HOSPADM

## 2021-09-06 RX ORDER — GABAPENTIN 300 MG/1
300 CAPSULE ORAL NIGHTLY
Status: DISCONTINUED | OUTPATIENT
Start: 2021-09-06 | End: 2021-09-10 | Stop reason: HOSPADM

## 2021-09-06 RX ORDER — DIVALPROEX SODIUM 125 MG/1
125 CAPSULE, COATED PELLETS ORAL 2 TIMES DAILY
Status: DISCONTINUED | OUTPATIENT
Start: 2021-09-06 | End: 2021-09-10 | Stop reason: HOSPADM

## 2021-09-06 RX ORDER — ACETAMINOPHEN 650 MG/1
650 SUPPOSITORY RECTAL EVERY 6 HOURS PRN
Status: DISCONTINUED | OUTPATIENT
Start: 2021-09-06 | End: 2021-09-10 | Stop reason: HOSPADM

## 2021-09-06 RX ORDER — ACETAMINOPHEN 325 MG/1
650 TABLET ORAL EVERY 4 HOURS PRN
Status: DISCONTINUED | OUTPATIENT
Start: 2021-09-06 | End: 2021-09-06 | Stop reason: SDUPTHER

## 2021-09-06 RX ORDER — ONDANSETRON 4 MG/1
4 TABLET, ORALLY DISINTEGRATING ORAL EVERY 8 HOURS PRN
Status: DISCONTINUED | OUTPATIENT
Start: 2021-09-06 | End: 2021-09-10 | Stop reason: HOSPADM

## 2021-09-06 RX ORDER — ACETAMINOPHEN 325 MG/1
650 TABLET ORAL EVERY 6 HOURS PRN
Status: DISCONTINUED | OUTPATIENT
Start: 2021-09-06 | End: 2021-09-10 | Stop reason: HOSPADM

## 2021-09-06 RX ORDER — POLYETHYLENE GLYCOL 3350 17 G/17G
17 POWDER, FOR SOLUTION ORAL DAILY PRN
Status: DISCONTINUED | OUTPATIENT
Start: 2021-09-06 | End: 2021-09-10 | Stop reason: HOSPADM

## 2021-09-06 RX ORDER — ONDANSETRON 2 MG/ML
4 INJECTION INTRAMUSCULAR; INTRAVENOUS EVERY 6 HOURS PRN
Status: DISCONTINUED | OUTPATIENT
Start: 2021-09-06 | End: 2021-09-10 | Stop reason: HOSPADM

## 2021-09-06 RX ORDER — ATORVASTATIN CALCIUM 10 MG/1
10 TABLET, FILM COATED ORAL DAILY
Status: DISCONTINUED | OUTPATIENT
Start: 2021-09-06 | End: 2021-09-10 | Stop reason: HOSPADM

## 2021-09-06 RX ADMIN — ALLOPURINOL 100 MG: 100 TABLET ORAL at 19:01

## 2021-09-06 RX ADMIN — RIVASTIGMINE TARTRATE 3 MG: 3 CAPSULE ORAL at 22:40

## 2021-09-06 RX ADMIN — TOPIRAMATE 100 MG: 100 TABLET, FILM COATED ORAL at 22:39

## 2021-09-06 RX ADMIN — DIVALPROEX SODIUM 125 MG: 125 CAPSULE, COATED PELLETS ORAL at 22:40

## 2021-09-06 RX ADMIN — ROPINIROLE HYDROCHLORIDE 1 MG: 1 TABLET, FILM COATED ORAL at 20:45

## 2021-09-06 RX ADMIN — SODIUM CHLORIDE, PRESERVATIVE FREE 10 ML: 5 INJECTION INTRAVENOUS at 19:02

## 2021-09-06 RX ADMIN — FUROSEMIDE 40 MG: 10 INJECTION, SOLUTION INTRAMUSCULAR; INTRAVENOUS at 19:02

## 2021-09-06 RX ADMIN — GABAPENTIN 300 MG: 300 CAPSULE ORAL at 20:45

## 2021-09-06 RX ADMIN — ATORVASTATIN CALCIUM 10 MG: 10 TABLET, FILM COATED ORAL at 19:01

## 2021-09-06 RX ADMIN — DULOXETINE HYDROCHLORIDE 60 MG: 60 CAPSULE, DELAYED RELEASE ORAL at 20:45

## 2021-09-06 ASSESSMENT — PAIN DESCRIPTION - LOCATION: LOCATION: FOOT;LEG;KNEE

## 2021-09-06 ASSESSMENT — PAIN SCALES - GENERAL
PAINLEVEL_OUTOF10: 0
PAINLEVEL_OUTOF10: 0

## 2021-09-06 ASSESSMENT — PAIN DESCRIPTION - PAIN TYPE: TYPE: ACUTE PAIN

## 2021-09-06 ASSESSMENT — PAIN DESCRIPTION - FREQUENCY: FREQUENCY: CONTINUOUS

## 2021-09-06 ASSESSMENT — PAIN DESCRIPTION - ORIENTATION: ORIENTATION: LEFT;RIGHT

## 2021-09-06 NOTE — ED PROVIDER NOTES
HPI:  9/6/21, Time: 3:23 PM EDT         Gayle Schmid is a 67 y.o. female presenting to the ED for left knee pain, bilateral leg swelling with a history of lymphedema and generalized fatigue, beginning 3 to 4 weeks ago. The complaint has been persistent, moderate in severity, and worsened by activity. Patient states that she had a fall about 2 weeks ago when she fell onto her left knee and has had left knee pain since then. She also states that she had a stroke 2 years ago and since then she has had some difficulty word finding. Patient mitts to left knee pain with bilateral extremity edema with generalized weakness. ROS:   Review of Systems   Constitutional: Positive for activity change and fatigue. Negative for chills and fever. HENT: Negative for ear pain and sore throat. Eyes: Negative for pain. Respiratory: Negative for shortness of breath. Cardiovascular: Positive for leg swelling. Negative for chest pain. Gastrointestinal: Negative for abdominal pain, nausea and vomiting. Genitourinary: Negative for flank pain and pelvic pain. Musculoskeletal: Negative for back pain and neck pain. Skin: Negative for rash.    Neurological: Negative for headaches.                --------------------------------------------- PAST HISTORY ---------------------------------------------  Past Medical History:  has a past medical history of Acute bronchitis, Acute on chronic diastolic (congestive) heart failure (HCC), Acute renal failure (HCC), Acute renal failure with acute cortical necrosis (HCC), Acute-on-chronic kidney injury ([facility] [address] Arthritis, Atrial fibrillation ([facility] [address] CAD (coronary artery disease), Cancer ([facility] [address] CHF (congestive heart failure) ([facility] [address] Dementia ([facility] [address] Depression, Diabetes mellitus ([facility] [address] Enteritis, H/O echocardiogram, History of blood transfusion, History of cardiovascular stress test, History of cardiovascular stress test, History of echocardiogram, History of echocardiogram, Hyperkalemia, Hyperlipidemia, Hypertension, 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 [address] Past Surgical History:  has a past surgical history that includes Cardiac surgery; hiatal hernia repair; Gallbladder surgery; Thyroid surgery; Thyroidectomy; Cholecystectomy; hernia repair; Echo Complete (1/20/2014 EF62%); Diagnostic Cardiac Cath Lab Procedure (09/10/2009); Diagnostic Cardiac Cath Lab Procedure (10/11/2007); Diagnostic Cardiac Cath Lab Procedure (09/27/2006); Diagnostic Cardiac Cath Lab Procedure (05/23/2003); Hysterectomy; and joint replacement (Bilateral). Social History:  reports that she has never smoked. She has never used smokeless tobacco. She reports that she does not drink alcohol and does not use drugs. Family History: family history includes Heart Disease (age of onset: 46) in her father; Heart Failure in her brother; Stroke in her mother. The patients home medications have been reviewed. Allergies: Patient has no known allergies. ----------------------------------------PHYSICAL EXAM--------------------------------------  Constitutional:  Well developed, well nourished, obese, ill-appearing,no acute distress, non-toxic appearance   Eyes:  PERRL, conjunctiva normal, EOMI  HENT:  Atraumatic, external ears normal, nose normal, oropharynx moist, no pharyngeal exudates. Neck- normal range of motion, no nuchal rigidity   Respiratory:  No respiratory distress, normal breath sounds, no rales, no wheezing   Cardiovascular:   Bradycardic rate, normal rhythm, no murmurs, no gallops, no rubs. Radial and DP pulses 2+ bilaterally.    GI:  Soft, nondistended, normal bowel sounds, nontender, no organomegaly, no mass, no rebound, no guarding   :  No costovertebral angle tenderness Musculoskeletal:   Bilateral lower extremity edema, left knee tenderness, no deformities. Back- no tenderness  Integument:  Well hydrated, no rash. Adequate perfusion. Lymphatic:  No cervical lymphadenopathy noted   Neurologic:  Alert & oriented x 3, CN 2-12 normal, decreased muscle strength bilateral lower extremities 3 out of 5, normal sensory function, no focal deficits noted. Patient unable to ambulate   Psychiatric:  Speech and behavior appropriate       -------------------------------------------------- RESULTS -------------------------------------------------  I have personally reviewed all laboratory and imaging results for this patient. Results are listed below.      LABS:  Results for orders placed or performed during the hospital encounter of 09/06/21   COVID-19, Rapid    Specimen: Nares   Result Value Ref Range    SARS-CoV-2, NAAT Not Detected Not Detected   CBC Auto Differential   Result Value Ref Range    WBC 5.9 4.5 - 11.5 E9/L    RBC 4.49 3.50 - 5.50 E12/L    Hemoglobin 14.3 11.5 - 15.5 g/dL    Hematocrit 44.5 34.0 - 48.0 %    MCV 99.1 80.0 - 99.9 fL    MCH 31.8 26.0 - 35.0 pg    MCHC 32.1 32.0 - 34.5 %    RDW 14.7 11.5 - 15.0 fL    Platelets 252 643 - 486 E9/L    MPV 8.7 7.0 - 12.0 fL    Neutrophils % 62.2 43.0 - 80.0 %    Immature Granulocytes % 0.5 0.0 - 5.0 %    Lymphocytes % 25.8 20.0 - 42.0 %    Monocytes % 7.6 2.0 - 12.0 %    Eosinophils % 3.2 0.0 - 6.0 %    Basophils % 0.7 0.0 - 2.0 %    Neutrophils Absolute 3.70 1.80 - 7.30 E9/L    Immature Granulocytes # 0.03 E9/L    Lymphocytes Absolute 1.53 1.50 - 4.00 E9/L    Monocytes Absolute 0.45 0.10 - 0.95 E9/L    Eosinophils Absolute 0.19 0.05 - 0.50 E9/L    Basophils Absolute 0.04 0.00 - 0.20 E9/L   Comprehensive Metabolic Panel w/ Reflex to MG   Result Value Ref Range    Sodium 143 132 - 146 mmol/L    Potassium reflex Magnesium 4.8 3.5 - 5.0 mmol/L    Chloride 108 (H) 98 - 107 mmol/L    CO2 26 22 - 29 mmol/L    Anion Gap 9 7 - 16 mmol/L Glucose 97 74 - 99 mg/dL    BUN 19 6 - 23 mg/dL    CREATININE 1.2 (H) 0.5 - 1.0 mg/dL    GFR Non-African American 44 >=60 mL/min/1.73    GFR African American 53     Calcium 9.6 8.6 - 10.2 mg/dL    Total Protein 7.1 6.4 - 8.3 g/dL    Albumin 4.1 3.5 - 5.2 g/dL    Total Bilirubin 0.3 0.0 - 1.2 mg/dL    Alkaline Phosphatase 104 35 - 104 U/L    ALT 10 0 - 32 U/L    AST 13 0 - 31 U/L   Troponin   Result Value Ref Range    Troponin, High Sensitivity 8 0 - 9 ng/L   Brain Natriuretic Peptide   Result Value Ref Range    Pro- 0 - 450 pg/mL   Lactic Acid, Plasma   Result Value Ref Range    Lactic Acid 0.7 0.5 - 2.2 mmol/L   Urinalysis, reflex to microscopic   Result Value Ref Range    Color, UA Straw Straw/Yellow    Clarity, UA Clear Clear    Glucose, Ur Negative Negative mg/dL    Bilirubin Urine Negative Negative    Ketones, Urine Negative Negative mg/dL    Specific Gravity, UA 1.010 1.005 - 1.030    Blood, Urine Negative Negative    pH, UA 6.5 5.0 - 9.0    Protein, UA Negative Negative mg/dL    Urobilinogen, Urine 0.2 <2.0 E.U./dL    Nitrite, Urine Negative Negative    Leukocyte Esterase, Urine SMALL (A) Negative   Microscopic Urinalysis   Result Value Ref Range    WBC, UA 0-1 0 - 5 /HPF    RBC, UA NONE 0 - 2 /HPF    Bacteria, UA RARE (A) None Seen /HPF   Magnesium   Result Value Ref Range    Magnesium 2.3 1.6 - 2.6 mg/dL   Phosphorus   Result Value Ref Range    Phosphorus 4.6 (H) 2.5 - 4.5 mg/dL   Vitamin B12 & folate   Result Value Ref Range    Vitamin B-12 313 211 - 946 pg/mL    Folate 12.3 4.8 - 24.2 ng/mL   Vitamin D 25 Hydroxy   Result Value Ref Range    Vit D, 25-Hydroxy 9 (L) 30 - 100 ng/mL   CBC   Result Value Ref Range    WBC 7.2 4.5 - 11.5 E9/L    RBC 4.66 3.50 - 5.50 E12/L    Hemoglobin 14.6 11.5 - 15.5 g/dL    Hematocrit 45.8 34.0 - 48.0 %    MCV 98.3 80.0 - 99.9 fL    MCH 31.3 26.0 - 35.0 pg    MCHC 31.9 (L) 32.0 - 34.5 %    RDW 14.6 11.5 - 15.0 fL    Platelets 613 499 - 010 E9/L    MPV 8.8 7.0 - 12.0 fL Basic metabolic panel   Result Value Ref Range    Sodium 144 132 - 146 mmol/L    Potassium 3.5 3.5 - 5.0 mmol/L    Chloride 104 98 - 107 mmol/L    CO2 28 22 - 29 mmol/L    Anion Gap 12 7 - 16 mmol/L    Glucose 88 74 - 99 mg/dL    BUN 19 6 - 23 mg/dL    CREATININE 1.2 (H) 0.5 - 1.0 mg/dL    GFR Non-African American 44 >=60 mL/min/1.73    GFR African American 53     Calcium 9.6 8.6 - 10.2 mg/dL   Magnesium   Result Value Ref Range    Magnesium 2.1 1.6 - 2.6 mg/dL   Phosphorus   Result Value Ref Range    Phosphorus 4.5 2.5 - 4.5 mg/dL   TSH WITHOUT REFLEX   Result Value Ref Range    TSH 13.630 (H) 0.270 - 4.200 uIU/mL   T4, free   Result Value Ref Range    T4 Free 0.89 (L) 0.93 - 1.70 ng/dL   Protime-INR   Result Value Ref Range    Protime 13.8 (H) 9.3 - 12.4 sec    INR 1.2    EKG 12 Lead   Result Value Ref Range    Ventricular Rate 55 BPM    Atrial Rate 55 BPM    P-R Interval 246 ms    QRS Duration 98 ms    Q-T Interval 438 ms    QTc Calculation (Bazett) 419 ms    P Axis 50 degrees    R Axis 11 degrees    T Axis 63 degrees       RADIOLOGY:  Interpreted by Radiologist.  XR CHEST (2 VW)   Final Result   No acute process. XR KNEE LEFT (1-2 VIEWS)   Final Result   No definite radiographically visible acute skeletal pathology      Tricompartment degenerative arthrosis again noted         CT Head WO Contrast   Final Result   No acute intracranial abnormality. CT Cervical Spine WO Contrast   Final Result   No traumatic injuries in cervical spine.                  EKG Interpretation by Me  Time: 1249  Rhythm: sinus bradycardia and 1 degree AV block  Rate: bradycardia  Axis: normal  Conduction: 1st degree AV block  ST Segments: no acute change  T Waves: no acute change  Clinical Impression: no acute changes  Comparison to prior EKG: stable as compared to patient's most recent EKG      ------------------------- NURSING NOTES AND VITALS REVIEWED ---------------------------  The nursing notes within the ED encounter and vital signs as below have been reviewed by myself. /78   Pulse 63   Temp 98.4 °F (36.9 °C) (Oral)   Resp 18   Ht 5' 6\" (1.676 m)   Wt 227 lb 2 oz (103 kg)   SpO2 95%   Breastfeeding No   BMI 36.66 kg/m²   Oxygen Saturation Interpretation: Normal      The patients available past medical records and past encounters were reviewed.         ------------------------------ ED COURSE/MEDICAL DECISION MAKING----------------------  Medications   allopurinol (ZYLOPRIM) tablet 100 mg (100 mg Oral Given 9/7/21 0855)   atorvastatin (LIPITOR) tablet 10 mg (10 mg Oral Given 9/7/21 0855)   divalproex (DEPAKOTE SPRINKLE) capsule 125 mg (125 mg Oral Given 9/7/21 2003)   DULoxetine (CYMBALTA) extended release capsule 60 mg (60 mg Oral Given 9/7/21 2003)   gabapentin (NEURONTIN) capsule 300 mg (300 mg Oral Given 9/7/21 2003)   rivaroxaban (XARELTO) tablet 20 mg (20 mg Oral Given 9/7/21 0855)   rOPINIRole (REQUIP) tablet 1 mg (1 mg Oral Given 9/7/21 2003)   topiramate (TOPAMAX) tablet 100 mg (100 mg Oral Given 9/7/21 2003)   torsemide (DEMADEX) tablet 40 mg (40 mg Oral Given 9/7/21 0856)   rivastigmine (EXELON) capsule 3 mg (3 mg Oral Given 9/7/21 2003)   sodium chloride flush 0.9 % injection 5-40 mL (10 mLs IntraVENous Given 9/7/21 2007)   sodium chloride flush 0.9 % injection 5-40 mL (has no administration in time range)   0.9 % sodium chloride infusion (has no administration in time range)   ondansetron (ZOFRAN-ODT) disintegrating tablet 4 mg (has no administration in time range)     Or   ondansetron (ZOFRAN) injection 4 mg (has no administration in time range)   polyethylene glycol (GLYCOLAX) packet 17 g (has no administration in time range)   acetaminophen (TYLENOL) tablet 650 mg (650 mg Oral Given 9/7/21 2003)     Or   acetaminophen (TYLENOL) suppository 650 mg ( Rectal See Alternative 9/7/21 2003)   perflutren lipid microspheres (DEFINITY) injection 1.65 mg (has no administration in time range) course. Re-Evaluations:  Time: 1500   Re-evaluation. Patients symptoms show no change  Repeat physical examination is not changed      Consultations:   Spoke with Dr. Cassie Werner, He will accept the patient for admission. Counseling: The emergency provider has spoken with the patient and discussed todays results, in addition to providing specific details for the plan of care and counseling regarding the diagnosis and prognosis. Questions are answered at this time and they are agreeable with the plan.    --------------------------- IMPRESSION AND DISPOSITION ---------------------------------    IMPRESSION  1. Ataxia    2. Left knee pain, unspecified chronicity    3.  Ambulatory dysfunction        DISPOSITION  Disposition: Admit to telemetry  Patient condition is stable              Daniela Lagunas DO  Resident  09/07/21 2050

## 2021-09-06 NOTE — H&P
[address] [place], [place] [address]ist Group   History and Physical      CHIEF COMPLAINT:  Left knee pain, bilateral leg swelling and generalized fatigue. History of Present Illness:  67 y.o. female with a history of stroke, thyroid and skin cancer, CHF, CKD, restless leg, atrial fibrillation presents with left knee pain, bilateral leg swelling and generalized fatigue beginning about 3 weeks ago. Patient reportedly fell about 2 weeks ago onto her left knee. She reports increased leg edema for the past 3-4 weeks and shortness of breath. She has been unable to ambulate for 2-3 days. She is from assisted living. She does have a history of stroke and has had difficulty with word finding since then. CT of the head was negative. CT of the cervical spine showed no acute fracture but did show multilevel degenerative disc disease. Xray of the left knee was done and did not show any acute fracture, but did show tricompartment degenerative arthrosis. An attempt was made to ambulate the patient and she was ataxic. Informant(s) for H&P: Patient and medical record    REVIEW OF SYSTEMS:  no fevers, chills, cp, n/v, ha, vision/hearing changes, wt changes, hot/cold flashes, other open skin lesions, diarrhea, constipation, dysuria/hematuria unless noted in HPI. Complete ROS performed with the patient and is otherwise negative.       PMH:  Past Medical History:   Diagnosis Date    Acute bronchitis 4/27/2015    Acute on chronic diastolic (congestive) heart failure ([address] 7/18/2017    Acute renal failure (HCC)     Acute renal failure with acute cortical necrosis ([address] 12/29/2013    Acute-on-chronic kidney injury ([address] 1/19/2014    Arthritis     Atrial fibrillation (HCC)     CAD (coronary artery disease)     stent x 3    Cancer ([address]     thyroid and skin    CHF (congestive heart failure) ([address]     Echo 1/20/14 EF 49% stage I diastolic    Depression     Enteritis 9/12/2014    H/O echocardiogram mouth nightly    Historical Provider, MD   divalproex (DEPAKOTE SPRINKLE) 125 MG capsule Take 125 mg by mouth 2 times daily    Historical Provider, MD   rivastigmine (EXELON) 3 MG capsule Take 3 mg by mouth 2 times daily    Historical Provider, MD   gabapentin (NEURONTIN) 300 MG capsule Take 300 mg by mouth nightly. Historical Provider, MD   levothyroxine (SYNTHROID) 75 MCG tablet Take 75 mcg by mouth Daily    Historical Provider, MD   metoprolol succinate (TOPROL XL) 50 MG extended release tablet Take 50 mg by mouth daily    Historical Provider, MD   magnesium hydroxide (MILK OF MAGNESIA) 400 MG/5ML suspension Take 30 mLs by mouth daily as needed for Constipation    Historical Provider, MD   rOPINIRole (REQUIP) 1 MG tablet Take 1 mg by mouth 3 times daily    Historical Provider, MD   torsemide (DEMADEX) 20 MG tablet Take 40 mg by mouth daily    Historical Provider, MD   acetaminophen (TYLENOL) 325 MG tablet Take 650 mg by mouth every 4 hours as needed for Pain    Historical Provider, MD   gabapentin (NEURONTIN) 300 MG capsule Take 1 capsule by mouth nightly for 90 days. 5/6/20 8/4/20  Helena Duckworth MD   atorvastatin (LIPITOR) 10 MG tablet Take 10 mg by mouth daily    Historical Provider, MD   buPROPion (WELLBUTRIN XL) 150 MG extended release tablet Take 150 mg by mouth every morning    Historical Provider, MD   topiramate (TOPAMAX) 100 MG tablet Take 100 mg by mouth nightly    Historical Provider, MD   rivaroxaban (XARELTO) 20 MG TABS tablet Take 20 mg by mouth daily (with breakfast)    Historical Provider, MD   FLUoxetine (PROZAC) 10 MG capsule Take 20 mg by mouth daily     Historical Provider, MD   allopurinol (ZYLOPRIM) 100 MG tablet Take 100 mg by mouth daily    Historical Provider, MD   Glucose Blood (BLOOD GLUCOSE TEST STRIPS) STRP For use with glucometer 3/14/18   Adebayo Andrews DO       Allergies:    Patient has no known allergies. Social History:    reports that she has never smoked.  She has never used smokeless tobacco. She reports that she does not drink alcohol and does not use drugs. Family History:   family history includes Heart Disease (age of onset: 46) in her father; Heart Failure in her brother; Stroke in her mother. PHYSICAL EXAM:  Vitals:  BP (!) 178/86   Pulse 53   Temp 97.7 °F (36.5 °C) (Oral)   Resp 18   Ht 5' 6.5\" (1.689 m)   Wt 220 lb (99.8 kg)   SpO2 97%   BMI 34.98 kg/m²     General Appearance: alert and oriented to person, place and time, periods of confusion and in no acute distress  Skin: warm and dry  Head: normocephalic and atraumatic  Eyes: pupils equal, round, and reactive to light, conjunctivae normal  Neck: neck supple and non tender without mass   Pulmonary/Chest: clear to auscultation bilaterally- no wheezes, rales or rhonchi, normal air movement, no respiratory distress  Cardiovascular: bradycardia, irregular  Abdomen: soft, non-tender, non-distended, normal bowel sounds, no masses or organomegaly  Extremities: no cyanosis, no clubbing and 3+ bilateral lower extremity edema  Neurologic: no tremor and speech normal    LABS:  Recent Labs     09/06/21  1306      K 4.8   *   CO2 26   BUN 19   CREATININE 1.2*   GLUCOSE 97   CALCIUM 9.6       Recent Labs     09/06/21  1306   WBC 5.9   RBC 4.49   HGB 14.3   HCT 44.5   MCV 99.1   MCH 31.8   MCHC 32.1   RDW 14.7      MPV 8.7       No results for input(s): POCGLU in the last 72 hours. Radiology: XR CHEST (2 VW)    Result Date: 9/6/2021  EXAMINATION: TWO XRAY VIEWS OF THE CHEST 9/6/2021 1:41 pm COMPARISON: None. HISTORY: ORDERING SYSTEM PROVIDED HISTORY: fall TECHNOLOGIST PROVIDED HISTORY: Reason for exam:->fall FINDINGS: The lungs are without acute focal process. There is no effusion or pneumothorax. The cardiomediastinal silhouette is without acute process. The osseous structures are without acute process. There are degenerative changes of the thoracic spine. No acute process.      XR KNEE LEFT (1-2 VIEWS)    Result Date: 9/6/2021  EXAMINATION: TWO XRAY VIEWS OF THE LEFT KNEE 9/6/2021 1:41 pm COMPARISON: 02/17/2021 HISTORY: ORDERING SYSTEM PROVIDED HISTORY: fall TECHNOLOGIST PROVIDED HISTORY: Reason for exam:->fall FINDINGS: There is diffuse osseous demineralization which limits the examination. Atherosclerotic calcified plaque again noted in multiple arteries. Tricompartment degenerative articular surface irregularity again present. Joint space narrowing again present in the patellofemoral and medial femorotibial joints. Prominent medial and lateral femorotibial chondrocalcinosis again present, greater laterally. There is no radiographic evidence of definite acute fracture or dislocation. No evidence of focal intraosseous lesion. No radiographically visible joint effusion. No definite radiographically visible acute skeletal pathology Tricompartment degenerative arthrosis again noted     CT Head WO Contrast    Result Date: 9/6/2021  EXAMINATION: CT OF THE HEAD WITHOUT CONTRAST  9/6/2021 1:20 pm TECHNIQUE: CT of the head was performed without the administration of intravenous contrast. Dose modulation, iterative reconstruction, and/or weight based adjustment of the mA/kV was utilized to reduce the radiation dose to as low as reasonably achievable. COMPARISON: CT of the head April 13, 2019 and August 28, 2018 HISTORY: ORDERING SYSTEM PROVIDED HISTORY: fall TECHNOLOGIST PROVIDED HISTORY: Reason for exam:->fall Has a \"code stroke\" or \"stroke alert\" been called? ->No Decision Support Exception - unselect if not a suspected or confirmed emergency medical condition->Emergency Medical Condition (MA) FINDINGS: BRAIN/VENTRICLES: There is no acute intracranial hemorrhage, mass effect or midline shift. No abnormal extra-axial fluid collection. The gray-white differentiation is maintained without evidence of an acute infarct. There is no evidence of hydrocephalus.  ORBITS: The visualized portion of the orbits demonstrate no acute abnormality. SINUSES: The visualized paranasal sinuses and mastoid air cells demonstrate no acute abnormality. SOFT TISSUES/SKULL:  No acute abnormality of the visualized skull or soft tissues. No acute intracranial abnormality. CT Cervical Spine WO Contrast    Result Date: 9/6/2021  EXAMINATION: CT OF THE CERVICAL SPINE WITHOUT CONTRAST 9/6/2021 1:20 pm TECHNIQUE: CT of the cervical spine was performed without the administration of intravenous contrast. Multiplanar reformatted images are provided for review. Dose modulation, iterative reconstruction, and/or weight based adjustment of the mA/kV was utilized to reduce the radiation dose to as low as reasonably achievable. COMPARISON: Cervical spine CT April 13, 2019 HISTORY: ORDERING SYSTEM PROVIDED HISTORY: fall TECHNOLOGIST PROVIDED HISTORY: Reason for exam:->fall Decision Support Exception - unselect if not a suspected or confirmed emergency medical condition->Emergency Medical Condition (MA) FINDINGS: BONES/ALIGNMENT: There is no acute fracture or traumatic malalignment. DEGENERATIVE CHANGES: Multilevel degenerative disc disease most prominently seen C5-C6 and C6-C7. This is unchanged compared to prior examination. SOFT TISSUES: There is no prevertebral soft tissue swelling. No traumatic injuries in cervical spine. EKG: SB 1st degree AV block    ASSESSMENT:      Principal Problem:    Ataxia  Active Problems:    Restless leg syndrome    Hypothyroidism    Lymphedema of both lower extremities    Paroxysmal atrial fibrillation (HCC)    Vascular dementia ([facility]  Resolved Problems:    * No resolved hospital problems. *      PLAN:    1. Ataxia:  CT of the head and cervical spine were negative for acute process. Rapid Covid negative. Consult PT/OT. Consult Social Work for SNF placement. 2. Bradycardia:  Heart rate was in the 40-50's upon arrival.  Hold Toprol XL.    3. Diastolic heart failure:  Last Echo on 8/31/18 revealed an EF of 89%, stage I diastolic dysfunction. Repeat Echo. Give a one time dose of IV Lasix. Resume oral Torsemide tomorrow. 4. Atrial fibrillation:  Continue Xarelto. Hold Metoprolol due to bradycardia. 5. Vascular dementia: Continue Exelon and Depakote Sprinkles. 6. Hypothyroidism:  Continue levothyroxine. Check TSH and free T4.  7. Restless leg: Continue requip. 8. Depression:  Continue Cymbalta and Trintellix. 9. Lymphedema:  Consult Wound Care for Tubi . Code Status: DNR CCA  DVT prophylaxis: Xarelto    NOTE: This report was transcribed using voice recognition software.  Every effort was made to ensure accuracy; however, inadvertent computerized transcription errors may be present.     Electronically signed by MANUEL Olmstead CNP on 9/6/2021 at 4:50 PM

## 2021-09-06 NOTE — ED NOTES
Patient incontinent of urine. Patient stood at bedside to change linens, undergarments. Patient unsteady, unable to stand without assistance. Unable to ambulate. Dr. Magda Delacruz aware.   [place] catheter placed     Dwight Naranjo RN  09/06/21 7421

## 2021-09-07 LAB
ANION GAP SERPL CALCULATED.3IONS-SCNC: 12 MMOL/L (ref 7–16)
BUN BLDV-MCNC: 19 MG/DL (ref 6–23)
CALCIUM SERPL-MCNC: 9.6 MG/DL (ref 8.6–10.2)
CHLORIDE BLD-SCNC: 104 MMOL/L (ref 98–107)
CO2: 28 MMOL/L (ref 22–29)
CREAT SERPL-MCNC: 1.2 MG/DL (ref 0.5–1)
EKG ATRIAL RATE: 55 BPM
EKG P AXIS: 50 DEGREES
EKG P-R INTERVAL: 246 MS
EKG Q-T INTERVAL: 438 MS
EKG QRS DURATION: 98 MS
EKG QTC CALCULATION (BAZETT): 419 MS
EKG R AXIS: 11 DEGREES
EKG T AXIS: 63 DEGREES
EKG VENTRICULAR RATE: 55 BPM
FOLATE: 12.3 NG/ML (ref 4.8–24.2)
GFR AFRICAN AMERICAN: 53
GFR NON-AFRICAN AMERICAN: 44 ML/MIN/1.73
GLUCOSE BLD-MCNC: 88 MG/DL (ref 74–99)
HCT VFR BLD CALC: 45.8 % (ref 34–48)
HEMOGLOBIN: 14.6 G/DL (ref 11.5–15.5)
INR BLD: 1.2
MAGNESIUM: 2.1 MG/DL (ref 1.6–2.6)
MCH RBC QN AUTO: 31.3 PG (ref 26–35)
MCHC RBC AUTO-ENTMCNC: 31.9 % (ref 32–34.5)
MCV RBC AUTO: 98.3 FL (ref 80–99.9)
PDW BLD-RTO: 14.6 FL (ref 11.5–15)
PHOSPHORUS: 4.5 MG/DL (ref 2.5–4.5)
PLATELET # BLD: 246 E9/L (ref 130–450)
PMV BLD AUTO: 8.8 FL (ref 7–12)
POTASSIUM SERPL-SCNC: 3.5 MMOL/L (ref 3.5–5)
PROTHROMBIN TIME: 13.8 SEC (ref 9.3–12.4)
RBC # BLD: 4.66 E12/L (ref 3.5–5.5)
SODIUM BLD-SCNC: 144 MMOL/L (ref 132–146)
VITAMIN B-12: 313 PG/ML (ref 211–946)
VITAMIN D 25-HYDROXY: 9 NG/ML (ref 30–100)
WBC # BLD: 7.2 E9/L (ref 4.5–11.5)

## 2021-09-07 PROCEDURE — 85610 PROTHROMBIN TIME: CPT

## 2021-09-07 PROCEDURE — 97530 THERAPEUTIC ACTIVITIES: CPT | Performed by: PHYSICAL THERAPIST

## 2021-09-07 PROCEDURE — G0378 HOSPITAL OBSERVATION PER HR: HCPCS

## 2021-09-07 PROCEDURE — 2580000003 HC RX 258: Performed by: NURSE PRACTITIONER

## 2021-09-07 PROCEDURE — 84100 ASSAY OF PHOSPHORUS: CPT

## 2021-09-07 PROCEDURE — 93010 ELECTROCARDIOGRAM REPORT: CPT | Performed by: INTERNAL MEDICINE

## 2021-09-07 PROCEDURE — 97530 THERAPEUTIC ACTIVITIES: CPT

## 2021-09-07 PROCEDURE — 83735 ASSAY OF MAGNESIUM: CPT

## 2021-09-07 PROCEDURE — 82746 ASSAY OF FOLIC ACID SERUM: CPT

## 2021-09-07 PROCEDURE — 97165 OT EVAL LOW COMPLEX 30 MIN: CPT

## 2021-09-07 PROCEDURE — 85027 COMPLETE CBC AUTOMATED: CPT

## 2021-09-07 PROCEDURE — 36415 COLL VENOUS BLD VENIPUNCTURE: CPT

## 2021-09-07 PROCEDURE — 82306 VITAMIN D 25 HYDROXY: CPT

## 2021-09-07 PROCEDURE — 6370000000 HC RX 637 (ALT 250 FOR IP): Performed by: NURSE PRACTITIONER

## 2021-09-07 PROCEDURE — 80048 BASIC METABOLIC PNL TOTAL CA: CPT

## 2021-09-07 PROCEDURE — 1200000000 HC SEMI PRIVATE

## 2021-09-07 PROCEDURE — 97161 PT EVAL LOW COMPLEX 20 MIN: CPT | Performed by: PHYSICAL THERAPIST

## 2021-09-07 PROCEDURE — 82607 VITAMIN B-12: CPT

## 2021-09-07 RX ORDER — AMLODIPINE BESYLATE 5 MG/1
5 TABLET ORAL DAILY
Status: DISCONTINUED | OUTPATIENT
Start: 2021-09-07 | End: 2021-09-10 | Stop reason: HOSPADM

## 2021-09-07 RX ORDER — LEVOTHYROXINE SODIUM 0.1 MG/1
100 TABLET ORAL DAILY
Status: DISCONTINUED | OUTPATIENT
Start: 2021-09-08 | End: 2021-09-10 | Stop reason: HOSPADM

## 2021-09-07 RX ADMIN — DIVALPROEX SODIUM 125 MG: 125 CAPSULE, COATED PELLETS ORAL at 20:03

## 2021-09-07 RX ADMIN — LEVOTHYROXINE SODIUM 75 MCG: 0.07 TABLET ORAL at 05:48

## 2021-09-07 RX ADMIN — RIVASTIGMINE TARTRATE 3 MG: 3 CAPSULE ORAL at 20:03

## 2021-09-07 RX ADMIN — ACETAMINOPHEN 650 MG: 325 TABLET ORAL at 20:03

## 2021-09-07 RX ADMIN — ROPINIROLE HYDROCHLORIDE 1 MG: 1 TABLET, FILM COATED ORAL at 08:55

## 2021-09-07 RX ADMIN — RIVAROXABAN 20 MG: 20 TABLET, FILM COATED ORAL at 08:55

## 2021-09-07 RX ADMIN — TORSEMIDE 40 MG: 20 TABLET ORAL at 08:56

## 2021-09-07 RX ADMIN — DIVALPROEX SODIUM 125 MG: 125 CAPSULE, COATED PELLETS ORAL at 08:55

## 2021-09-07 RX ADMIN — TOPIRAMATE 100 MG: 100 TABLET, FILM COATED ORAL at 20:03

## 2021-09-07 RX ADMIN — ROPINIROLE HYDROCHLORIDE 1 MG: 1 TABLET, FILM COATED ORAL at 20:03

## 2021-09-07 RX ADMIN — SODIUM CHLORIDE, PRESERVATIVE FREE 10 ML: 5 INJECTION INTRAVENOUS at 08:57

## 2021-09-07 RX ADMIN — RIVASTIGMINE TARTRATE 3 MG: 3 CAPSULE ORAL at 08:55

## 2021-09-07 RX ADMIN — ROPINIROLE HYDROCHLORIDE 1 MG: 1 TABLET, FILM COATED ORAL at 13:37

## 2021-09-07 RX ADMIN — AMLODIPINE BESYLATE 5 MG: 5 TABLET ORAL at 08:55

## 2021-09-07 RX ADMIN — ATORVASTATIN CALCIUM 10 MG: 10 TABLET, FILM COATED ORAL at 08:55

## 2021-09-07 RX ADMIN — SODIUM CHLORIDE, PRESERVATIVE FREE 10 ML: 5 INJECTION INTRAVENOUS at 20:07

## 2021-09-07 RX ADMIN — GABAPENTIN 300 MG: 300 CAPSULE ORAL at 20:03

## 2021-09-07 RX ADMIN — DULOXETINE HYDROCHLORIDE 60 MG: 60 CAPSULE, DELAYED RELEASE ORAL at 20:03

## 2021-09-07 RX ADMIN — VORTIOXETINE 5 MG: 10 TABLET, FILM COATED ORAL at 08:55

## 2021-09-07 RX ADMIN — ALLOPURINOL 100 MG: 100 TABLET ORAL at 08:55

## 2021-09-07 ASSESSMENT — ENCOUNTER SYMPTOMS
BACK PAIN: 0
SORE THROAT: 0
ABDOMINAL PAIN: 0
VOMITING: 0
EYE PAIN: 0
NAUSEA: 0
SHORTNESS OF BREATH: 0

## 2021-09-07 ASSESSMENT — PAIN DESCRIPTION - LOCATION: LOCATION: ARM

## 2021-09-07 ASSESSMENT — PAIN DESCRIPTION - ORIENTATION: ORIENTATION: RIGHT;LEFT

## 2021-09-07 ASSESSMENT — PAIN SCALES - GENERAL
PAINLEVEL_OUTOF10: 7
PAINLEVEL_OUTOF10: 5

## 2021-09-07 NOTE — CARE COORDINATION
9/7/21 Negative covid 9/6/21. SS consult for discharge planning. Monserrat Dinh will try for precert with [address] for [facility] discharge plan. If denied will need to update legal guardian Lesly Osei- and she will provide for SNF versus AL return. Call placed to Irl Demetrio and if scores high pt may just return to [address]- if denied [facility]. HHC through [facility] - would need script -written order- and fortino can set up the [facility]- through [facility] choice or [facility].  [facility] will start the precert with [facility] first. Electronically signed by JOHANA Quiroz on 9/7/2021 at 2:53 PM

## 2021-09-07 NOTE — ACP (ADVANCE CARE PLANNING)
Advance Care Planning   Healthcare Decision Maker:    Primary Decision Maker: Getachew Clark - [phone]       Call to legal guardian Javier Abarca to confirm.  Electronically signed by JOHANA Fox on 9/7/2021 at 9:40 AM

## 2021-09-07 NOTE — PROGRESS NOTES
syndrome    Hypothyroidism    Lymphedema of both lower extremities    Paroxysmal atrial fibrillation (HCC)    Vascular dementia ([facility]    Ambulatory dysfunction    Left knee pain  Resolved Problems:    * No resolved hospital problems.  *        Plan:  PT and OT improve gait stability  My need rehabilitation    Brianna Keita MD  12:06 PM  9/7/2021

## 2021-09-07 NOTE — PROGRESS NOTES
[address] [facility]  [place]. [place]        Date:2021                                                  Patient Name: Janet Wang    MRN: [id number]    : 1945    [facility]18      Evaluating OT: Kay Johnson OTR/L; [id number]     Referring Provider and Specific Provider Orders/Date:      21  OT eval and treat Start: 21, End: 21, ONE TIME, Standing Count: 1 Occurrences, R      Devin Lange, APRN - CNP     Placement Recommendation: Subacute        Diagnosis:   1. Ataxia    2. Left knee pain, unspecified chronicity    3.  Ambulatory dysfunction         Surgery: none       Pertinent Medical History:       Past Medical History:   Diagnosis Date    Acute bronchitis 2015    Acute on chronic diastolic (congestive) heart failure ([address] 2017    Acute renal failure (HCC)     Acute renal failure with acute cortical necrosis ([address] 2013    Acute-on-chronic kidney injury ([address] 2014    Arthritis     Atrial fibrillation (HCC)     CAD (coronary artery disease)     stent x 3    Cancer ([address]     thyroid and skin    CHF (congestive heart failure) ([address]     Echo 14 EF 31% stage I diastolic    Dementia ([address]     Depression     Diabetes mellitus ([address]     Enteritis 2014    H/O echocardiogram 4/10/15    EF 81% stage I diastolic    History of blood transfusion     History of cardiovascular stress test 14    Lexiscan    History of cardiovascular stress test 2017    Lexiscan stress test    History of echocardiogram 2016    EF 70-75%, There is doppler evidence of stage I diastolic dysfunction    History of echocardiogram 12/15/2017    EF 70%    Hyperkalemia 2014    Hyperlipidemia     Hypertension     Hyperuricemia without signs inflammatory arthritis/tophaceous disease     Hypothyroidism     Lymphedema     Mood and affect disturbance     Restless leg syndrome     Stroke [facility])     pt stated 6 strokes    Unspecified cerebral artery occlusion with cerebral infarction     Unstable angina ([facility] 1/19/2014         Past Surgical History:   Procedure Laterality Date    CARDIAC SURGERY      CHOLECYSTECTOMY      DIAGNOSTIC CARDIAC CATH LAB PROCEDURE  09/10/2009    LV 70%. stenotic CAD. Previously placed BM stent: LAD-patent. Successful GUILLERMO of LAD: mid    DIAGNOSTIC CARDIAC CATH LAB PROCEDURE  10/11/2007    LVF well preserved, EF 70%. No MR. Patent coronary arteries. Patent stented segments in LAD.  DIAGNOSTIC CARDIAC CATH LAB PROCEDURE  09/27/2006    Patent stents in proximal and mid LAD with mild instent stenosis. Mild CAD of mid LAD 30% stenosis. Normal LVSF, EF 60%.  DIAGNOSTIC CARDIAC CATH LAB PROCEDURE  05/23/2003    EF and contraction pattern normal. No MR. Normal LVF. Atherosclerotic CAD.      ECHO COMPLETE  1/20/2014 EF62%    stage I diastolic    GALLBLADDER SURGERY      HERNIA REPAIR      HIATAL HERNIA REPAIR      HYSTERECTOMY      JOINT REPLACEMENT Bilateral     knee    THYROID SURGERY      THYROIDECTOMY        Precautions:  Fall Risk, had recent falls at the [address], hx of CVA       Assessment of current deficits    [x] Functional mobility  [x]ADLs  [x] Strength               []Cognition    [x] Functional transfers   [x] IADLs         [] Safety Awareness   [x]Endurance    [] Fine Coordination              [x] Balance      [] Vision/perception   []Sensation     []Gross Motor Coordination  [] ROM  [] Delirium                   [] Motor Control     OT PLAN OF CARE   OT POC based on physician orders, patient diagnosis and results of clinical assessment    Frequency/Duration 1-3 days/wk for 2 weeks PRN     Specific OT Treatment Interventions to include:   * Instruction/training on adapted ADL techniques and AE recommendations to increase functional independence within precautions       * Training on energy conservation strategies, correct breathing pattern and techniques to improve independence/tolerance for self-care routine  * Functional transfer/mobility training/DME recommendations for increased independence, safety, and fall prevention  * Patient/Family education to increase follow through with safety techniques and functional independence  * Recommendation of environmental modifications for increased safety with functional transfers/mobility and ADLs  * Therapeutic exercise to improve motor endurance, ROM, and functional strength for ADLs/functional transfers  * Therapeutic activities to facilitate/challenge dynamic balance, stand tolerance for increased safety and independence with ADLs  * Positioning to improve skin integrity, interaction with environment and functional independence    Recommended Adaptive Equipment: none      Home Living: Pt [facility] us [facility] owned: wheeled walker, cane, wheelchair, shower chair, hospital bed     Prior Level of Function: Needs assistance with ADLs and IADLs; ambulated wheeled walker, had recent falls at the [facility]    Driving: no     Pain Level: no pain at time of evaluation; Nursing notified.       Cognition: A&O: 3/4; Follows 1 step directions   Memory: fair    Sequencing: fair    Problem solving: fair    Judgement/safety: fair     [facility]   AM-PAC Daily Activity Inpatient   How much help for putting on and taking off regular lower body clothing?: A Lot  How much help for Bathing?: A Lot  How much help for Toileting?: A Lot  How much help for putting on and taking off regular upper body clothing?: A Little  How much help for taking care of personal grooming?: A Little  How much help for eating meals?: None  AM-[facility] Inpatient Daily Activity Raw Score: 16  AM-PAC Inpatient ADL T-Scale Score : 35.96  ADL Inpatient CMS 0-100% Score: 53.32  ADL Inpatient CMS G-Code Modifier : CK     Functional Assessment:    Initial Eval Status  Date: 9/7/21 Treatment Status  Date: STGs = LTGs  Time frame: 10-14 days   Feeding Independent   Independent    Grooming Minimal Assist   Independent    UB Dressing Minimal Assist   Independent    LB Dressing Maximal Assist   Modified [place]    Bathing Moderate Assist   Supervision    Toileting Moderate Assist   Supervision    Bed Mobility  Supine to sit: Supervision   Sit to supine: Supervision   Supine to sit: Independent   Sit to supine: Independent    Functional Transfers Supervision from EOB to wheeled walker  Minimal Assist for transfer to and from low commode with minimal verbal cues to use grab bar for safe commode transfer. Transfer training with verbal cues for hand placement throughout session to improve safety. Independent    Functional Mobility Minimal Assist with wheeled walker to improve balance to and from bathroom, verbal cues for walker sequence and safety. Modified [place]    Balance Sitting:     Static: good      Dynamic: fair   Standing: fair  with wheeled walker     Activity Tolerance fair   good    Visual/  Perceptual Glasses: yes                 Hand Dominance: right      AROM (PROM) Strength Additional Info:    RUE  WFL 4/5 good  and wfl FMC/dexterity noted during ADL tasks     LUE WFL 4- /5 good  and wfl FMC/dexterity noted during ADL tasks       [facility]   Sensation:  No c/o numbness or tingling  Tone: WFL   Edema: yes, B LE's    Comments: Upon arrival the patient was supine. At end of session, patient was supine with HOB elevated as pt declined sitting up in bedside chair. Call light and phone within reach, all lines and tubes intact. Assistance to dial daughters phone number from hospital phone. Overall patient demonstrated decreased independence and safety during completion of ADL/functional transfer/mobility tasks.   Pt would benefit from continued skilled OT to increase safety and independence with completion of ADL/IADL tasks for functional independence and quality of life. Treatment: OT treatment provided this date includes:    Instruction/training on safety and adapted techniques for completion of ADLs    Instruction/training on safe functional mobility/transfer techniques    Instruction/training on energy conservation/work simplification for completion of ADLs    Proper Positioning/Alignment    Rehab Potential: Good for established goals. Patient / Family Goal: return home      Patient and/or family were instructed on functional diagnosis, prognosis/goals and OT plan of care. Demonstrated good understanding. Eval Complexity: Low    Time In: 1:30pm   Time Out: 2:00pm    Total Treatment Time: 10      Min Units   OT Eval Low 97165  X  1    OT Eval Medium 06089      OT Eval High 06733      OT Re-Eval [id number]            ADL/Self Care 06713     Therapeutic Activities 01291  10  1    Therapeutic Ex 98765       Orthotic Management 02692       Manual [id number]     Neuro Re-Ed 22500       Non-Billable Time        Evaluation Time additionally includes thorough review of current medical information, gathering information on past medical history/social history and prior level of function, interpretation of standardized testing/informal observation of tasks, assessment of data and development of plan of care and goals.         Evaluating OT: Elizabeth Weber OTR/L; [id number]

## 2021-09-07 NOTE — PROGRESS NOTES
Physical Therapy Initial Evaluation/Plan of Care    Room #:  9687/4730-11  Patient Name: Robert Delaney  YOB: 1945  MRN: [id number]    Date of Service: 9/7/2021     Tentative placement recommendation: Subacute vs Home Health Physical Therapy if patient meets goals  Equipment recommendation: None      Evaluating Physical Therapist: Daniel Roca, PT, DPT #[id number]      Specific Provider Orders/Date/Referring Provider :   09/06/21 1815   PT evaluation and treat Start: 09/06/21 1815, End: 09/06/21 1815, ONE TIME, Standing Count: 1 Occurrences, R    Mya Wang, APRN - CNP Acknowledge New    Admitting Diagnosis:   Ataxia [R27.0]  Ambulatory dysfunction [R26.2]  Left knee pain, unspecified chronicity [M25.562]        Surgery: None    Patient Active Problem List   Diagnosis    Essential hypertension    Coronary artery disease involving native coronary artery without angina pectoris    Depression    Dyslipidemia    Restless leg syndrome    Hypothyroidism    Peripheral edema    Chronic kidney disease, stage III (moderate) ([address]    Morbid obesity ([address]    Left upper extremity numbness    Lymphedema of both lower extremities    Chronic diastolic heart failure (HCC)    Paroxysmal atrial fibrillation ([address]    First degree atrioventricular block    Anasarca    Prediabetes    Failure to thrive (0-17)    Cystitis    Vascular dementia ([address]    Ataxia    Ambulatory dysfunction    Left knee pain        ASSESSMENT of Current Deficits Patient exhibits decreased strength, balance and endurance impairing functional mobility, transfers, gait , gait distance and tolerance to activity. Pt performed all function very slowly especially ambulation and required multiple VC for safe use of [address] and [address] approximation.       PHYSICAL THERAPY  PLAN OF CARE       Physical therapy plan of care is established based on physician order,  patient diagnosis and clinical assessment    Current Treatment Recommendations:    -Bed Mobility: Lower extremity exercises  and Trunk control activities   -Sitting Balance: Incorporate reaching activities to activate trunk muscles , Hands on support to maintain midline , Facilitate active trunk muscle engagement , Facilitate postural control in all planes  and Engage in core activities to allow for movement within base of support   -Standing Balance: Perform strengthening exercises in standing to promote motor control with or without upper extremity support , Instruct patient on adequate base of support to maintain balance and Challenge balance utilizing reaching  activities beyond center of gravity    -Transfers: Provide instruction on proper hand and foot position for adequate transfer of weight onto lower extremities and use of gait device, Cues for hand placement, technique and safety, Facilitate weight shift forward on to lower extremities and provide necessary stabilization of bilateral lower extremities , Support transfer of weight on to lower extremities, Assist with extension of knees trunk and hip to accept weight transfer  and Provide stabilization to prevent fall   -Gait: Gait training, Standing activities to improve: base of support, weight shift, weight bearing , Exercises to improve trunk control, Exercises to improve hip and knee control, Performance of protected weight bearing activities and Activities to increase weight bearing   -Endurance: Utilize Supervised activities to increase level of endurance to allow for safe functional mobility including transfers and gait  and Use graduated activities to promote good breathing techniques and provide support and education to maximize respiratory function    PT long term treatment goals are located in below grid    Patient and or family understand(s) diagnosis, prognosis, and plan of care. Frequency of treatments: Patient will be seen  daily.          Prior Level of Function: Patient ambulated with wheeled walker Rehab Potential: good  for baseline    Past medical history:   Past Medical History:   Diagnosis Date    Acute bronchitis 4/27/2015    Acute on chronic diastolic (congestive) heart failure ([address] 7/18/2017    Acute renal failure (HCC)     Acute renal failure with acute cortical necrosis ([address] 12/29/2013    Acute-on-chronic kidney injury ([address] 1/19/2014    Arthritis     Atrial fibrillation (HCC)     CAD (coronary artery disease)     stent x 3    Cancer ([address]     thyroid and skin    CHF (congestive heart failure) ([address]     Echo 1/20/14 EF 11% stage I diastolic    Dementia ([address]     Depression     Diabetes mellitus ([address]     Enteritis 9/12/2014    H/O echocardiogram 4/10/15    EF 58% stage I diastolic    History of blood transfusion     History of cardiovascular stress test 1/20/14    Lexiscan    History of cardiovascular stress test 11/20/2017    Lexiscan stress test    History of echocardiogram 03/19/2016    EF 70-75%, There is doppler evidence of stage I diastolic dysfunction    History of echocardiogram 12/15/2017    EF 70%    Hyperkalemia 1/1/2014    Hyperlipidemia     Hypertension     Hyperuricemia without signs inflammatory arthritis/tophaceous disease     Hypothyroidism     Lymphedema     Mood and affect disturbance     Restless leg syndrome     Stroke [facility])     pt stated 6 strokes    Unspecified cerebral artery occlusion with cerebral infarction     Unstable angina ([address] 1/19/2014     Past Surgical History:   Procedure Laterality Date    CARDIAC SURGERY      CHOLECYSTECTOMY      DIAGNOSTIC CARDIAC CATH LAB PROCEDURE  09/10/2009    LV 70%. stenotic CAD. Previously placed BM stent: LAD-patent. Successful GUILLERMO of LAD: mid    DIAGNOSTIC CARDIAC CATH LAB PROCEDURE  10/11/2007    LVF well preserved, EF 70%. No MR. Patent coronary arteries. Patent stented segments in LAD.     DIAGNOSTIC CARDIAC CATH LAB PROCEDURE  09/27/2006    Patent stents in proximal and mid LAD with mild instent stenosis. Mild CAD of mid LAD 30% stenosis. Normal LVSF, EF 60%.  DIAGNOSTIC CARDIAC CATH LAB PROCEDURE  05/23/2003    EF and contraction pattern normal. No MR. Normal LVF. Atherosclerotic CAD.  ECHO COMPLETE  1/20/2014 EF62%    stage I diastolic    GALLBLADDER SURGERY      HERNIA REPAIR      HIATAL HERNIA REPAIR      HYSTERECTOMY      JOINT REPLACEMENT Bilateral     knee    THYROID SURGERY      THYROIDECTOMY         SUBJECTIVE:    Precautions: Up with assistance, falls   Social history: Patient at [facility] with  Walk in shower grab bars    Equipment owned: Wheelchair, [employer] and [employer] chair    [address] [employer]   17/24    AM-[facility] Mobility Inpatient   How much difficulty turning over in bed?: A Little  How much difficulty sitting down on / standing up from a chair with arms?: A Little  How much difficulty moving from lying on back to sitting on side of bed?: A Little  How much help from another person moving to and from a bed to a chair?: A Little  How much help from another person needed to walk in hospital room?: A Little  How much help from another person for climbing 3-5 steps with a railing?: A Lot  AM-PAC Inpatient Mobility Raw Score : 17  AM-PAC Inpatient T-Scale Score : 42.13  Mobility Inpatient CMS 0-100% Score: 50.57  Mobility Inpatient CMS G-Code Modifier : CK    Nursing cleared patient for PT evaluation. The admitting diagnosis and active problem list as listed above have been reviewed prior to the initiation of this evaluation. OBJECTIVE;   Initial Evaluation  Date: 9/7/2021 Treatment Date:     Short Term/ Long Term   Goals   Was pt agreeable to Eval/treatment? Yes  To be met in 3 days   Pain level   0/10       Bed Mobility    Rolling: Supervision     Supine to sit: Supervision     Sit to supine: Supervision     Scooting: Supervision     Rolling: Independent    Supine to sit:  Independent    Sit to supine: Independent    Scooting: Independent     Transfers Sit to stand: Minimal assist of 1   Sit to stand: Independent    Ambulation    40 feet using  wheeled walker with Minimal assist of 1   for walker control, balance and safety   > 100 feet using  wheeled walker with Modified Independent    Stair negotiation: ascended and descended   Not assessed      ROM Within functional limits    Increase range of motion 10% of affected joints    Strength BUE:  refer to OT eval  RLE:  4-/5  LLE:  4-/5  Increase strength in affected mm groups by 1/3 grade   Balance Sitting EOB:  fair +  Dynamic Standing:  fair +  Sitting EOB:  good   Dynamic Standing: good      Patient is Alert & Oriented x person, place, time and situation and follows directions    Sensation:  Patient  denies numbness/tingling   Edema:  none noted   Endurance: fair     Vitals: room air   Blood Pressure at rest  Blood Pressure during session    Heart Rate at rest  Heart Rate during session    SPO2 at rest %  SPO2 during session %     Patient education  Patient educated on role of Physical Therapy, risks of immobility, safety and plan of care, energy conservation,  importance of mobility while in hospital , importance and purpose of adaptive device and adjusted to proper height for the patient. and safety      Patient response to education:   Pt verbalized understanding Pt demonstrated skill Pt requires further education in this area   Yes Partial Yes      Treatment:  Patient practiced and was instructed/facilitated in the following treatment: Patient  Sat edge of bed 10 minutes with Supervision  to increase dynamic sitting balance and activity tolerance. Pt performed bed mobility, transfers, ambulation then was returned to bed. Therapeutic Exercises:  not performed      At end of session, patient in bed with call light and phone within reach,  all lines and tubes intact, nursing notified. Patient would benefit from continued skilled Physical Therapy to improve functional independence and quality of life. Patient's/ family goals   Go home with her daughter    Time in  200  Time out  1045    Total Treatment Time  19 minutes    Evaluation time includes thorough review of current medical information, gathering information on past medical history/social history and prior level of function, completion of standardized testing/informal observation of tasks, assessment of data, and development of Plan of care and goals.      CPT codes:  Low Complexity PT evaluation (84654)  Therapeutic activities (80241)   19 minutes  1 unit(s)    Nikkie Mora, PT

## 2021-09-08 LAB
LV EF: 70 %
LVEF MODALITY: NORMAL

## 2021-09-08 PROCEDURE — G0378 HOSPITAL OBSERVATION PER HR: HCPCS

## 2021-09-08 PROCEDURE — C8929 TTE W OR WO FOL WCON,DOPPLER: HCPCS

## 2021-09-08 PROCEDURE — 6360000004 HC RX CONTRAST MEDICATION: Performed by: NURSE PRACTITIONER

## 2021-09-08 PROCEDURE — U0005 INFEC AGEN DETEC AMPLI PROBE: HCPCS

## 2021-09-08 PROCEDURE — 6370000000 HC RX 637 (ALT 250 FOR IP): Performed by: NURSE PRACTITIONER

## 2021-09-08 PROCEDURE — 6370000000 HC RX 637 (ALT 250 FOR IP): Performed by: INTERNAL MEDICINE

## 2021-09-08 PROCEDURE — 2580000003 HC RX 258: Performed by: NURSE PRACTITIONER

## 2021-09-08 PROCEDURE — 1200000000 HC SEMI PRIVATE

## 2021-09-08 PROCEDURE — U0003 INFECTIOUS AGENT DETECTION BY NUCLEIC ACID (DNA OR RNA); SEVERE ACUTE RESPIRATORY SYNDROME CORONAVIRUS 2 (SARS-COV-2) (CORONAVIRUS DISEASE [COVID-19]), AMPLIFIED PROBE TECHNIQUE, MAKING USE OF HIGH THROUGHPUT TECHNOLOGIES AS DESCRIBED BY CMS-2020-01-R: HCPCS

## 2021-09-08 RX ORDER — VITAMIN B COMPLEX
2000 TABLET ORAL DAILY
Status: DISCONTINUED | OUTPATIENT
Start: 2021-09-08 | End: 2021-09-10 | Stop reason: HOSPADM

## 2021-09-08 RX ADMIN — ALLOPURINOL 100 MG: 100 TABLET ORAL at 08:33

## 2021-09-08 RX ADMIN — RIVASTIGMINE TARTRATE 3 MG: 3 CAPSULE ORAL at 20:32

## 2021-09-08 RX ADMIN — TORSEMIDE 40 MG: 20 TABLET ORAL at 08:33

## 2021-09-08 RX ADMIN — ACETAMINOPHEN 650 MG: 325 TABLET ORAL at 06:33

## 2021-09-08 RX ADMIN — DULOXETINE HYDROCHLORIDE 60 MG: 60 CAPSULE, DELAYED RELEASE ORAL at 20:32

## 2021-09-08 RX ADMIN — RIVAROXABAN 20 MG: 20 TABLET, FILM COATED ORAL at 08:32

## 2021-09-08 RX ADMIN — SODIUM CHLORIDE, PRESERVATIVE FREE 10 ML: 5 INJECTION INTRAVENOUS at 20:34

## 2021-09-08 RX ADMIN — TOPIRAMATE 100 MG: 100 TABLET, FILM COATED ORAL at 20:32

## 2021-09-08 RX ADMIN — PERFLUTREN 2.2 MG: 6.52 INJECTION, SUSPENSION INTRAVENOUS at 11:24

## 2021-09-08 RX ADMIN — ROPINIROLE HYDROCHLORIDE 1 MG: 1 TABLET, FILM COATED ORAL at 13:07

## 2021-09-08 RX ADMIN — ACETAMINOPHEN 650 MG: 325 TABLET ORAL at 20:51

## 2021-09-08 RX ADMIN — ATORVASTATIN CALCIUM 10 MG: 10 TABLET, FILM COATED ORAL at 08:32

## 2021-09-08 RX ADMIN — ROPINIROLE HYDROCHLORIDE 1 MG: 1 TABLET, FILM COATED ORAL at 08:32

## 2021-09-08 RX ADMIN — RIVASTIGMINE TARTRATE 3 MG: 3 CAPSULE ORAL at 08:31

## 2021-09-08 RX ADMIN — GABAPENTIN 300 MG: 300 CAPSULE ORAL at 20:32

## 2021-09-08 RX ADMIN — VORTIOXETINE 5 MG: 10 TABLET, FILM COATED ORAL at 08:31

## 2021-09-08 RX ADMIN — SODIUM CHLORIDE, PRESERVATIVE FREE 10 ML: 5 INJECTION INTRAVENOUS at 08:33

## 2021-09-08 RX ADMIN — LEVOTHYROXINE SODIUM 100 MCG: 0.1 TABLET ORAL at 06:31

## 2021-09-08 RX ADMIN — DIVALPROEX SODIUM 125 MG: 125 CAPSULE, COATED PELLETS ORAL at 08:31

## 2021-09-08 RX ADMIN — DIVALPROEX SODIUM 125 MG: 125 CAPSULE, COATED PELLETS ORAL at 20:32

## 2021-09-08 RX ADMIN — Medication 2000 UNITS: at 10:26

## 2021-09-08 RX ADMIN — ROPINIROLE HYDROCHLORIDE 1 MG: 1 TABLET, FILM COATED ORAL at 20:32

## 2021-09-08 RX ADMIN — AMLODIPINE BESYLATE 5 MG: 5 TABLET ORAL at 08:32

## 2021-09-08 ASSESSMENT — PAIN SCALES - GENERAL
PAINLEVEL_OUTOF10: 4
PAINLEVEL_OUTOF10: 8
PAINLEVEL_OUTOF10: 6

## 2021-09-08 ASSESSMENT — PAIN DESCRIPTION - ORIENTATION: ORIENTATION: LEFT

## 2021-09-08 ASSESSMENT — PAIN DESCRIPTION - DESCRIPTORS: DESCRIPTORS: SORE

## 2021-09-08 ASSESSMENT — PAIN - FUNCTIONAL ASSESSMENT: PAIN_FUNCTIONAL_ASSESSMENT: PREVENTS OR INTERFERES SOME ACTIVE ACTIVITIES AND ADLS

## 2021-09-08 ASSESSMENT — PAIN DESCRIPTION - LOCATION: LOCATION: HIP

## 2021-09-08 ASSESSMENT — PAIN DESCRIPTION - PROGRESSION: CLINICAL_PROGRESSION: NOT CHANGED

## 2021-09-08 ASSESSMENT — PAIN DESCRIPTION - FREQUENCY: FREQUENCY: CONTINUOUS

## 2021-09-08 ASSESSMENT — PAIN DESCRIPTION - ONSET: ONSET: ON-GOING

## 2021-09-08 ASSESSMENT — PAIN DESCRIPTION - PAIN TYPE: TYPE: ACUTE PAIN

## 2021-09-08 NOTE — CARE COORDINATION
Ss note:9/8/2021.9:07 AM Pt has Legal Guardian, Suresh Beavers. Pt is from [facility]. Neg covid on 9-6-2021. Per Sima Booth at [facility], [address] was submitted, will await insurance response. BD JESUS TEST ORDERED TODAY. If unable to go to [facility], will need to reach out to [facility] and Legal Guardain to determine if pt can return to [place] or would need SNF. At this time awaiting insurance response for [facility].  CHERY Syed

## 2021-09-09 LAB
ALBUMIN SERPL-MCNC: 4.1 G/DL (ref 3.5–5.2)
ALP BLD-CCNC: 98 U/L (ref 35–104)
ALT SERPL-CCNC: 10 U/L (ref 0–32)
ANION GAP SERPL CALCULATED.3IONS-SCNC: 10 MMOL/L (ref 7–16)
AST SERPL-CCNC: 12 U/L (ref 0–31)
BASOPHILS ABSOLUTE: 0.04 E9/L (ref 0–0.2)
BASOPHILS RELATIVE PERCENT: 0.6 % (ref 0–2)
BILIRUB SERPL-MCNC: 0.5 MG/DL (ref 0–1.2)
BUN BLDV-MCNC: 32 MG/DL (ref 6–23)
CALCIUM SERPL-MCNC: 9.8 MG/DL (ref 8.6–10.2)
CHLORIDE BLD-SCNC: 105 MMOL/L (ref 98–107)
CO2: 27 MMOL/L (ref 22–29)
CREAT SERPL-MCNC: 1.5 MG/DL (ref 0.5–1)
EOSINOPHILS ABSOLUTE: 0.31 E9/L (ref 0.05–0.5)
EOSINOPHILS RELATIVE PERCENT: 4.9 % (ref 0–6)
GFR AFRICAN AMERICAN: 41
GFR NON-AFRICAN AMERICAN: 34 ML/MIN/1.73
GLUCOSE BLD-MCNC: 102 MG/DL (ref 74–99)
HCT VFR BLD CALC: 46 % (ref 34–48)
HEMOGLOBIN: 14.8 G/DL (ref 11.5–15.5)
IMMATURE GRANULOCYTES #: 0.02 E9/L
IMMATURE GRANULOCYTES %: 0.3 % (ref 0–5)
LYMPHOCYTES ABSOLUTE: 1.85 E9/L (ref 1.5–4)
LYMPHOCYTES RELATIVE PERCENT: 29 % (ref 20–42)
MCH RBC QN AUTO: 31.8 PG (ref 26–35)
MCHC RBC AUTO-ENTMCNC: 32.2 % (ref 32–34.5)
MCV RBC AUTO: 98.9 FL (ref 80–99.9)
MONOCYTES ABSOLUTE: 0.54 E9/L (ref 0.1–0.95)
MONOCYTES RELATIVE PERCENT: 8.5 % (ref 2–12)
NEUTROPHILS ABSOLUTE: 3.63 E9/L (ref 1.8–7.3)
NEUTROPHILS RELATIVE PERCENT: 56.7 % (ref 43–80)
PDW BLD-RTO: 14.5 FL (ref 11.5–15)
PLATELET # BLD: 236 E9/L (ref 130–450)
PMV BLD AUTO: 9 FL (ref 7–12)
POTASSIUM SERPL-SCNC: 3.9 MMOL/L (ref 3.5–5)
RBC # BLD: 4.65 E12/L (ref 3.5–5.5)
SODIUM BLD-SCNC: 142 MMOL/L (ref 132–146)
TOTAL PROTEIN: 7.1 G/DL (ref 6.4–8.3)
WBC # BLD: 6.4 E9/L (ref 4.5–11.5)

## 2021-09-09 PROCEDURE — 36415 COLL VENOUS BLD VENIPUNCTURE: CPT

## 2021-09-09 PROCEDURE — 6370000000 HC RX 637 (ALT 250 FOR IP): Performed by: INTERNAL MEDICINE

## 2021-09-09 PROCEDURE — 6370000000 HC RX 637 (ALT 250 FOR IP): Performed by: NURSE PRACTITIONER

## 2021-09-09 PROCEDURE — 2580000003 HC RX 258: Performed by: NURSE PRACTITIONER

## 2021-09-09 PROCEDURE — 1200000000 HC SEMI PRIVATE

## 2021-09-09 PROCEDURE — 85025 COMPLETE CBC W/AUTO DIFF WBC: CPT

## 2021-09-09 PROCEDURE — 97530 THERAPEUTIC ACTIVITIES: CPT

## 2021-09-09 PROCEDURE — 80053 COMPREHEN METABOLIC PANEL: CPT

## 2021-09-09 PROCEDURE — G0378 HOSPITAL OBSERVATION PER HR: HCPCS

## 2021-09-09 RX ORDER — CHOLECALCIFEROL (VITAMIN D3) 50 MCG
2000 TABLET ORAL DAILY
Qty: 60 TABLET | Refills: 3 | Status: SHIPPED | OUTPATIENT
Start: 2021-09-10

## 2021-09-09 RX ORDER — AMLODIPINE BESYLATE 5 MG/1
5 TABLET ORAL DAILY
Qty: 30 TABLET | Refills: 3 | Status: SHIPPED | OUTPATIENT
Start: 2021-09-10

## 2021-09-09 RX ADMIN — VORTIOXETINE 5 MG: 10 TABLET, FILM COATED ORAL at 08:09

## 2021-09-09 RX ADMIN — ROPINIROLE HYDROCHLORIDE 1 MG: 1 TABLET, FILM COATED ORAL at 21:16

## 2021-09-09 RX ADMIN — DULOXETINE HYDROCHLORIDE 60 MG: 60 CAPSULE, DELAYED RELEASE ORAL at 21:16

## 2021-09-09 RX ADMIN — LEVOTHYROXINE SODIUM 100 MCG: 0.1 TABLET ORAL at 05:00

## 2021-09-09 RX ADMIN — ATORVASTATIN CALCIUM 10 MG: 10 TABLET, FILM COATED ORAL at 08:09

## 2021-09-09 RX ADMIN — RIVASTIGMINE TARTRATE 3 MG: 3 CAPSULE ORAL at 08:09

## 2021-09-09 RX ADMIN — ROPINIROLE HYDROCHLORIDE 1 MG: 1 TABLET, FILM COATED ORAL at 08:09

## 2021-09-09 RX ADMIN — DIVALPROEX SODIUM 125 MG: 125 CAPSULE, COATED PELLETS ORAL at 08:10

## 2021-09-09 RX ADMIN — ROPINIROLE HYDROCHLORIDE 1 MG: 1 TABLET, FILM COATED ORAL at 15:21

## 2021-09-09 RX ADMIN — RIVASTIGMINE TARTRATE 3 MG: 3 CAPSULE ORAL at 21:16

## 2021-09-09 RX ADMIN — TORSEMIDE 40 MG: 20 TABLET ORAL at 08:09

## 2021-09-09 RX ADMIN — DIVALPROEX SODIUM 125 MG: 125 CAPSULE, COATED PELLETS ORAL at 21:16

## 2021-09-09 RX ADMIN — ALLOPURINOL 100 MG: 100 TABLET ORAL at 08:10

## 2021-09-09 RX ADMIN — SODIUM CHLORIDE, PRESERVATIVE FREE 10 ML: 5 INJECTION INTRAVENOUS at 21:18

## 2021-09-09 RX ADMIN — AMLODIPINE BESYLATE 5 MG: 5 TABLET ORAL at 08:10

## 2021-09-09 RX ADMIN — SODIUM CHLORIDE, PRESERVATIVE FREE 10 ML: 5 INJECTION INTRAVENOUS at 08:11

## 2021-09-09 RX ADMIN — TOPIRAMATE 100 MG: 100 TABLET, FILM COATED ORAL at 21:16

## 2021-09-09 RX ADMIN — Medication 2000 UNITS: at 08:09

## 2021-09-09 RX ADMIN — RIVAROXABAN 20 MG: 20 TABLET, FILM COATED ORAL at 08:09

## 2021-09-09 RX ADMIN — GABAPENTIN 300 MG: 300 CAPSULE ORAL at 21:16

## 2021-09-09 ASSESSMENT — PAIN DESCRIPTION - PAIN TYPE: TYPE: ACUTE PAIN

## 2021-09-09 ASSESSMENT — PAIN SCALES - GENERAL
PAINLEVEL_OUTOF10: 0
PAINLEVEL_OUTOF10: 4
PAINLEVEL_OUTOF10: 0

## 2021-09-09 ASSESSMENT — PAIN DESCRIPTION - LOCATION: LOCATION: HIP

## 2021-09-09 ASSESSMENT — PAIN DESCRIPTION - PROGRESSION: CLINICAL_PROGRESSION: NOT CHANGED

## 2021-09-09 NOTE — PROGRESS NOTES
Subjective: The patient is awake and alert. No problems overnight. Denies chest pain, angina, and dyspnea. Denies abdominal pain. Tolerating diet. No nausea or vomiting.     Objective:    /65   Pulse 67   Temp 97.8 °F (36.6 °C) (Oral)   Resp 18   Ht 5' 6\" (1.676 m)   Wt 223 lb (101.2 kg)   SpO2 94%   Breastfeeding No   BMI 35.99 kg/m²   Head and Neck: normal atraumatic, no neck masses, normal thyroid, no jvd  Heart:  irregular rate and rhythm, S1, S2 normal, no murmur, click, rub or gallop  Lungs:  chest clear, no wheezing, rales, normal symmetric air entry, pulse oximetry on oxygen is 94%, no chest wall deformities or tenderness  Abd: soft, non-tender, without masses or organomegaly  Extrem:  No clubbing, cyanosis, positive edema  Neuro:Normal, no focal neurological deficit and DTRs Hypoactive    CBC:   Lab Results   Component Value Date    WBC 6.4 09/09/2021    RBC 4.65 09/09/2021    HGB 14.8 09/09/2021    HCT 46.0 09/09/2021    MCV 98.9 09/09/2021    MCH 31.8 09/09/2021    MCHC 32.2 09/09/2021    RDW 14.5 09/09/2021     09/09/2021    MPV 9.0 09/09/2021     CMP:    Lab Results   Component Value Date     09/09/2021    K 3.9 09/09/2021    K 4.8 09/06/2021     09/09/2021    CO2 27 09/09/2021    BUN 32 09/09/2021    CREATININE 1.5 09/09/2021    GFRAA 41 09/09/2021    LABGLOM 34 09/09/2021    GLUCOSE 102 09/09/2021    GLUCOSE 91 04/10/2012    PROT 7.1 09/09/2021    LABALBU 4.1 09/09/2021    LABALBU 4.1 04/10/2012    CALCIUM 9.8 09/09/2021    BILITOT 0.5 09/09/2021    ALKPHOS 98 09/09/2021    AST 12 09/09/2021    ALT 10 09/09/2021        Assessment:    Patient Active Problem List   Diagnosis Code    Essential hypertension I10    Coronary artery disease involving native coronary artery without angina pectoris I25.10    Depression F32.9    Dyslipidemia E78.5    Restless leg syndrome G25.81    Hypothyroidism E03.9    Peripheral edema R60.9    Chronic kidney disease, stage III (moderate) (HCC) N18.30    Morbid obesity (HCC) E66.01    Left upper extremity numbness R20.0    Lymphedema of both lower extremities I89.0    Chronic diastolic heart failure (HCC) I50.32    Paroxysmal atrial fibrillation (HCC) I48.0    First degree atrioventricular block I44.0    Anasarca R60.1    Prediabetes R73.03    Failure to thrive (0-17) R62.51    Cystitis N30.90    Vascular dementia ([facility] F01.50    Ataxia R27.0    Ambulatory dysfunction R26.2    Left knee pain M25.562         Plan:  Dc in     Christina Abdul MD  6:30 PM  9/9/2021

## 2021-09-09 NOTE — PROGRESS NOTES
Physical Therapy Treatment Note/Plan of Care    Room #:  6308/8728-92  Patient Name: Mariano James  YOB: 1945  MRN: [id number]    Date of Service: 9/9/2021     Tentative placement recommendation: Subacute vs Home Health Physical Therapy if patient meets goals  Equipment recommendation: None      Evaluating Physical Therapist: Liyah Roach PT, DPT #[id number]      Specific Provider Orders/Date/Referring Provider :   09/06/21 1815   PT evaluation and treat Start: 09/06/21 1815, End: 09/06/21 1815, ONE TIME, Standing Count: 1 Occurrences, R    Rony Bedolla, APRN - CNP Acknowledge New    Admitting Diagnosis:   Ataxia [R27.0]  Ambulatory dysfunction [R26.2]  Left knee pain, unspecified chronicity [M25.562]        Surgery: None    Patient Active Problem List   Diagnosis    Essential hypertension    Coronary artery disease involving native coronary artery without angina pectoris    Depression    Dyslipidemia    Restless leg syndrome    Hypothyroidism    Peripheral edema    Chronic kidney disease, stage III (moderate) ([address]    Morbid obesity ([address]    Left upper extremity numbness    Lymphedema of both lower extremities    Chronic diastolic heart failure (HCC)    Paroxysmal atrial fibrillation ([address]    First degree atrioventricular block    Anasarca    Prediabetes    Failure to thrive (0-17)    Cystitis    Vascular dementia ([address]    Ataxia    Ambulatory dysfunction    Left knee pain        ASSESSMENT of Current Deficits Patient exhibits decreased strength, balance and endurance impairing functional mobility, transfers, gait , gait distance and tolerance to activity. Pt performed all functional mobility very slowly especially ambulation and required multiple VC for safe use of [employer] approximation. Patient's right foot sliding across the floor intermittently during ambulation with cueing for increased hip/knee flexion and dorsiflexion to improve swing phase of gait.  Pt has increased risk for falls due to above mentioned deficits.       PHYSICAL THERAPY  PLAN OF CARE       Physical therapy plan of care is established based on physician order,  patient diagnosis and clinical assessment    Current Treatment Recommendations:    -Bed Mobility: Lower extremity exercises  and Trunk control activities   -Sitting Balance: Incorporate reaching activities to activate trunk muscles , Hands on support to maintain midline , Facilitate active trunk muscle engagement , Facilitate postural control in all planes  and Engage in core activities to allow for movement within base of support   -Standing Balance: Perform strengthening exercises in standing to promote motor control with or without upper extremity support , Instruct patient on adequate base of support to maintain balance and Challenge balance utilizing reaching  activities beyond center of gravity    -Transfers: Provide instruction on proper hand and foot position for adequate transfer of weight onto lower extremities and use of gait device, Cues for hand placement, technique and safety, Facilitate weight shift forward on to lower extremities and provide necessary stabilization of bilateral lower extremities , Support transfer of weight on to lower extremities, Assist with extension of knees trunk and hip to accept weight transfer  and Provide stabilization to prevent fall   -Gait: Gait training, Standing activities to improve: base of support, weight shift, weight bearing , Exercises to improve trunk control, Exercises to improve hip and knee control, Performance of protected weight bearing activities and Activities to increase weight bearing   -Endurance: Utilize Supervised activities to increase level of endurance to allow for safe functional mobility including transfers and gait  and Use graduated activities to promote good breathing techniques and provide support and education to maximize respiratory function    PT long term treatment goals are located in below grid    Patient and or family understand(s) diagnosis, prognosis, and plan of care. Frequency of treatments: Patient will be seen  daily. Prior Level of Function: Patient ambulated with wheeled walker   Rehab Potential: good  for baseline    Past medical history:   Past Medical History:   Diagnosis Date    Acute bronchitis 4/27/2015    Acute on chronic diastolic (congestive) heart failure ([address] 7/18/2017    Acute renal failure (HCC)     Acute renal failure with acute cortical necrosis ([address] 12/29/2013    Acute-on-chronic kidney injury ([address] 1/19/2014    Arthritis     Atrial fibrillation (HCC)     CAD (coronary artery disease)     stent x 3    Cancer ([address]     thyroid and skin    CHF (congestive heart failure) ([address]     Echo 1/20/14 EF 23% stage I diastolic    Dementia ([address]     Depression     Diabetes mellitus ([address]     Enteritis 9/12/2014    H/O echocardiogram 4/10/15    EF 72% stage I diastolic    History of blood transfusion     History of cardiovascular stress test 1/20/14    Lexiscan    History of cardiovascular stress test 11/20/2017    Lexiscan stress test    History of echocardiogram 03/19/2016    EF 70-75%, There is doppler evidence of stage I diastolic dysfunction    History of echocardiogram 12/15/2017    EF 70%    Hyperkalemia 1/1/2014    Hyperlipidemia     Hypertension     Hyperuricemia without signs inflammatory arthritis/tophaceous disease     Hypothyroidism     Lymphedema     Mood and affect disturbance     Restless leg syndrome     Stroke [facility])     pt stated 6 strokes    Unspecified cerebral artery occlusion with cerebral infarction     Unstable angina ([address] 1/19/2014     Past Surgical History:   Procedure Laterality Date    CARDIAC SURGERY      CHOLECYSTECTOMY      DIAGNOSTIC CARDIAC CATH LAB PROCEDURE  09/10/2009    LV 70%. stenotic CAD. Previously placed BM stent: LAD-patent.  Successful GUILLERMO of LAD: mid   [address] CATH LAB PROCEDURE  10/11/2007    LVF well preserved, EF 70%. No MR. Patent coronary arteries. Patent stented segments in LAD.  DIAGNOSTIC CARDIAC CATH LAB PROCEDURE  09/27/2006    Patent stents in proximal and mid LAD with mild instent stenosis. Mild CAD of mid LAD 30% stenosis. Normal LVSF, EF 60%.  DIAGNOSTIC CARDIAC CATH LAB PROCEDURE  05/23/2003    EF and contraction pattern normal. No MR. Normal LVF. Atherosclerotic CAD.  ECHO COMPLETE  1/20/2014 EF62%    stage I diastolic    GALLBLADDER SURGERY      HERNIA REPAIR      HIATAL HERNIA REPAIR      HYSTERECTOMY      JOINT REPLACEMENT Bilateral     knee    THYROID SURGERY      THYROIDECTOMY         SUBJECTIVE:    Precautions: Up with assistance, falls   Social history: Patient at [facility] with  Walk in shower grab bars    Equipment owned: Wheelchair, [employer] and [employer] chair    [address]   17/24    AM-[facility] Mobility Inpatient   How much difficulty turning over in bed?: A Little  How much difficulty sitting down on / standing up from a chair with arms?: A Little  How much difficulty moving from lying on back to sitting on side of bed?: A Little  How much help from another person moving to and from a bed to a chair?: A Little  How much help from another person needed to walk in hospital room?: A Little  How much help from another person for climbing 3-5 steps with a railing?: A Lot  AM-PAC Inpatient Mobility Raw Score : 17  AM-PAC Inpatient T-Scale Score : 42.13  Mobility Inpatient CMS 0-100% Score: 50.57  Mobility Inpatient CMS G-Code Modifier : CK    Nursing cleared patient for PT treatment. .   OBJECTIVE;   Initial Evaluation  Date: 9/7/2021 Treatment Date:  9/9/2021       Short Term/ Long Term   Goals   Was pt agreeable to Eval/treatment?  Yes yes To be met in 3 days   Pain level   0/10   No number given  Both  legs    Bed Mobility    Rolling: Supervision     Supine to sit: Supervision     Sit to supine: Supervision     Scooting: requires further education in this area   Yes Partial Yes      Treatment:  Patient practiced and was instructed/facilitated in the following treatment: Patient  Sat edge of bed 8 minutes with Supervision  to increase dynamic sitting balance and activity tolerance. Pt performed bed mobility, transfers, ambulated in the hallway, and  returned to bed. Therapeutic Exercises:  not performed      At end of session, patient in bed with call light and phone within reach,  all lines and tubes intact, nursing notified. Patient would benefit from continued skilled Physical Therapy to improve functional independence and quality of life. Patient's/ family goals   Go home with her daughter    Time in 3:53  Time out  4:10    Total Treatment Time  17 minutes        CPT codes:    Therapeutic activities (82356)   17 minutes  1 unit(s)   Soledad Hung  [facility]  LIC # [id number]

## 2021-09-09 NOTE — PROGRESS NOTES
OT BEDSIDE TREATMENT NOTE      Date:2021  Patient Name: Janet Wang  MRN: [id number]  : 1945  Room: [address] [place] [address]     Evaluating OT: Kay Johnson OTR/L; [id number]      Referring Provider and Specific Provider Orders/Date:      21   OT eval and treat Start: 21, End: 21, ONE TIME, Standing Count: 1 Occurrences, R      Devin Lange, APRN - CNP      Placement Recommendation: Subacute         Diagnosis:   1. Ataxia    2. Left knee pain, unspecified chronicity    3.  Ambulatory dysfunction         Surgery: none        Pertinent Medical History:       Past Medical History        Past Medical History:   Diagnosis Date    Acute bronchitis 2015    Acute on chronic diastolic (congestive) heart failure ([address] 2017    Acute renal failure (HCC)      Acute renal failure with acute cortical necrosis ([address] 2013    Acute-on-chronic kidney injury ([address] 2014    Arthritis      Atrial fibrillation (HCC)      CAD (coronary artery disease)       stent x 3    Cancer ([address]       thyroid and skin    CHF (congestive heart failure) (HCC)       Echo 14 EF 10% stage I diastolic    Dementia ([address]      Depression      Diabetes mellitus ([address]      Enteritis 2014    H/O echocardiogram 4/10/15     EF 82% stage I diastolic    History of blood transfusion      History of cardiovascular stress test 14     Lexiscan    History of cardiovascular stress test 2017     Lexiscan stress test    History of echocardiogram 2016     EF 70-75%, There is doppler evidence of stage I diastolic dysfunction    History of echocardiogram 12/15/2017     EF 70%    Hyperkalemia 2014    Hyperlipidemia      Hypertension      Hyperuricemia without signs inflammatory arthritis/tophaceous disease      Hypothyroidism      Lymphedema      Mood and affect disturbance      Restless leg syndrome      Stroke [facility])       pt stated 6 strokes    Unspecified cerebral artery occlusion with cerebral infarction      Unstable angina ([facility] 1/19/2014            Past Surgical History         Past Surgical History:   Procedure Laterality Date    CARDIAC SURGERY        CHOLECYSTECTOMY        DIAGNOSTIC CARDIAC CATH LAB PROCEDURE   09/10/2009     LV 70%. stenotic CAD. Previously placed BM stent: LAD-patent. Successful GUILLERMO of LAD: mid    DIAGNOSTIC CARDIAC CATH LAB PROCEDURE   10/11/2007     LVF well preserved, EF 70%. No MR. Patent coronary arteries. Patent stented segments in LAD.  DIAGNOSTIC CARDIAC CATH LAB PROCEDURE   09/27/2006     Patent stents in proximal and mid LAD with mild instent stenosis. Mild CAD of mid LAD 30% stenosis. Normal LVSF, EF 60%.  DIAGNOSTIC CARDIAC CATH LAB PROCEDURE   05/23/2003     EF and contraction pattern normal. No MR. Normal LVF. Atherosclerotic CAD.  ECHO COMPLETE   1/20/2014 EF62%     stage I diastolic    GALLBLADDER SURGERY        HERNIA REPAIR        HIATAL HERNIA REPAIR        HYSTERECTOMY        JOINT REPLACEMENT Bilateral       knee    THYROID SURGERY        THYROIDECTOMY              Precautions:  Fall Risk, had recent falls at the [address], hx of CVA        Assessment of current deficits    [x]? Functional mobility            [x]?ADLs           [x]? Strength                   []?Cognition    [x]? Functional transfers          [x]? IADLs         []? Safety Awareness   [x]? Endurance    []? Fine Coordination              [x]? Balance      []? Vision/perception    []? Sensation      []? Gross Motor Coordination  []? ROM           []?  Delirium                   []? Motor Control      OT PLAN OF CARE   OT POC based on physician orders, patient diagnosis and results of clinical assessment     Frequency/Duration 1-3 days/wk for 2 weeks PRN      Specific OT Treatment Interventions to include:   * Instruction/training on adapted ADL techniques and AE recommendations to increase functional independence within precautions       * Training on energy conservation strategies, correct breathing pattern and techniques to improve independence/tolerance for self-care routine  * Functional transfer/mobility training/DME recommendations for increased independence, safety, and fall prevention  * Patient/Family education to increase follow through with safety techniques and functional independence  * Recommendation of environmental modifications for increased safety with functional transfers/mobility and ADLs  * Therapeutic exercise to improve motor endurance, ROM, and functional strength for ADLs/functional transfers  * Therapeutic activities to facilitate/challenge dynamic balance, stand tolerance for increased safety and independence with ADLs  * Positioning to improve skin integrity, interaction with environment and functional independence     Recommended Adaptive Equipment: none       Home Living: Pt came to us from [address] owned: wheeled walker, cane, wheelchair, shower chair, hospital bed      Prior Level of Function: Needs assistance with ADLs and IADLs; ambulated wheeled walker, had recent falls at the [facility]     Driving: no      Pain Level: no pain at time of evaluation; Nursing notified.       Cognition: A&O: 3/4; Follows 1 step directions              Memory: fair               Sequencing: fair               Problem solving: fair               Judgement/safety: fair      [facility]   AM-PAC Daily Activity Inpatient   How much help for putting on and taking off regular lower body clothing?: A Lot  How much help for Bathing?: A Lot  How much help for Toileting?: A Lot  How much help for putting on and taking off regular upper body clothing?: A Little  How much help for taking care of personal grooming?: A Little  How much help for eating meals?: None  AM-[facility] Inpatient Daily Activity Raw Score: 16  AM-PAC Inpatient ADL T-Scale Score : 35.96  ADL Inpatient CMS 0-100% Score: 53.32  ADL Inpatient CMS G-Code Modifier : CK                Functional Assessment:     Initial Eval Status  Date: 9/7/21 Treatment Status  Date: 9/9/2021 STGs = LTGs  Time frame: 10-14 days   Feeding Independent  N/T  Independent    Grooming Minimal Assist  N/T Independent    UB Dressing Minimal Assist  Mod A to don outer gown when pt seated EOB Independent    LB Dressing Maximal Assist  Max A to don socks when seated in bed Modified [place]    Bathing Moderate Assist  N/T; pt states she had assist for bathing earlier in day  Supervision    Toileting Moderate Assist   Mod A; assist for hygiene d/t slight incontinence of bowel Supervision    Bed Mobility  Supine to sit: Supervision   Sit to supine: Supervision  Supervision for supine to sit; supervision for scooting; sit to supine N/T as pt seated in chair with alarm on; nsg aware  Supine to sit: Independent   Sit to supine: Independent    Functional Transfers Supervision from EOB to wheeled walker  Minimal Assist for transfer to and from low commode with minimal verbal cues to use grab bar for safe commode transfer. Transfer training with verbal cues for hand placement throughout session to improve safety.   Min A for sit to stand to/from EOB and to/ chair; pt demo's decreased safety awareness, as pt wanted to remain standing as tx retrieved mask for pt; cuing for hand placement   Independent    Functional Mobility Minimal Assist with wheeled walker to improve balance to and from bathroom, verbal cues for walker sequence and safety.   Min A with intermittent mod A with s/w for household distances in room and for ~10 ft in hallway; pt c/o dizziness and states she wanted to keep walking; cuing for safety; pt demo's fatigue; pt states she will walk until she falls, but she will let therapist know when she is going to fall Modified [place]    Balance Sitting:     Static: good      Dynamic: fair   Standing: fair  with wheeled walker Sitting:     Static: good Dynamic: fair   Standing: fair/fair minus with wheeled walker      Activity Tolerance fair  fair  good    Visual/  Perceptual Glasses: yes                        Hand Dominance: right    Hearing: WFL   Sensation:  No c/o numbness or tingling  Tone: WFL   Edema: yes, B LE's (sleeves donned)       Comments: Patient cleared by nursing staff. Upon arrival pt supine in bed. Pt agreeable to OT tx session. Pt educated with regards to bed mobility, hand placement, safety awareness, static sitting balance,  standing balance, transfer training, functional mobility, ADL retraining,  LE/UE dressing, toileting/hygiene, ECT's. At end of session pt seated in chair  with all lines and tubes intact, call light within reach. Overall, pt demonstrated decreased independence and safety awareness during completion of ADL/functional transfers/mobility tasks. Pt states, \"I will walk until I'm going to fall and then I will let you know\". Pt would benefit from continued skilled OT to increase safety and independence with completion of ADL/IADL tasks for functional independence and quality of life. Pt states , \"please don't send me back to [facility]. I want to go to my daughter's house. It was my fault\". SW notified. Pt required cues and education as noted above for safe facilitation and completion of tasks. Therapist provided skilled monitoring of patient's response during treatment session. Prior to and at the end of session, environmental modifications completed for patients safety and efficiency of treatment session. Overall, patient demonstrates moderate difficulties with completion of BADLs and IADLs. Factors contributing to these difficulties include bowel incontinence, decreased endurance, and generalized weakness. As noted above, patient likely to benefit from further OT intervention to increase independence, safety, and overall quality of life.      Treatment:     ? Bed mobility: Facilitated bed mobility with cues for proper body mechanics and sequencing to prepare for ADL completion. ? Functional transfers: Facilitated transfers from various surfaces with cues for body alignment, safety and hand placement. ? ADL completion: Self-care retraining for the above-mentioned ADLs; training on proper hand placement, safety technique, sequencing, and energy conservation techniques. ? Postural Balance: Sitting/standing balance retraining to improve righting reactions with postural changes during ADLs. · Pt has made decreased progress towards set goals   ·  OT 1-3x/week for 5-7 days during hospitalization       Treatment Time also includes thorough review of current medical information, gathering information on past medical history/social history and prior level of function, informal observation of tasks, assessment of data and education on plan of care and goals.     Treatment Time In: 1:25 PM     Treatment Time Out: 1:56 PM            Treatment Charges: Mins Units   ADL/Home Mgt     [id number] 7 0   Thera Activities     24094 24 2   Ther Ex                 59577     Manual Therapy    01234     Neuro Re-ed         64675     Orthotic manage/training                               95236     Non Billable Time     Total Timed Treatment 31 [address] Garcia/DARCI #[id number]

## 2021-09-09 NOTE — CARE COORDINATION
Ss note:9/9/2021.1:09 PM notified that insurance is requesting a Peer to Peer for [place] acute rehab by tomorrow 9- by 9 am or insurance will deny acute rehab. Number for Peer to Peer is [phone] option 5. Pt has Legal Guardian Gopal Ford. Pt is from [facility] and liaison relays if unable to go to [facility] pt can return to [facility] with [facility] however sw would need to speak with Legal guardian for direction of care plan.  CHERY Rodrigues

## 2021-09-10 VITALS
HEART RATE: 60 BPM | WEIGHT: 225.9 LBS | SYSTOLIC BLOOD PRESSURE: 151 MMHG | RESPIRATION RATE: 18 BRPM | DIASTOLIC BLOOD PRESSURE: 76 MMHG | HEIGHT: 66 IN | TEMPERATURE: 97.8 F | BODY MASS INDEX: 36.31 KG/M2 | OXYGEN SATURATION: 94 %

## 2021-09-10 LAB — SARS-COV-2, PCR: NOT DETECTED

## 2021-09-10 PROCEDURE — 2580000003 HC RX 258: Performed by: NURSE PRACTITIONER

## 2021-09-10 PROCEDURE — 97530 THERAPEUTIC ACTIVITIES: CPT

## 2021-09-10 PROCEDURE — 6370000000 HC RX 637 (ALT 250 FOR IP): Performed by: NURSE PRACTITIONER

## 2021-09-10 PROCEDURE — 6370000000 HC RX 637 (ALT 250 FOR IP): Performed by: INTERNAL MEDICINE

## 2021-09-10 PROCEDURE — G0378 HOSPITAL OBSERVATION PER HR: HCPCS

## 2021-09-10 RX ADMIN — RIVAROXABAN 20 MG: 20 TABLET, FILM COATED ORAL at 07:49

## 2021-09-10 RX ADMIN — VORTIOXETINE 5 MG: 10 TABLET, FILM COATED ORAL at 07:49

## 2021-09-10 RX ADMIN — ROPINIROLE HYDROCHLORIDE 1 MG: 1 TABLET, FILM COATED ORAL at 07:49

## 2021-09-10 RX ADMIN — ATORVASTATIN CALCIUM 10 MG: 10 TABLET, FILM COATED ORAL at 07:49

## 2021-09-10 RX ADMIN — AMLODIPINE BESYLATE 5 MG: 5 TABLET ORAL at 07:49

## 2021-09-10 RX ADMIN — DIVALPROEX SODIUM 125 MG: 125 CAPSULE, COATED PELLETS ORAL at 07:49

## 2021-09-10 RX ADMIN — ALLOPURINOL 100 MG: 100 TABLET ORAL at 07:49

## 2021-09-10 RX ADMIN — LEVOTHYROXINE SODIUM 100 MCG: 0.1 TABLET ORAL at 06:43

## 2021-09-10 RX ADMIN — Medication 2000 UNITS: at 07:49

## 2021-09-10 RX ADMIN — TORSEMIDE 40 MG: 20 TABLET ORAL at 07:49

## 2021-09-10 RX ADMIN — RIVASTIGMINE TARTRATE 3 MG: 3 CAPSULE ORAL at 07:49

## 2021-09-10 RX ADMIN — SODIUM CHLORIDE, PRESERVATIVE FREE 10 ML: 5 INJECTION INTRAVENOUS at 07:48

## 2021-09-10 ASSESSMENT — PAIN SCALES - GENERAL: PAINLEVEL_OUTOF10: 0

## 2021-09-10 ASSESSMENT — PAIN DESCRIPTION - PROGRESSION: CLINICAL_PROGRESSION: NOT CHANGED

## 2021-09-10 NOTE — DISCHARGE SUMMARY
Physician Discharge Summary     Patient ID:  Daryle Axe  [id number]  62 y.o.  1945    Admit date: 9/6/2021    Discharge date and time: 9/10/2021    Admission Diagnoses: Principal Problem:    Ataxia  Active Problems:    Restless leg syndrome    Hypothyroidism    Lymphedema of both lower extremities    Paroxysmal atrial fibrillation (HCC)    Vascular dementia ([facility]    Ambulatory dysfunction    Left knee pain  Resolved Problems:    * No resolved hospital problems. *      Discharge Diagnoses: Gait instability otherwise as above on admission diagnoses    Consults: none      Hospital Course: Patient is admitted with altered mental status and decreased ambulation patient is known to have history of gait disorder and inability to ambulate she has been living in assisted living she is not happy there and does not want to go back PT and OT has been involved with patient care trying to get patient to go to [facility] for rehabilitation failed patient wants to go back home with her daughter does not seem that the daughter is able to care for her enough. Patient has legal guardian social service discussed the case with the legal guardian who agreed to return the patient back to assisted living patient was discharged back there    BP (!) 151/76   Pulse 60   Temp 97.8 °F (36.6 °C) (Oral)   Resp 18   Ht 5' 6\" (1.676 m)   Wt 225 lb 14.4 oz (102.5 kg)   SpO2 94%   Breastfeeding No   BMI 36.46 kg/m²   As in last progress note    Condition on discharge: Stable    Disposition: Assisted living    Patient Instructions:   Discharge Medication List as of 9/10/2021 10:50 AM      START taking these medications    Details   amLODIPine (NORVASC) 5 MG tablet Take 1 tablet by mouth daily, Disp-30 tablet, R-3Normal      Vitamin D (CHOLECALCIFEROL) 50 MCG (2000 UT) TABS tablet Take 1 tablet by mouth daily, Disp-60 tablet, R-3Labeling may look different. 25 mcg=1000 Units. Please double check dosages. Normal CONTINUE these medications which have NOT CHANGED    Details   !! VORTIoxetine (TRINTELLIX) 5 MG tablet Take 5 mg by mouth dailyHistorical Med      magnesium hydroxide (MILK OF MAGNESIA) 400 MG/5ML suspension Take 30 mLs by mouth daily as needed for ConstipationHistorical Med      levothyroxine (SYNTHROID) 100 MCG tablet Take 100 mcg by mouth DailyHistorical Med      !! VORTIoxetine HBr (TRINTELLIX) 20 MG TABS tablet Take 10 mg by mouth dailyHistorical Med      diclofenac sodium (VOLTAREN) 1 % GEL Apply 2 g topically 2 times daily For knee pain, Topical, 2 TIMES DAILY, Historical Med      DULoxetine (CYMBALTA) 60 MG extended release capsule Take 60 mg by mouth nightlyHistorical Med      divalproex (DEPAKOTE SPRINKLE) 125 MG capsule Take 125 mg by mouth 2 times dailyHistorical Med      rivastigmine (EXELON) 3 MG capsule Take 3 mg by mouth 2 times dailyHistorical Med      gabapentin (NEURONTIN) 300 MG capsule Take 300 mg by mouth nightly. Historical Med      rOPINIRole (REQUIP) 1 MG tablet Take 1 mg by mouth 3 times dailyHistorical Med      torsemide (DEMADEX) 20 MG tablet Take 40 mg by mouth dailyHistorical Med      acetaminophen (TYLENOL) 325 MG tablet Take 650 mg by mouth every 4 hours as needed for PainHistorical Med      atorvastatin (LIPITOR) 10 MG tablet Take 10 mg by mouth dailyHistorical Med      buPROPion (WELLBUTRIN XL) 150 MG extended release tablet Take 150 mg by mouth every morningHistorical Med      topiramate (TOPAMAX) 100 MG tablet Take 100 mg by mouth nightlyHistorical Med      rivaroxaban (XARELTO) 20 MG TABS tablet Take 20 mg by mouth daily (with breakfast)Historical Med      FLUoxetine (PROZAC) 10 MG capsule Take 20 mg by mouth daily Historical Med      allopurinol (ZYLOPRIM) 100 MG tablet Take 100 mg by mouth dailyHistorical Med      Glucose Blood (BLOOD GLUCOSE TEST STRIPS) STRP For use with glucometer, Disp-100 strip, R-3Normal       !! - Potential duplicate medications found.  Please discuss with provider. STOP taking these medications       metoprolol succinate (TOPROL XL) 50 MG extended release tablet Comments:   Reason for Stopping:             Activity: activity as tolerated  Diet: regular diet    Follow-up with PCP in 2 weeks.     Note that over 30 minutes was spent in preparing discharge papers, discussing discharge with patient, medication review, etc.      Electronically signed by Mariola Kapoor MD on 9/10/2021 at 2:15 PM

## 2021-09-10 NOTE — CARE COORDINATION
Addendum: 9/10/2021.10:23 AM Discharge order noted. Spoke with Legal Guardian Tracy Balbuena, she is in agreement with pt returning to [facility] with [facility] today. Orders noted and pt accepted with [address] [place] [address] Dr yost [facility] for PT/OT. Liaison arranged Valor Ambulette transfer today at noon. Nursing aware, pt aware. CHERY George    Ss note: 9/10/2021.9:25 AM Discharge order noted. Neg covid on 09-. Per IDR physician is not doing a peer to peer for [facility], therefore insurance denies acute rehab. Pt is from [facility].  has call out to pts Legal Guardian Tracy Balbuena at [phone] requesting a return call to confirm pt to return to [facility] with [facility]. Will need [facility] order if Legal Guardian is in agreement with return to [place].  CHERY George

## 2021-09-10 NOTE — DISCHARGE INSTR - COC
Continuity of Care Form    Patient Name: Mariano James   :    MRN:  [id number]    Admit date:  2021  Discharge date:  ***    Code Status Order: DNR-CCA   Advance Directives:     Admitting Physician:  Elian Rios MD  PCP: Annette Velasuqez MD    Discharging Nurse: [facility] Unit/Room#: 8119/3501-86  Discharging Unit Phone Number: ***    Emergency Contact:   Extended Emergency Contact Information  Primary Emergency Contact: Sandhya Drain of [address] [place] [address] Phone: [phone]  Relation: Child  Secondary Emergency Contact: [facility] Phone: [phone]  Relation: Legal Guardian   needed? No    Past Surgical History:  Past Surgical History:   Procedure Laterality Date    CARDIAC SURGERY      CHOLECYSTECTOMY      DIAGNOSTIC CARDIAC CATH LAB PROCEDURE  09/10/2009    LV 70%. stenotic CAD. Previously placed BM stent: LAD-patent. Successful GUILLERMO of LAD: mid    DIAGNOSTIC CARDIAC CATH LAB PROCEDURE  10/11/2007    LVF well preserved, EF 70%. No MR. Patent coronary arteries. Patent stented segments in LAD.  DIAGNOSTIC CARDIAC CATH LAB PROCEDURE  2006    Patent stents in proximal and mid LAD with mild instent stenosis. Mild CAD of mid LAD 30% stenosis. Normal LVSF, EF 60%.  DIAGNOSTIC CARDIAC CATH LAB PROCEDURE  2003    EF and contraction pattern normal. No MR. Normal LVF. Atherosclerotic CAD.      ECHO COMPLETE  2014 EF62%    stage I diastolic    GALLBLADDER SURGERY      HERNIA REPAIR      HIATAL HERNIA REPAIR      HYSTERECTOMY      JOINT REPLACEMENT Bilateral     knee    THYROID SURGERY      THYROIDECTOMY         Immunization History:   Immunization History   Administered Date(s) Administered    Influenza Vaccine, unspecified formulation 2015    Influenza, Quadv, IM, PF (6 mo and older Fluzone, Flulaval, Fluarix, and 3 yrs and older Afluria) 2017    Pneumococcal Conjugate 13-valent ([facility]) 2019  Pneumococcal Polysaccharide ([id number]) 04/15/2016       Active Problems:  Patient Active Problem List   Diagnosis Code    Essential hypertension I10    Coronary artery disease involving native coronary artery without angina pectoris I25.10    Depression F32.9    Dyslipidemia E78.5    Restless leg syndrome G25.81    Hypothyroidism E03.9    Peripheral edema R60.9    Chronic kidney disease, stage III (moderate) ([facility]) N18.30    Morbid obesity ([facility] E66.01    Left upper extremity numbness R20.0    Lymphedema of both lower extremities I89.0    Chronic diastolic heart failure ([facility]) I50.32    Paroxysmal atrial fibrillation ([facility] I48.0    First degree atrioventricular block I44.0    Anasarca R60.1    Prediabetes R73.03    Failure to thrive (0-17) R62.51    Cystitis N30.90    Vascular dementia ([facility]) F01.50    Ataxia R27.0    Ambulatory dysfunction R26.2    Left knee pain M25.562       Isolation/Infection:   Isolation          No Isolation        Patient Infection Status     Infection Onset Added Last Indicated Last Indicated By Review Planned Expiration Resolved Resolved By    COVID-19 Rule Out 09/08/21 09/08/21 09/08/21 COVID-19 (Ordered) 09/15/21 09/22/21      Resolved    COVID-19 Rule Out 05/05/20 05/05/20 05/05/20 COVID-19 (Ordered)   05/05/20 Rule-Out Test Resulted          Nurse Assessment:  Last Vital Signs: BP (!) 151/76   Pulse 60   Temp 97.8 °F (36.6 °C) (Oral)   Resp 18   Ht 5' 6\" (1.676 m)   Wt 225 lb 14.4 oz (102.5 kg)   SpO2 94%   Breastfeeding No   BMI 36.46 kg/m²     Last documented pain score (0-10 scale): Pain Level: 0  Last Weight:   Wt Readings from Last 1 Encounters:   09/10/21 225 lb 14.4 oz (102.5 kg)     Mental Status:  oriented and alert    IV Access:  - None    Nursing Mobility/ADLs:  Walking   Assisted  Transfer  Assisted  Bathing  Assisted  Dressing  Assisted  Toileting  Assisted  Feeding  Independent  Med Admin  Assisted  Med Delivery   whole    Wound Care Documentation and Therapy:        Elimination:  Continence:   · Bowel: Yes  · Bladder: Yes  Urinary Catheter: None   Colostomy/Ileostomy/Ileal Conduit: No       Date of Last BM: 9/9/21    Intake/Output Summary (Last 24 hours) at 9/10/2021 1044  Last data filed at 9/10/2021 0903  Gross per 24 hour   Intake 780 ml   Output 1275 ml   Net -495 ml     I/O last 3 completed shifts: In: 5 [P.O.:540]  Out: 1700 [Urine:1700]    Safety Concerns:     History of Falls (last 30 days)    Impairments/Disabilities:      weakness lower ext    Nutrition Therapy:  Current Nutrition Therapy:   - Oral Diet:  General and Low Sodium (2gm)    Routes of Feeding: Oral  Liquids: Thin Liquids  Daily Fluid Restriction: no  Last Modified Barium Swallow with Video (Video Swallowing Test): not done    Treatments at the Time of Hospital Discharge:   Respiratory Treatments:     Oxygen Therapy:  is not on home oxygen therapy.   Ventilator:    - No ventilator support    Rehab Therapies: Physical Therapy and Occupational Therapy  Weight Bearing Status/Restrictions: No weight bearing restirctions  Other Medical Equipment (for information only, NOT a DME order):  wheelchair and walker  Other Treatments: ***    Patient's personal belongings (please select all that are sent with patient):  Dentures upper and lower    RN SIGNATURE:  Electronically signed by No Corley RN on 9/10/21 at 10:49 AM EDT    CASE MANAGEMENT/SOCIAL WORK SECTION    Inpatient Status Date: ***    Readmission Risk Assessment Score:  Readmission Risk              Risk of Unplanned Readmission:  16           Discharging to Facility/ Agency   · Name:   · Address:  · Phone:  · Fax:    Dialysis Facility (if applicable)   · Name:  · Address:  · Dialysis Schedule:  · Phone:  · Fax:    / signature: {Esignature:[id number]}    PHYSICIAN SECTION    Prognosis: {Prognosis:9539475200}    Condition at Discharge: [address] [place] [address] Patient Condition:[id number]}    Rehab Potential (if transferring to Rehab): {Prognosis:2176902728}    Recommended Labs or Other Treatments After Discharge: ***    Physician Certification: I certify the above information and transfer of Ariane Angel  is necessary for the continuing treatment of the diagnosis listed and that she requires {Admit to Appropriate Level of Care:82472} for {GREATER/LESS:069364819} 30 days.      Update Admission H&P: {CHP DME Changes in QACFN:855674014}    PHYSICIAN SIGNATURE:  {Esignature:[id number]}

## 2021-09-10 NOTE — PROGRESS NOTES
Physical Therapy Treatment Note/Plan of Care    Room #:  2775/3976-97  Patient Name: Faviola Manzo  YOB: 1945  MRN: [id number]    Date of Service: 9/10/2021     Tentative placement recommendation: Subacute vs Home Health Physical Therapy if patient meets goals  Equipment recommendation: None      Evaluating Physical Therapist: Penny Wood PT, DPT #[id number]      Specific Provider Orders/Date/Referring Provider :   09/06/21 1815   PT evaluation and treat Start: 09/06/21 1815, End: 09/06/21 1815, ONE TIME, Standing Count: 1 Occurrences, R    Caridad Morejon, APRN - CNP Acknowledge New    Admitting Diagnosis:   Ataxia [R27.0]  Ambulatory dysfunction [R26.2]  Left knee pain, unspecified chronicity [M25.562]        Surgery: None    Patient Active Problem List   Diagnosis    Essential hypertension    Coronary artery disease involving native coronary artery without angina pectoris    Depression    Dyslipidemia    Restless leg syndrome    Hypothyroidism    Peripheral edema    Chronic kidney disease, stage III (moderate) ([address]    Morbid obesity ([address]    Left upper extremity numbness    Lymphedema of both lower extremities    Chronic diastolic heart failure (HCC)    Paroxysmal atrial fibrillation ([address]    First degree atrioventricular block    Anasarca    Prediabetes    Failure to thrive (0-17)    Cystitis    Vascular dementia ([address]    Ataxia    Ambulatory dysfunction    Left knee pain        ASSESSMENT of Current Deficits Patient exhibits decreased strength, balance and endurance impairing functional mobility, transfers, gait , gait distance and tolerance to activity. Pt displayed a slow estephanie during ambulation and required multiple VC for safe use of [employer] approximation. Patient's right foot sliding across the floor intermittently during ambulation with cueing for increased hip/knee flexion and dorsiflexion to improve swing phase of gait.  Pt has increased risk for falls due to above mentioned deficits.       PHYSICAL THERAPY  PLAN OF CARE       Physical therapy plan of care is established based on physician order,  patient diagnosis and clinical assessment    Current Treatment Recommendations:    -Bed Mobility: Lower extremity exercises  and Trunk control activities   -Sitting Balance: Incorporate reaching activities to activate trunk muscles , Hands on support to maintain midline , Facilitate active trunk muscle engagement , Facilitate postural control in all planes  and Engage in core activities to allow for movement within base of support   -Standing Balance: Perform strengthening exercises in standing to promote motor control with or without upper extremity support , Instruct patient on adequate base of support to maintain balance and Challenge balance utilizing reaching  activities beyond center of gravity    -Transfers: Provide instruction on proper hand and foot position for adequate transfer of weight onto lower extremities and use of gait device, Cues for hand placement, technique and safety, Facilitate weight shift forward on to lower extremities and provide necessary stabilization of bilateral lower extremities , Support transfer of weight on to lower extremities, Assist with extension of knees trunk and hip to accept weight transfer  and Provide stabilization to prevent fall   -Gait: Gait training, Standing activities to improve: base of support, weight shift, weight bearing , Exercises to improve trunk control, Exercises to improve hip and knee control, Performance of protected weight bearing activities and Activities to increase weight bearing   -Endurance: Utilize Supervised activities to increase level of endurance to allow for safe functional mobility including transfers and gait  and Use graduated activities to promote good breathing techniques and provide support and education to maximize respiratory function    PT long term treatment goals are located in below Rolling: Not assessed    Supine to sit: Not assessed    Sit to supine: Not assessed    Scooting: Not assessed     Rolling: Independent    Supine to sit: Independent    Sit to supine: Independent    Scooting: Independent     Transfers Sit to stand: Minimal assist of 1  Sit to stand: Minimal assist of 1 Cues for hand placement and safety       Sit to stand: Independent    Ambulation    40 feet using  wheeled walker with Minimal assist of 1   for walker control, balance and safety 2 x 35 feet using  wheeled walker with Minimal assist of 1   with cues for upright posture, walker approximation, increased base of support, increased step length, safety, pacing and increased right hip/knee flexion for swing phase of gait    > 100 feet using  wheeled walker with Modified Independent    Stair negotiation: ascended and descended   Not assessed      ROM Within functional limits    Increase range of motion 10% of affected joints    Strength BUE:  refer to OT eval  RLE:  4-/5  LLE:  4-/5  Increase strength in affected mm groups by 1/3 grade   Balance Sitting EOB:  fair +  Dynamic Standing:  fair + Sitting EOB: good   Dynamic Standing: fair wheeled walker   Sitting EOB:  good   Dynamic Standing: good      Patient is Alert & Oriented x person, place, time and situation and follows directions    Sensation:  Patient  denies numbness/tingling   Edema:  none noted   Endurance: fair     Vitals: room air   Blood Pressure at rest  Blood Pressure during session    Heart Rate at rest  Heart Rate during session    SPO2 at rest %  SPO2 during session %     Patient education  Patient educated on role of Physical Therapy, risks of immobility, safety and plan of care, energy conservation,  importance of mobility while in hospital , importance and purpose of adaptive device and adjusted to proper height for the patient.  and safety      Patient response to education:   Pt verbalized understanding Pt demonstrated skill Pt requires further education in this area   Yes Partial Yes      Treatment:  Patient practiced and was instructed/facilitated in the following treatment: Patient  sitting in a wheelchair at start of tx session. Pt stood, ambulated in the hallway, and  returned to the wheelchair. Therapeutic Exercises:  not performed      At end of session, patient in care of transport. Patient would benefit from continued skilled Physical Therapy to improve functional independence and quality of life. Patient's/ family goals   Go home with her daughter    Time in 11:32  Time out  11:44    Total Treatment Time  12 minutes        CPT codes:    Therapeutic activities (71722)   12 minutes  1 unit(s)   Reggie Leisure.  Abhilash  [facility]  LIC # [id number]

## 2021-09-16 NOTE — PROGRESS NOTES
Physician Progress Note      Corky Moreno  [facility] #:                  [id number]  :                       1945  ADMIT DATE:       2021 12:19 PM  Rafa Dunaway DATE:        9/10/2021 12:00 PM  RESPONDING  PROVIDER #:        Maria Victoria Walters MD          QUERY TEXT:    Pt admitted with Ataxia. Pt noted to have prior CVA. If possible, please   document in progress notes and discharge summary if you are evaluating and/or   treating any of the following: The medical record reflects the following:  Risk Factors: history of stroke, Vascular dementia  Clinical Indicators: H&P: \"She has been unable to ambulate for 2-3 days\", \"An   attempt was made to ambulate the patient and she was ataxic. \". Treatment: CT of the head, PT/OT, social work consult, lab monitoring and   assessments. Thank you,  Valdez Eid RN, BSN, CCDS  [phone]  Options provided:  -- Ataxia as a sequela of prior CVA  -- Ataxia is not a sequela of prior CVA  -- Other - I will add my own diagnosis  -- Disagree - Not applicable / Not valid  -- Disagree - Clinically unable to determine / Unknown  -- Refer to Clinical Documentation Reviewer    PROVIDER RESPONSE TEXT:    Weakness of muscles    Query created by:  Colette Hinojosa on 2021 12:57 PM      Electronically signed by:  Maria Victoria Walters MD 2021 4:52 PM

## 2021-10-24 ENCOUNTER — APPOINTMENT (OUTPATIENT)
Dept: GENERAL RADIOLOGY | Age: 76
End: 2021-10-24
Payer: MEDICARE

## 2021-10-24 ENCOUNTER — HOSPITAL ENCOUNTER (EMERGENCY)
Age: 76
Discharge: HOME | End: 2021-10-25
Attending: EMERGENCY MEDICINE
Payer: MEDICARE

## 2021-10-24 ENCOUNTER — APPOINTMENT (OUTPATIENT)
Dept: CT IMAGING | Age: 76
End: 2021-10-24
Payer: MEDICARE
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
## 2021-10-24 PROCEDURE — 73560 X-RAY EXAM OF KNEE 1 OR 2: CPT

## 2021-10-24 PROCEDURE — 99283 EMERGENCY DEPT VISIT LOW MDM: CPT

## 2021-10-24 PROCEDURE — 73610 X-RAY EXAM OF ANKLE: CPT

## 2021-10-24 PROCEDURE — 73521 X-RAY EXAM HIPS BI 2 VIEWS: CPT

## 2021-10-24 PROCEDURE — 73110 X-RAY EXAM OF WRIST: CPT

## 2021-10-24 PROCEDURE — 70450 CT HEAD/BRAIN W/O DYE: CPT

## 2021-10-24 ASSESSMENT — ENCOUNTER SYMPTOMS
SHORTNESS OF BREATH: 0
SORE THROAT: 0
VOMITING: 0
EYE PAIN: 0
ABDOMINAL PAIN: 0
DIARRHEA: 0
SINUS PAIN: 0
NAUSEA: 0
BACK PAIN: 0

## 2021-10-24 NOTE — ED PROVIDER NOTES
Chief Complaint   Patient presents with    Fall     From [employer], fell trying to go to dinner. States she hit her head, no LOC, states she is not on thinners. 69-year-old female with past medical history of hypertension, CAD, hypothyroidism, paroxysmal atrial fibrillation, CKD presenting from [facility] after 2 falls earlier today. She said that people there told her that she does not go to meals every so often they will move her into the nursing home for instead of assisted living. Said that she skipped breakfast and lunch and was trying to go to dinner when she fell. She has lymphedema in her legs swell and she says they do not work very well, she was using her walker and instructed mechanical fall. This has happened before, the walker helps her ambulate, nothing makes it worse. The fall was not associated with lightheadedness or dizziness, chest pain, shortness of breath, diaphoresis, headache. For the first fall she said that she went down to her knees and was able to crawl to her chair. The second time she fell she was trying to get into her chair and she did hit her head. She is not on blood thinners, she did not experience loss of consciousness, is not having any headache or vision changes. Her main complaint currently is chronic swelling in her legs in addition to increased pain in her left knee as well as some bilateral hip pain. She is not experiencing numbness, tingling, weakness. Review of Systems   Constitutional: Negative for chills and fever. HENT: Negative for ear pain, sinus pain and sore throat. Eyes: Negative for pain. Respiratory: Negative for shortness of breath. Cardiovascular: Positive for leg swelling. Negative for chest pain. Chronic   Gastrointestinal: Negative for abdominal pain, diarrhea, nausea and vomiting. Genitourinary: Negative for flank pain and pelvic pain. Musculoskeletal: Negative for back pain and neck pain.         Left knee pain, bilateral hip pain. Skin: Negative for rash. Neurological: Negative for seizures and headaches. Hematological: Negative for adenopathy. All other systems reviewed and are negative. Physical Exam  Vitals and nursing note reviewed. Constitutional:       Appearance: Normal appearance. She is not ill-appearing. HENT:      Head: Normocephalic and atraumatic. Right Ear: External ear normal.      Left Ear: External ear normal.      Nose: Nose normal.      Mouth/Throat:      Mouth: Mucous membranes are moist.      Pharynx: Oropharynx is clear. Eyes:      Conjunctiva/sclera: Conjunctivae normal.      Pupils: Pupils are equal, round, and reactive to light. Cardiovascular:      Rate and Rhythm: Normal rate and regular rhythm. Pulmonary:      Breath sounds: Normal breath sounds. Abdominal:      General: Abdomen is flat. Palpations: Abdomen is soft. Musculoskeletal:         General: Tenderness present. No deformity or signs of injury. Cervical back: Neck supple. Right lower leg: Edema present. Left lower leg: Edema present. Comments: Chronic lymphedema and bilateral lower extremities, they are wrapped with Ace bandages. No increased pain or swelling. She does have tenderness to palpation of her left knee, she does not want to move it on exam.  Her sensation is equal bilaterally. Neurovascularly intact. Her hips are mildly tender to palpation as well bilaterally. Skin:     General: Skin is warm and dry. Neurological:      General: No focal deficit present. Mental Status: She is alert. Mental status is at baseline. Procedures       MDM  Number of Diagnoses or Management Options  Fall, initial encounter  Diagnosis management comments: 74-year-old female past medical history of hypertension, CAD, hypothyroidism, paroxysmal atrial fibrillation, CKD presented from [facility] with [facility] after 2 falls.   She is hypertensive on arrival to emergency department otherwise hemodynamically stable. Imaging multiple images, CT head did not show acute intracranial abnormalities, left wrist, left ankle, left knee, right knee and bilateral hips did not show acute fractures or dislocations. Her legs are chronically swollen. She denies any other complaints and knows that she can come back to the emergency department if anything acutely worsens. She verbalized understanding. ED Course as of Oct 25 0019   Yonathan Lazo Oct 24, 2021   3336 ATTENDING PROVIDER ATTESTATION:     I have personally performed and/or participated in the history, exam, medical decision making, and procedures and agree with all pertinent clinical information unless otherwise noted. I have also reviewed and agree with the past medical, family and social history unless otherwise noted. I have discussed this patient in detail with the resident, and provided the instruction and education regarding patient here complaining of a fall, she was trying to get herself dinner and fell mechanically, complains of some headache. Denies neck pain. Also complained of some left wrist pain. Denies chest pain, palpitations or shortness of breath. No lightheadedness or syncope. .  My findings/plan: Patient with chronic bandages on the bilateral lower extremities. Mild bilateral knee pain on palpation with arthritic architectural changes and no acute sign of injury or deformity. No sign of acute head or face injury. No midline cervical, thoracic or lumbar spine tenderness. Mild left wrist tenderness on palpation with no gross deformity and the hand is neurovascular intact. Arms legs otherwise with no sign of acute injuries. Chest wall, sternum and clavicles nontender. Abdomen soft and nontender. She is awake alert and oriented and conversant.           [NC]      ED Course User Index  [NC] Shelli Bee DO      ED Course as of Oct 25 0019   Yonathan Lazo Oct 24, 2021   3792 ATTENDING PROVIDER ATTESTATION:     I have personally performed and/or participated in the history, exam, medical decision making, and procedures and agree with all pertinent clinical information unless otherwise noted. I have also reviewed and agree with the past medical, family and social history unless otherwise noted. I have discussed this patient in detail with the resident, and provided the instruction and education regarding patient here complaining of a fall, she was trying to get herself dinner and fell mechanically, complains of some headache. Denies neck pain. Also complained of some left wrist pain. Denies chest pain, palpitations or shortness of breath. No lightheadedness or syncope. .  My findings/plan: Patient with chronic bandages on the bilateral lower extremities. Mild bilateral knee pain on palpation with arthritic architectural changes and no acute sign of injury or deformity. No sign of acute head or face injury. No midline cervical, thoracic or lumbar spine tenderness. Mild left wrist tenderness on palpation with no gross deformity and the hand is neurovascular intact. Arms legs otherwise with no sign of acute injuries. Chest wall, sternum and clavicles nontender. Abdomen soft and nontender. She is awake alert and oriented and conversant.           [NC]      ED Course User Index  [NC] Chichi Duvall, DO       --------------------------------------------- PAST HISTORY ---------------------------------------------  Past Medical History:  has a past medical history of Acute bronchitis, Acute on chronic diastolic (congestive) heart failure (HCC), Acute renal failure (HCC), Acute renal failure with acute cortical necrosis (HCC), Acute-on-chronic kidney injury ([address] Arthritis, Atrial fibrillation ([address] CAD (coronary artery disease), Cancer ([address] CHF (congestive heart failure) ([address] Dementia ([facility] Depression, Diabetes mellitus ([facility] Enteritis, H/O echocardiogram, History of blood transfusion, History of cardiovascular stress test, History of cardiovascular stress test, History of echocardiogram, History of echocardiogram, Hyperkalemia, Hyperlipidemia, Hypertension, 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 Past Surgical History:  has a past surgical history that includes Cardiac surgery; hiatal hernia repair; Gallbladder surgery; Thyroid surgery; Thyroidectomy; Cholecystectomy; hernia repair; Echo Complete (1/20/2014 EF62%); Diagnostic Cardiac Cath Lab Procedure (09/10/2009); Diagnostic Cardiac Cath Lab Procedure (10/11/2007); Diagnostic Cardiac Cath Lab Procedure (09/27/2006); Diagnostic Cardiac Cath Lab Procedure (05/23/2003); Hysterectomy; and joint replacement (Bilateral). Social History:  reports that she has never smoked. She has never used smokeless tobacco. She reports that she does not drink alcohol and does not use drugs. Family History: family history includes Heart Disease (age of onset: 46) in her father; Heart Failure in her brother; Stroke in her mother. The patients home medications have been reviewed. Allergies: Patient has no known allergies. -------------------------------------------------- RESULTS -------------------------------------------------  Labs:  No results found for this visit on 10/24/21. Radiology:  CT Head WO Contrast   Final Result   No acute intracranial abnormality. XR ANKLE LEFT (MIN 3 VIEWS)   Final Result   1. No acute fracture or malalignment. 2. Generalized soft tissue edema appearing similar compared to prior   3. Redemonstration of talocalcaneal arthrodesis hardware. XR WRIST LEFT (MIN 3 VIEWS)   Final Result   No fracture or dislocation of the left wrist.         XR KNEE LEFT (1-2 VIEWS)   Final Result   1. 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 2. No fracture or dislocation. 3. Minimal suprapatellar synovial effusion. XR KNEE RIGHT (1-2 VIEWS)   Final Result   1. Satisfactory alignment of right knee arthroplasty. No periprosthetic   fracture or loosening. 2. New exostosis or enthesophyte along medial margin of the femur. Follow-up   recommended to assure stability. XR HIP BILATERAL W AP PELVIS (2 VIEWS)   Final Result   No fracture or dislocation of pelvis or bilateral hips.             ------------------------- NURSING NOTES AND VITALS REVIEWED ---------------------------  Date / Time Roomed:  10/24/2021  6:00 PM  ED Bed Assignment:  17/17    The nursing notes within the ED encounter and vital signs as below have been reviewed. BP (!) 198/113   Pulse 64   Temp 97.3 °F (36.3 °C) (Oral)   Resp 18   SpO2 100%   Oxygen Saturation Interpretation: Normal      ------------------------------------------ PROGRESS NOTES ------------------------------------------  I have spoken with the patient and discussed todays results, in addition to providing specific details for the plan of care and counseling regarding the diagnosis and prognosis. Their questions are answered at this time and they are agreeable with the plan. I discussed at length with them reasons for immediate return here for re evaluation. They will followup with primary care by calling their office tomorrow. --------------------------------- ADDITIONAL PROVIDER NOTES ---------------------------------  At this time the patient is without objective evidence of an acute process requiring hospitalization or inpatient management. They have remained hemodynamically stable throughout their entire ED visit and are stable for discharge with outpatient follow-up.      The plan has been discussed in detail and they are aware of the specific conditions for emergent return, as well as the importance of follow-up. New Prescriptions    No medications on file       Diagnosis:  1. Fall, initial encounter        Disposition:  Patient's disposition: Discharge to nursing home  Patient's condition is stable.            Janay Davenport DO  Resident  10/25/21 3722

## 2021-10-25 VITALS
DIASTOLIC BLOOD PRESSURE: 74 MMHG | RESPIRATION RATE: 18 BRPM | TEMPERATURE: 98.4 F | OXYGEN SATURATION: 98 % | HEART RATE: 72 BPM | SYSTOLIC BLOOD PRESSURE: 171 MMHG

## 2021-10-25 NOTE — ED NOTES
Antoni Alpha contacted to notify of patient return, discharge instructions given to nurse and copy sent with patient     Grant Ro RN  10/25/21 7972

## 2021-12-24 NOTE — ED NOTES
The patient was able to walk with the assistance of a walker from one side of her bed to the doorway of her room and back to the opposite side of the bed. She did complain of pain with ambulation.  Dr octavia Krishna RN  02/17/21 5671 decreased ability to use arms for pushing/pulling

## 2022-03-10 ENCOUNTER — APPOINTMENT (OUTPATIENT)
Dept: GENERAL RADIOLOGY | Age: 77
End: 2022-03-10
Payer: MEDICARE

## 2022-03-10 ENCOUNTER — APPOINTMENT (OUTPATIENT)
Dept: CT IMAGING | Age: 77
End: 2022-03-10
Payer: MEDICARE

## 2022-03-10 ENCOUNTER — HOSPITAL ENCOUNTER (OUTPATIENT)
Age: 77
Setting detail: OBSERVATION
Discharge: OTHER INSTITUTION | End: 2022-03-13
Attending: STUDENT IN AN ORGANIZED HEALTH CARE EDUCATION/TRAINING PROGRAM | Admitting: INTERNAL MEDICINE
Payer: MEDICARE

## 2022-03-10 DIAGNOSIS — G93.41 METABOLIC ENCEPHALOPATHY: ICD-10-CM

## 2022-03-10 DIAGNOSIS — R29.90 STROKE-LIKE SYMPTOMS: ICD-10-CM

## 2022-03-10 DIAGNOSIS — N30.01 ACUTE CYSTITIS WITH HEMATURIA: Primary | ICD-10-CM

## 2022-03-10 LAB
APTT: 39.4 SEC (ref 24.5–35.1)
BASOPHILS ABSOLUTE: 0.05 E9/L (ref 0–0.2)
BASOPHILS RELATIVE PERCENT: 0.7 % (ref 0–2)
CHP ED QC CHECK: YES
EOSINOPHILS ABSOLUTE: 0.26 E9/L (ref 0.05–0.5)
EOSINOPHILS RELATIVE PERCENT: 3.7 % (ref 0–6)
GLUCOSE BLD-MCNC: 113 MG/DL
HCT VFR BLD CALC: 45.1 % (ref 34–48)
HEMOGLOBIN: 14.9 G/DL (ref 11.5–15.5)
IMMATURE GRANULOCYTES #: 0.07 E9/L
IMMATURE GRANULOCYTES %: 1 % (ref 0–5)
INR BLD: 1.3
LACTIC ACID, SEPSIS: 1 MMOL/L (ref 0.5–1.9)
LYMPHOCYTES ABSOLUTE: 2.02 E9/L (ref 1.5–4)
LYMPHOCYTES RELATIVE PERCENT: 28.6 % (ref 20–42)
MCH RBC QN AUTO: 32 PG (ref 26–35)
MCHC RBC AUTO-ENTMCNC: 33 % (ref 32–34.5)
MCV RBC AUTO: 97 FL (ref 80–99.9)
METER GLUCOSE: 113 MG/DL (ref 74–99)
MONOCYTES ABSOLUTE: 0.63 E9/L (ref 0.1–0.95)
MONOCYTES RELATIVE PERCENT: 8.9 % (ref 2–12)
NEUTROPHILS ABSOLUTE: 4.04 E9/L (ref 1.8–7.3)
NEUTROPHILS RELATIVE PERCENT: 57.1 % (ref 43–80)
PDW BLD-RTO: 12.6 FL (ref 11.5–15)
PLATELET # BLD: 245 E9/L (ref 130–450)
PMV BLD AUTO: 9.1 FL (ref 7–12)
PROTHROMBIN TIME: 14.6 SEC (ref 9.3–12.4)
RBC # BLD: 4.65 E12/L (ref 3.5–5.5)
WBC # BLD: 7.1 E9/L (ref 4.5–11.5)

## 2022-03-10 PROCEDURE — 80179 DRUG ASSAY SALICYLATE: CPT

## 2022-03-10 PROCEDURE — 85610 PROTHROMBIN TIME: CPT

## 2022-03-10 PROCEDURE — 81001 URINALYSIS AUTO W/SCOPE: CPT

## 2022-03-10 PROCEDURE — 99448 NTRPROF PH1/NTRNET/EHR 21-30: CPT | Performed by: PSYCHIATRY & NEUROLOGY

## 2022-03-10 PROCEDURE — 80053 COMPREHEN METABOLIC PANEL: CPT

## 2022-03-10 PROCEDURE — 71045 X-RAY EXAM CHEST 1 VIEW: CPT

## 2022-03-10 PROCEDURE — 36415 COLL VENOUS BLD VENIPUNCTURE: CPT

## 2022-03-10 PROCEDURE — 96374 THER/PROPH/DIAG INJ IV PUSH: CPT

## 2022-03-10 PROCEDURE — 70496 CT ANGIOGRAPHY HEAD: CPT | Performed by: RADIOLOGY

## 2022-03-10 PROCEDURE — 70450 CT HEAD/BRAIN W/O DYE: CPT

## 2022-03-10 PROCEDURE — 6360000004 HC RX CONTRAST MEDICATION: Performed by: RADIOLOGY

## 2022-03-10 PROCEDURE — 99285 EMERGENCY DEPT VISIT HI MDM: CPT

## 2022-03-10 PROCEDURE — 80307 DRUG TEST PRSMV CHEM ANLYZR: CPT

## 2022-03-10 PROCEDURE — 0042T CT BRAIN PERFUSION: CPT | Performed by: RADIOLOGY

## 2022-03-10 PROCEDURE — 85730 THROMBOPLASTIN TIME PARTIAL: CPT

## 2022-03-10 PROCEDURE — 82077 ASSAY SPEC XCP UR&BREATH IA: CPT

## 2022-03-10 PROCEDURE — 84484 ASSAY OF TROPONIN QUANT: CPT

## 2022-03-10 PROCEDURE — 70498 CT ANGIOGRAPHY NECK: CPT | Performed by: RADIOLOGY

## 2022-03-10 PROCEDURE — 93005 ELECTROCARDIOGRAM TRACING: CPT | Performed by: STUDENT IN AN ORGANIZED HEALTH CARE EDUCATION/TRAINING PROGRAM

## 2022-03-10 PROCEDURE — 80143 DRUG ASSAY ACETAMINOPHEN: CPT

## 2022-03-10 PROCEDURE — 85025 COMPLETE CBC W/AUTO DIFF WBC: CPT

## 2022-03-10 PROCEDURE — 83605 ASSAY OF LACTIC ACID: CPT

## 2022-03-10 PROCEDURE — 83880 ASSAY OF NATRIURETIC PEPTIDE: CPT

## 2022-03-10 PROCEDURE — 0042T CT BRAIN PERFUSION: CPT

## 2022-03-10 PROCEDURE — 70498 CT ANGIOGRAPHY NECK: CPT

## 2022-03-10 PROCEDURE — 82962 GLUCOSE BLOOD TEST: CPT

## 2022-03-10 PROCEDURE — 70496 CT ANGIOGRAPHY HEAD: CPT

## 2022-03-10 PROCEDURE — 80164 ASSAY DIPROPYLACETIC ACD TOT: CPT

## 2022-03-10 RX ADMIN — IOPAMIDOL 100 ML: 755 INJECTION, SOLUTION INTRAVENOUS at 23:51

## 2022-03-10 NOTE — LETTER
[address]  Medicaid  CERTIFICATION OF NECESSITY  FOR TRANSPORTATION   BY WHEELCHAIR VAN     Individual Information   1. Name: Alma Griffin 2. [place] Medicaid Billing Number: ***   3. Address: [address] [place] [id number]      Transportation Provider Information   4. Provider Name: ***   5. [place] Medicaid Provider Number: *** National Provider Identifier (NPI): ***     Certification  7. Criteria:  By signing this document, the practitioner certifies that two statements are true:  A. This individual must be accompanied by a mobility-related assistive device from the point of pick-up to the point of drop-off. B. Transport of this individual by standard passenger vehicle or common carrier is precluded or contraindicated. 8. Period Beginning Date: ***   9. Length  [] Not more than {#:65217} day(s)  [] One Year     Additional Information Relevant to Certification   10. Comments or Explanations, If Necessary or Appropriate     ***     Certifying Practitioner Information   11. Name of Practitioner: ***   12. [place] Medicaid Provider Number, If Applicable: *** 13. National Provider Identifier (NPI): ***     Signature Information   14. Date of Signature: *** 15. Name of Person Signing: ***   12. Signature and Professional Designation: ***     [facility] [id number]  Rev. 7/2015   [place] Department Medicaid  CERTIFICATION OF NECESSITY  FOR TRANSPORTATION   BY WHEELCHAIR VAN     Individual Information   1. Name: Alma Griffin 2. [place] Medicaid Billing Number: ***   3. Address: [address] [place] [id number]      Transportation Provider Information   4. Provider Name: ***   5. [place] Medicaid Provider Number: *** National Provider Identifier (NPI): ***     Certification  7. Criteria:  By signing this document, the practitioner certifies that two statements are true:  A.  This individual must be accompanied by a mobility-related assistive device from the point of pick-up to the point of drop-off. B. Transport of this individual by standard passenger vehicle or common carrier is precluded or contraindicated. 8. Period Beginning Date: 3/11/2022     9. Length  [x] Not more than 2 day(s)  [] One Year     Additional Information Relevant to Certification   10. Comments or Explanations, If Necessary or Appropriate     AMS, Acute metabolic encephalopathy: Suspected CVA     Certifying Practitioner Information   11. Name of [place] [id number], [address] [facility]. [place] Medicaid Provider Number, If Applicable: *** 13. National Provider Identifier (NPI): ***     Signature Information   14. Date of Signature: 3/11/2022 15. Name of Person Signing: Sagar Zapata RN   16.  Signature and Professional Designation: Electronically signed by Sagar Zapata RN on 3/11/2022 at 12:32 PM       [facility] [id number]  Rev. 7/2015

## 2022-03-11 ENCOUNTER — APPOINTMENT (OUTPATIENT)
Dept: ULTRASOUND IMAGING | Age: 77
End: 2022-03-11
Payer: MEDICARE

## 2022-03-11 ENCOUNTER — APPOINTMENT (OUTPATIENT)
Dept: MRI IMAGING | Age: 77
End: 2022-03-11
Payer: MEDICARE

## 2022-03-11 PROBLEM — G93.41 METABOLIC ENCEPHALOPATHY: Status: ACTIVE | Noted: 2022-03-11

## 2022-03-11 LAB
ACETAMINOPHEN LEVEL: <5 MCG/ML (ref 10–30)
ALBUMIN SERPL-MCNC: 4.2 G/DL (ref 3.5–5.2)
ALP BLD-CCNC: 132 U/L (ref 35–104)
ALT SERPL-CCNC: 16 U/L (ref 0–32)
AMPHETAMINE SCREEN, URINE: NOT DETECTED
ANION GAP SERPL CALCULATED.3IONS-SCNC: 15 MMOL/L (ref 7–16)
AST SERPL-CCNC: 14 U/L (ref 0–31)
BACTERIA: ABNORMAL /HPF
BARBITURATE SCREEN URINE: NOT DETECTED
BENZODIAZEPINE SCREEN, URINE: NOT DETECTED
BILIRUB SERPL-MCNC: 0.3 MG/DL (ref 0–1.2)
BILIRUBIN URINE: NEGATIVE
BLOOD, URINE: ABNORMAL
BUN BLDV-MCNC: 26 MG/DL (ref 6–23)
CALCIUM SERPL-MCNC: 9.7 MG/DL (ref 8.6–10.2)
CANNABINOID SCREEN URINE: NOT DETECTED
CHLORIDE BLD-SCNC: 105 MMOL/L (ref 98–107)
CLARITY: CLEAR
CO2: 24 MMOL/L (ref 22–29)
COCAINE METABOLITE SCREEN URINE: NOT DETECTED
COLOR: YELLOW
CREAT SERPL-MCNC: 1.6 MG/DL (ref 0.5–1)
D DIMER: <200 NG/ML DDU
EKG ATRIAL RATE: 56 BPM
EKG P AXIS: 40 DEGREES
EKG P-R INTERVAL: 236 MS
EKG Q-T INTERVAL: 508 MS
EKG QRS DURATION: 168 MS
EKG QTC CALCULATION (BAZETT): 490 MS
EKG R AXIS: 24 DEGREES
EKG T AXIS: 36 DEGREES
EKG VENTRICULAR RATE: 56 BPM
ETHANOL: <10 MG/DL (ref 0–0.08)
FENTANYL SCREEN, URINE: NOT DETECTED
GFR AFRICAN AMERICAN: 38
GFR NON-AFRICAN AMERICAN: 31 ML/MIN/1.73
GLUCOSE BLD-MCNC: 103 MG/DL (ref 74–99)
GLUCOSE URINE: 100 MG/DL
KETONES, URINE: NEGATIVE MG/DL
LEUKOCYTE ESTERASE, URINE: ABNORMAL
Lab: NORMAL
METER GLUCOSE: 119 MG/DL (ref 74–99)
METER GLUCOSE: 155 MG/DL (ref 74–99)
METER GLUCOSE: 94 MG/DL (ref 74–99)
METER GLUCOSE: 96 MG/DL (ref 74–99)
METHADONE SCREEN, URINE: NOT DETECTED
NITRITE, URINE: NEGATIVE
OPIATE SCREEN URINE: NOT DETECTED
OXYCODONE URINE: NOT DETECTED
PH UA: 6 (ref 5–9)
PHENCYCLIDINE SCREEN URINE: NOT DETECTED
POTASSIUM SERPL-SCNC: 3.8 MMOL/L (ref 3.5–5)
PRO-BNP: 244 PG/ML (ref 0–450)
PROTEIN UA: NEGATIVE MG/DL
RBC UA: ABNORMAL /HPF (ref 0–2)
SALICYLATE, SERUM: <0.3 MG/DL (ref 0–30)
SODIUM BLD-SCNC: 144 MMOL/L (ref 132–146)
SPECIFIC GRAVITY UA: 1.01 (ref 1–1.03)
TOTAL PROTEIN: 7.5 G/DL (ref 6.4–8.3)
TRICYCLIC ANTIDEPRESSANTS SCREEN SERUM: NEGATIVE NG/ML
TROPONIN, HIGH SENSITIVITY: 10 NG/L (ref 0–9)
TROPONIN, HIGH SENSITIVITY: 9 NG/L (ref 0–9)
TROPONIN, HIGH SENSITIVITY: 9 NG/L (ref 0–9)
UROBILINOGEN, URINE: 0.2 E.U./DL
VALPROIC ACID LEVEL: 28 MCG/ML (ref 50–100)
WBC UA: ABNORMAL /HPF (ref 0–5)

## 2022-03-11 PROCEDURE — 96374 THER/PROPH/DIAG INJ IV PUSH: CPT

## 2022-03-11 PROCEDURE — 36415 COLL VENOUS BLD VENIPUNCTURE: CPT

## 2022-03-11 PROCEDURE — 2580000003 HC RX 258: Performed by: STUDENT IN AN ORGANIZED HEALTH CARE EDUCATION/TRAINING PROGRAM

## 2022-03-11 PROCEDURE — 6360000002 HC RX W HCPCS: Performed by: INTERNAL MEDICINE

## 2022-03-11 PROCEDURE — 85378 FIBRIN DEGRADE SEMIQUANT: CPT

## 2022-03-11 PROCEDURE — 96376 TX/PRO/DX INJ SAME DRUG ADON: CPT

## 2022-03-11 PROCEDURE — G0378 HOSPITAL OBSERVATION PER HR: HCPCS

## 2022-03-11 PROCEDURE — 6360000002 HC RX W HCPCS: Performed by: STUDENT IN AN ORGANIZED HEALTH CARE EDUCATION/TRAINING PROGRAM

## 2022-03-11 PROCEDURE — 70551 MRI BRAIN STEM W/O DYE: CPT

## 2022-03-11 PROCEDURE — 93970 EXTREMITY STUDY: CPT | Performed by: RADIOLOGY

## 2022-03-11 PROCEDURE — 2580000003 HC RX 258: Performed by: INTERNAL MEDICINE

## 2022-03-11 PROCEDURE — 6370000000 HC RX 637 (ALT 250 FOR IP): Performed by: INTERNAL MEDICINE

## 2022-03-11 PROCEDURE — 93970 EXTREMITY STUDY: CPT

## 2022-03-11 PROCEDURE — 84484 ASSAY OF TROPONIN QUANT: CPT

## 2022-03-11 PROCEDURE — 82962 GLUCOSE BLOOD TEST: CPT

## 2022-03-11 RX ORDER — SODIUM CHLORIDE 9 MG/ML
INJECTION, SOLUTION INTRAVENOUS CONTINUOUS
Status: DISCONTINUED | OUTPATIENT
Start: 2022-03-11 | End: 2022-03-13 | Stop reason: HOSPADM

## 2022-03-11 RX ORDER — ALLOPURINOL 100 MG/1
100 TABLET ORAL DAILY
Status: DISCONTINUED | OUTPATIENT
Start: 2022-03-11 | End: 2022-03-13 | Stop reason: HOSPADM

## 2022-03-11 RX ORDER — ROPINIROLE 1 MG/1
1 TABLET, FILM COATED ORAL 3 TIMES DAILY
Status: DISCONTINUED | OUTPATIENT
Start: 2022-03-11 | End: 2022-03-13 | Stop reason: HOSPADM

## 2022-03-11 RX ORDER — HYDROCODONE BITARTRATE AND ACETAMINOPHEN 5; 325 MG/1; MG/1
1 TABLET ORAL EVERY 6 HOURS PRN
Status: DISCONTINUED | OUTPATIENT
Start: 2022-03-11 | End: 2022-03-13 | Stop reason: HOSPADM

## 2022-03-11 RX ORDER — ACETAMINOPHEN 325 MG/1
650 TABLET ORAL EVERY 6 HOURS PRN
Status: DISCONTINUED | OUTPATIENT
Start: 2022-03-11 | End: 2022-03-13 | Stop reason: HOSPADM

## 2022-03-11 RX ORDER — ONDANSETRON 2 MG/ML
4 INJECTION INTRAMUSCULAR; INTRAVENOUS EVERY 6 HOURS PRN
Status: DISCONTINUED | OUTPATIENT
Start: 2022-03-11 | End: 2022-03-13 | Stop reason: HOSPADM

## 2022-03-11 RX ORDER — SODIUM CHLORIDE 0.9 % (FLUSH) 0.9 %
5-40 SYRINGE (ML) INJECTION EVERY 12 HOURS SCHEDULED
Status: DISCONTINUED | OUTPATIENT
Start: 2022-03-11 | End: 2022-03-13 | Stop reason: HOSPADM

## 2022-03-11 RX ORDER — AMLODIPINE BESYLATE 5 MG/1
5 TABLET ORAL DAILY
Status: DISCONTINUED | OUTPATIENT
Start: 2022-03-11 | End: 2022-03-13 | Stop reason: HOSPADM

## 2022-03-11 RX ORDER — DULOXETIN HYDROCHLORIDE 30 MG/1
30 CAPSULE, DELAYED RELEASE ORAL NIGHTLY
Status: DISCONTINUED | OUTPATIENT
Start: 2022-03-11 | End: 2022-03-13 | Stop reason: HOSPADM

## 2022-03-11 RX ORDER — DIVALPROEX SODIUM 125 MG/1
125 CAPSULE, COATED PELLETS ORAL 2 TIMES DAILY
Status: DISCONTINUED | OUTPATIENT
Start: 2022-03-11 | End: 2022-03-13 | Stop reason: HOSPADM

## 2022-03-11 RX ORDER — GABAPENTIN 300 MG/1
300 CAPSULE ORAL NIGHTLY
Status: DISCONTINUED | OUTPATIENT
Start: 2022-03-11 | End: 2022-03-13 | Stop reason: HOSPADM

## 2022-03-11 RX ORDER — ONDANSETRON 4 MG/1
4 TABLET, ORALLY DISINTEGRATING ORAL EVERY 8 HOURS PRN
Status: DISCONTINUED | OUTPATIENT
Start: 2022-03-11 | End: 2022-03-13 | Stop reason: HOSPADM

## 2022-03-11 RX ORDER — RIVASTIGMINE TARTRATE 3 MG/1
3 CAPSULE ORAL 2 TIMES DAILY
Status: DISCONTINUED | OUTPATIENT
Start: 2022-03-11 | End: 2022-03-13 | Stop reason: HOSPADM

## 2022-03-11 RX ORDER — POLYETHYLENE GLYCOL 3350 17 G/17G
17 POWDER, FOR SOLUTION ORAL DAILY PRN
Status: DISCONTINUED | OUTPATIENT
Start: 2022-03-11 | End: 2022-03-13 | Stop reason: HOSPADM

## 2022-03-11 RX ORDER — BUPROPION HYDROCHLORIDE 150 MG/1
150 TABLET ORAL EVERY MORNING
Status: DISCONTINUED | OUTPATIENT
Start: 2022-03-11 | End: 2022-03-11 | Stop reason: CLARIF

## 2022-03-11 RX ORDER — ACETAMINOPHEN 650 MG/1
650 SUPPOSITORY RECTAL EVERY 6 HOURS PRN
Status: DISCONTINUED | OUTPATIENT
Start: 2022-03-11 | End: 2022-03-13 | Stop reason: HOSPADM

## 2022-03-11 RX ORDER — SODIUM CHLORIDE 9 MG/ML
25 INJECTION, SOLUTION INTRAVENOUS PRN
Status: DISCONTINUED | OUTPATIENT
Start: 2022-03-11 | End: 2022-03-13 | Stop reason: HOSPADM

## 2022-03-11 RX ORDER — LEVOTHYROXINE SODIUM 0.1 MG/1
100 TABLET ORAL DAILY
Status: DISCONTINUED | OUTPATIENT
Start: 2022-03-11 | End: 2022-03-13 | Stop reason: HOSPADM

## 2022-03-11 RX ORDER — TOPIRAMATE 100 MG/1
100 TABLET, FILM COATED ORAL NIGHTLY
Status: DISCONTINUED | OUTPATIENT
Start: 2022-03-11 | End: 2022-03-13 | Stop reason: HOSPADM

## 2022-03-11 RX ORDER — SODIUM CHLORIDE 0.9 % (FLUSH) 0.9 %
5-40 SYRINGE (ML) INJECTION PRN
Status: DISCONTINUED | OUTPATIENT
Start: 2022-03-11 | End: 2022-03-13 | Stop reason: HOSPADM

## 2022-03-11 RX ORDER — ATORVASTATIN CALCIUM 10 MG/1
10 TABLET, FILM COATED ORAL DAILY
Status: DISCONTINUED | OUTPATIENT
Start: 2022-03-11 | End: 2022-03-13 | Stop reason: HOSPADM

## 2022-03-11 RX ORDER — FLUOXETINE HYDROCHLORIDE 20 MG/1
20 CAPSULE ORAL DAILY
Status: DISCONTINUED | OUTPATIENT
Start: 2022-03-11 | End: 2022-03-11 | Stop reason: ALTCHOICE

## 2022-03-11 RX ADMIN — Medication 10 ML: at 09:20

## 2022-03-11 RX ADMIN — GABAPENTIN 300 MG: 300 CAPSULE ORAL at 21:00

## 2022-03-11 RX ADMIN — ROPINIROLE HYDROCHLORIDE 1 MG: 1 TABLET, FILM COATED ORAL at 10:42

## 2022-03-11 RX ADMIN — ROPINIROLE HYDROCHLORIDE 1 MG: 1 TABLET, FILM COATED ORAL at 16:37

## 2022-03-11 RX ADMIN — RIVAROXABAN 20 MG: 20 TABLET, FILM COATED ORAL at 16:39

## 2022-03-11 RX ADMIN — RIVASTIGMINE TARTRATE 3 MG: 3 CAPSULE ORAL at 21:00

## 2022-03-11 RX ADMIN — Medication 10 ML: at 21:00

## 2022-03-11 RX ADMIN — ALLOPURINOL 100 MG: 100 TABLET ORAL at 09:19

## 2022-03-11 RX ADMIN — ROPINIROLE HYDROCHLORIDE 1 MG: 1 TABLET, FILM COATED ORAL at 21:00

## 2022-03-11 RX ADMIN — AMLODIPINE BESYLATE 5 MG: 5 TABLET ORAL at 09:19

## 2022-03-11 RX ADMIN — DIVALPROEX SODIUM 125 MG: 125 CAPSULE, COATED PELLETS ORAL at 10:42

## 2022-03-11 RX ADMIN — ATORVASTATIN CALCIUM 10 MG: 10 TABLET, FILM COATED ORAL at 09:19

## 2022-03-11 RX ADMIN — WATER 1000 MG: 1 INJECTION INTRAMUSCULAR; INTRAVENOUS; SUBCUTANEOUS at 21:00

## 2022-03-11 RX ADMIN — CEFTRIAXONE 1000 MG: 1 INJECTION, POWDER, FOR SOLUTION INTRAMUSCULAR; INTRAVENOUS at 01:42

## 2022-03-11 RX ADMIN — DULOXETINE HYDROCHLORIDE 30 MG: 30 CAPSULE, DELAYED RELEASE ORAL at 23:08

## 2022-03-11 RX ADMIN — TOPIRAMATE 100 MG: 100 TABLET, FILM COATED ORAL at 21:00

## 2022-03-11 RX ADMIN — LEVOTHYROXINE SODIUM 100 MCG: 0.1 TABLET ORAL at 06:48

## 2022-03-11 RX ADMIN — DIVALPROEX SODIUM 125 MG: 125 CAPSULE, COATED PELLETS ORAL at 21:00

## 2022-03-11 RX ADMIN — VORTIOXETINE 20 MG: 10 TABLET, FILM COATED ORAL at 09:19

## 2022-03-11 RX ADMIN — SODIUM CHLORIDE: 9 INJECTION, SOLUTION INTRAVENOUS at 09:15

## 2022-03-11 RX ADMIN — RIVASTIGMINE TARTRATE 3 MG: 3 CAPSULE ORAL at 10:42

## 2022-03-11 ASSESSMENT — PAIN SCALES - GENERAL
PAINLEVEL_OUTOF10: 0
PAINLEVEL_OUTOF10: 0

## 2022-03-11 NOTE — ED NOTES
Time Neurologist page:[phone]      Time Stroke Alert called:22:52  :    Time Neurologist called back:2304    X-Ray/CT notified:     Anthony Frazier  03/10/22 [address] [facility]  03/10/22 0402

## 2022-03-11 NOTE — VIRTUAL HEALTH
TeleStroke Brief note     This is a 79-year-old female who comes from the nursing home with concerns of stroke falling to the left  On arrival she seems to have lower extremity weakness and confusion more metabolic toxic or infective     CT does not show any evidence of intracranial hemorrhage    CT perfusion does not show any perfusion mismatch    Viz.  AI was utilized    Cta no lvo     Assessment  Non neurov ascular etiology for confusion     Recommendations   Not a tpa candidate as on xarelto also outside the time window   No lvo  Metabolic work up per primary   Mri brain if further work up unrevealing     Discussed with Ed physician  Time spent medical decision making including review of images =25 min

## 2022-03-11 NOTE — H&P
Hospitalist History & Physical      PCP: Jailene Ng MD    Date of Admission: 3/10/2022    Date of Service: Pt seen/examined on 3/10/2022 and is admitted to Inpatient with expected LOS greater than two midnights due to medical therapy. Chief Complaint:  had concerns including Fatigue (Aid states patient has not been acting herself today. Called EMS for slurred speech, patient states she is foggy. ). History Of Present Illness:    Ms. Gunner Pham, a 67y.o. year old female  who  has a past medical history of Acute bronchitis, Acute on chronic diastolic (congestive) heart failure (HCC), Acute renal failure (HCC), Acute renal failure with acute cortical necrosis ([address] Acute-on-chronic kidney injury ([address] Arthritis, Atrial fibrillation ([address] CAD (coronary artery disease), Cancer ([address] CHF (congestive heart failure) ([address] Dementia ([address] Depression, Diabetes mellitus ([address] Enteritis, H/O echocardiogram, History of blood transfusion, History of cardiovascular stress test, History of cardiovascular stress test, History of echocardiogram, History of echocardiogram, Hyperkalemia, Hyperlipidemia, Hypertension, 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 syndrome, Stroke ([address] Unspecified cerebral artery occlusion with cerebral infarction, and Unstable angina ([address] This is a 78-year-old white female who was brought in from nursing home because of altered mental status. Staff noted facial droop. There was no reported fever. No reported nausea vomiting or chest pain. There was no reported shortness of breath. At the time of examination patient is not a very good historian but has been complaining of knee pain. There was no reported hematuria dysuria hematemesis or hematochezia.       Past Medical History:        Diagnosis Date    Acute bronchitis 4/27/2015    Acute on chronic diastolic (congestive) heart failure ([address] 7/18/2017    Acute renal failure (HCC)     Acute renal failure with acute cortical necrosis ([address] 12/29/2013    Acute-on-chronic kidney injury ([address] 1/19/2014    Arthritis     Atrial fibrillation (HCC)     CAD (coronary artery disease)     stent x 3    Cancer ([address]     thyroid and skin    CHF (congestive heart failure) ([address]     Echo 1/20/14 EF 73% stage I diastolic    Dementia ([address]     Depression     Diabetes mellitus ([address]     Enteritis 9/12/2014    H/O echocardiogram 4/10/15    EF 47% stage I diastolic    History of blood transfusion     History of cardiovascular stress test 1/20/14    Lexiscan    History of cardiovascular stress test 11/20/2017    Lexiscan stress test    History of echocardiogram 03/19/2016    EF 70-75%, There is doppler evidence of stage I diastolic dysfunction    History of echocardiogram 12/15/2017    EF 70%    Hyperkalemia 1/1/2014    Hyperlipidemia     Hypertension     Hyperuricemia without signs inflammatory arthritis/tophaceous disease     Hypothyroidism     Lymphedema     Mood and affect disturbance     Restless leg syndrome     Stroke [facility])     pt stated 6 strokes    Unspecified cerebral artery occlusion with cerebral infarction     Unstable angina ([address] 1/19/2014       Past Surgical History:        Procedure Laterality Date    CARDIAC SURGERY      CHOLECYSTECTOMY      DIAGNOSTIC CARDIAC CATH LAB PROCEDURE  09/10/2009    LV 70%. stenotic CAD. Previously placed BM stent: LAD-patent. Successful GUILLERMO of LAD: mid    DIAGNOSTIC CARDIAC CATH LAB PROCEDURE  10/11/2007    LVF well preserved, EF 70%. No MR. Patent coronary arteries. Patent stented segments in LAD.  DIAGNOSTIC CARDIAC CATH LAB PROCEDURE  09/27/2006    Patent stents in proximal and mid LAD with mild instent stenosis. Mild CAD of mid LAD 30% stenosis. Normal LVSF, EF 60%.      DIAGNOSTIC CARDIAC CATH LAB PROCEDURE  05/23/2003    EF and contraction pattern normal. No MR. Normal LVF. Atherosclerotic CAD.  ECHO COMPLETE  1/20/2014 EF62%    stage I diastolic    GALLBLADDER SURGERY      HERNIA REPAIR      HIATAL HERNIA REPAIR      HYSTERECTOMY      JOINT REPLACEMENT Bilateral     knee    THYROID SURGERY      THYROIDECTOMY         Medications Prior to Admission:      Prior to Admission medications    Medication Sig Start Date End Date Taking? Authorizing Provider   amLODIPine (NORVASC) 5 MG tablet Take 1 tablet by mouth daily 9/10/21   Sana Stinson MD   Vitamin D (CHOLECALCIFEROL) 50 MCG (2000 UT) TABS tablet Take 1 tablet by mouth daily 9/10/21   Helena Duckworth MD   VORTIoxetine (TRINTELLIX) 5 MG tablet Take 5 mg by mouth daily    Historical Provider, MD   magnesium hydroxide (MILK OF MAGNESIA) 400 MG/5ML suspension Take 30 mLs by mouth daily as needed for Constipation    Historical Provider, MD   levothyroxine (SYNTHROID) 100 MCG tablet Take 100 mcg by mouth Daily    Historical Provider, MD   VORTIoxetine HBr (TRINTELLIX) 20 MG TABS tablet Take 10 mg by mouth daily    Historical Provider, MD   diclofenac sodium (VOLTAREN) 1 % GEL Apply 2 g topically 2 times daily For knee pain    Historical Provider, MD   DULoxetine (CYMBALTA) 60 MG extended release capsule Take 60 mg by mouth nightly    Historical Provider, MD   divalproex (DEPAKOTE SPRINKLE) 125 MG capsule Take 125 mg by mouth 2 times daily    Historical Provider, MD   rivastigmine (EXELON) 3 MG capsule Take 3 mg by mouth 2 times daily    Historical Provider, MD   gabapentin (NEURONTIN) 300 MG capsule Take 300 mg by mouth nightly.     Historical Provider, MD   rOPINIRole (REQUIP) 1 MG tablet Take 1 mg by mouth 3 times daily    Historical Provider, MD   torsemide (DEMADEX) 20 MG tablet Take 40 mg by mouth daily    Historical Provider, MD   acetaminophen (TYLENOL) 325 MG tablet Take 650 mg by mouth every 4 hours as needed for Pain    Historical Provider, MD   atorvastatin (LIPITOR) 10 MG tablet Take 10 mg by mouth daily    Historical Provider, MD   buPROPion (WELLBUTRIN XL) 150 MG extended release tablet Take 150 mg by mouth every morning    Historical Provider, MD   topiramate (TOPAMAX) 100 MG tablet Take 100 mg by mouth nightly    Historical Provider, MD   rivaroxaban (XARELTO) 20 MG TABS tablet Take 20 mg by mouth daily (with breakfast)    Historical Provider, MD   FLUoxetine (PROZAC) 10 MG capsule Take 20 mg by mouth daily     Historical Provider, MD   allopurinol (ZYLOPRIM) 100 MG tablet Take 100 mg by mouth daily    Historical Provider, MD   Glucose Blood (BLOOD GLUCOSE TEST STRIPS) STRP For use with glucometer 3/14/18   Haroon Mayorga DO       Allergies:  Patient has no known allergies. Social History:    TOBACCO:   reports that she has never smoked. She has never used smokeless tobacco.  ETOH:   reports no history of alcohol use. Family History:    Reviewed in detail and negative for DM, CAD, Cancer, CVA. Positive as follows\"      Problem Relation Age of Onset    Heart Disease Father 46        , cardiomyopathy    Stroke Mother     Heart Failure Brother        REVIEW OF SYSTEMS:   Pertinent positives as noted in the HPI. All other systems reviewed and negative. No chest pain no abdominal pain no nausea or vomiting  PHYSICAL EXAM:  /67   Pulse 59   Temp 97.8 °F (36.6 °C) (Oral)   Resp 16   Ht 5' 6\" (1.676 m)   Wt 227 lb (103 kg)   SpO2 97%   BMI 36.64 kg/m²   General appearance: No apparent distress, appears stated age and cooperative. HEENT: Normal cephalic, atraumatic without obvious deformity. Pupils equal, round, and reactive to light. Extra ocular muscles intact. Conjunctivae/corneas clear. Neck: Supple, with full range of motion. No jugular venous distention. Trachea midline.   Respiratory: Clear to auscultation, no crackles no rhonchi  Cardiovascular: S1-S2 normal, no rubs gallops or murmurs  Abdomen: Soft nontender nondistended, positive bowel sounds  Musculoskeletal: No clubbing, cyanosis, trace bilateral edema  Skin: Normal skin color. No rashes or lesions. Neurologic:  Neurovascularly intact without any focal sensory/motor deficits. Cranial nerves: II-XII intact, grossly non-focal.  Psychiatric: Partially oriented, not in distress    Reviewed EKG and CXR personally    CBC:   Recent Labs     03/10/22  2250   WBC 7.1   RBC 4.65   HGB 14.9   HCT 45.1   MCV 97.0   RDW 12.6        BMP:   Recent Labs     03/10/22  2250      K 3.8      CO2 24   BUN 26*   CREATININE 1.6*     LFT:  Recent Labs     03/10/22  2250   PROT 7.5   ALKPHOS 132*   ALT 16   AST 14   BILITOT 0.3     CE:  No results for input(s): Chyrel Lions in the last 72 hours. PT/INR:   Recent Labs     03/10/22  2250   INR 1.3   APTT 39.4*     BNP: No results for input(s): BNP in the last 72 hours.   ESR:   Lab Results   Component Value Date    SEDRATE 40 (H) 02/02/2017     CRP:   Lab Results   Component Value Date    CRP 0.5 (H) 02/02/2017     D Dimer:   Lab Results   Component Value Date    DDIMER <200 02/03/2017      Folate and B12:   Lab Results   Component Value Date    CKTBMOHO76 850 09/07/2021   ,   Lab Results   Component Value Date    FOLATE 12.3 09/07/2021     Lactic Acid:   Lab Results   Component Value Date    LACTA 0.7 09/06/2021     Thyroid Studies:   Lab Results   Component Value Date    TSH 13.630 (H) 09/06/2021    M4GYQBR 7.6 05/10/2015       Oupatient labs:  Lab Results   Component Value Date    CHOL 225 (H) 02/15/2019    TRIG 252 (H) 02/15/2019    HDL 44 02/15/2019    LDLCALC 131 (H) 02/15/2019    TSH 13.630 (H) 09/06/2021    INR 1.3 03/10/2022    LABA1C 5.6 03/15/2020       Urinalysis:    Lab Results   Component Value Date    NITRU Negative 03/10/2022    WBCUA 2-5 03/10/2022    BACTERIA FEW 03/10/2022    RBCUA 2-5 03/10/2022    RBCUA 0-1 12/29/2013    BLOODU MODERATE 03/10/2022    SPECGRAV 1.010 03/10/2022    GLUCOSEU 100 03/10/2022       Imaging:  CT HEAD WO CONTRAST    Result Age: 67 years Gender: Female Order Date:  ALERT:  THIS IS AN ABNORMAL REPORT EXAM: CTA NECK W CONTRAST, CTA HEAD W CONTRAST NUMBER OF IMAGES:  [id number] INDICATION: COMPARISON: None Technique: Low-dose CT  acquisition technique included one of following options; 1 . Automated exposure control, 2. Adjustment of MA and or KV according to patient's size or 3. Use of iterative reconstruction. Contiguous spiral images were obtained in the axial plane, following the administration of intravenous contrast using CT angiographic protocol. Sagittal and coronal images were reconstructed from the axial plane acquisition. Additional MIP reconstructions were presented to aid in the interpretation of this study. Images were obtained from the skull base cranially. There is mild calcified plaque identified in the vessels compatible with atherosclerotic disease. There is aortic arch and great vessels demonstrate mild atherosclerotic disease. The right carotid is mildly atherosclerotic without significant stenosis The left carotid is mildly atherosclerotic without significant stenosis The right vertebral artery is mildly atherosclerotic without significant stenosis The left vertebral artery is mildly atherosclerotic without significant stenosis The basilar artery is unremarkable The middle cerebral arteries are unremarkable The anterior cerebral arteries are unremarkable The posterior cerebral arteries are unremarkable     1. Estimated stenosis of the proximal right and left internal carotid artery by NASCET criteria is not hemodynamically significant 2. Mild atherosclerotic disease . 3. No large vessel occlusion identified This study was analyzed by the Viz. ai algorithm. CT BRAIN PERFUSION    Result Date: 3/10/2022  Patient MRN: [id number] : 1945 Age:  67 years Gender: Female Order Date: 3/10/2022 Exam: CT BRAIN PERFUSION Number of Images: 004 views Indication:   left facial droop left facial droop Comparison: None.  Findings: Perfusion images demonstrate symmetric blood volume Blood flow images demonstrate symmetric blood flow There is no significant ischemic penumbra identified. There is no significant core infarct identified. No significant ischemic penumbra identified This study was analyzed by the Viz. ai algorithm. CTA HEAD W CONTRAST    Result Date: 3/10/2022  Patient MRN:  [id number] : 1945 Age: 67 years Gender: Female Order Date:  ALERT:  THIS IS AN ABNORMAL REPORT EXAM: CTA NECK W CONTRAST, CTA HEAD W CONTRAST NUMBER OF IMAGES:  [id number] INDICATION: COMPARISON: None Technique: Low-dose CT  acquisition technique included one of following options; 1 . Automated exposure control, 2. Adjustment of MA and or KV according to patient's size or 3. Use of iterative reconstruction. Contiguous spiral images were obtained in the axial plane, following the administration of intravenous contrast using CT angiographic protocol. Sagittal and coronal images were reconstructed from the axial plane acquisition. Additional MIP reconstructions were presented to aid in the interpretation of this study. Images were obtained from the skull base cranially. There is mild calcified plaque identified in the vessels compatible with atherosclerotic disease. There is aortic arch and great vessels demonstrate mild atherosclerotic disease. The right carotid is mildly atherosclerotic without significant stenosis The left carotid is mildly atherosclerotic without significant stenosis The right vertebral artery is mildly atherosclerotic without significant stenosis The left vertebral artery is mildly atherosclerotic without significant stenosis The basilar artery is unremarkable The middle cerebral arteries are unremarkable The anterior cerebral arteries are unremarkable The posterior cerebral arteries are unremarkable     1. Estimated stenosis of the proximal right and left internal carotid artery by NASCET criteria is not hemodynamically significant 2. Mild atherosclerotic disease . 3. No large vessel occlusion identified This study was analyzed by the Viz. ai algorithm. ASSESSMENT:    Active Problems:    Stroke-like symptom  Resolved Problems:    * No resolved hospital problems. *    Assessment and plan:    1. Acute metabolic encephalopathy: Suspected CVA, appreciate teleneurology, no evidence of acute CVA, continue supportive care, not a TPA candidate, patient is on Xarelto. Pending brain MRI. 2.  Acute UTI unspecified organism or location likely contributing to metabolic encephalopathy: Start IV Rocephin, pending cultures. 3.  Hypovolemia: Start normal saline at 50 mL/h.    4.  Chronic kidney disease stage IIIb: Serum creatinine 1.6, appears to be at baseline. Avoid nephrotoxins and maintain euvolemia. 5.  Trace bilateral lower extremity edema: Obtain D-dimer, negative cardiac enzymes, obtain lower extremity Dopplers and 2D echo. Most recent 2D echo EF 01% with diastolic dysfunction, chronic. 6.  Essential hypertension: Continue Norvasc    7. Hyperlipidemia: Continue statin    8. Chronic atrial fibrillation: Continue Xarelto, monitor heart rate. 9.  Hypothyroidism: Continue Synthroid    10. Depression: Continue Prozac    11.   Diabetes type 2 with peripheral neuropathy: Place insulin sliding scale, continue gabapentin    Disposition: Back to nursing home once clinically better      Diet: No diet orders on file  Code Status: Prior    Surrogate decision maker confirmed with patient:  Primary Emergency Contact: Elsa Griffin, Home Phone: [phone]    DVT Prophylaxis: []Lovenox []Heparin []PCD [x] [address] []Encouraged ambulation    Disposition: []Med/Surg [] Intermediate [] ICU/CCU  Admit status: [x] Observation [] Inpatient     +++++++++++++++++++++++++++++++++++++++++++++++++  MD ANGELA Spicer/ Jimmy Muñiz [address], [place]  +++++++++++++++++++++++++++++++++++++++++++++++++  NOTE: This report was transcribed using voice recognition software. Every effort was made to ensure accuracy; however, inadvertent computerized transcription errors may be present.

## 2022-03-11 NOTE — ED PROVIDER NOTES
Department of Emergency Medicine   ED  Provider Note  Admit Date/RoomTime: 3/10/2022 10:32 PM  ED Room: 8210/8210-B          History of Present Illness:  3/11/22, Time: 2:11 PM EST  Chief Complaint   Patient presents with    Fatigue     Aid states patient has not been acting herself today. Called EMS for slurred speech, patient states she is foggy. Efe Ghosh is a 67 y.o. female presenting to the ED for ental status. According to EMS, the patient's last known well was 7 PM.  She was noted to have a left facial droop and right-sided weakness. She has been having declining mentation. She does have history of dementia. The patient is on Xarelto. The patient is a poor historian. She denies any complaints. When asked about numbness and tingling she does endorse she has some of the left face and upper extremity. When questioned about weakness the patient is tearful and unable to respond. She denies any complaints of pain, fevers, coughing or vomiting. Subsequently, I was able to speak with the patient's [facility]. I spoke with her caretaker Keesha Wilson who reports that the patient's last known well was actually 7:30 AM.  She did not come down for meals. She noted right-sided weakness and that the patient was having decreased intake. She denies any known trauma. No recent fevers or cough.     Review of Systems:   Unable to obtain further secondary patient's clinical status    --------------------------------------------- PAST HISTORY ---------------------------------------------  Past Medical History:  has a past medical history of Acute bronchitis, Acute on chronic diastolic (congestive) heart failure (HCC), Acute renal failure ([address] Acute renal failure with acute cortical necrosis ([facility] Acute-on-chronic kidney injury ([facility] [address] Arthritis, Atrial fibrillation ([address] CAD (coronary artery disease), Cancer ([facility] CHF (congestive heart failure) ([facility] Dementia ([facility] Depression, Diabetes mellitus ([facility] Enteritis, H/O echocardiogram, History of blood transfusion, History of cardiovascular stress test, History of cardiovascular stress test, History of echocardiogram, History of echocardiogram, Hyperkalemia, Hyperlipidemia, Hypertension, 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 Past Surgical History:  has a past surgical history that includes Cardiac surgery; hiatal hernia repair; Gallbladder surgery; Thyroid surgery; Thyroidectomy; Cholecystectomy; hernia repair; Echo Complete (1/20/2014 EF62%); Diagnostic Cardiac Cath Lab Procedure (09/10/2009); Diagnostic Cardiac Cath Lab Procedure (10/11/2007); Diagnostic Cardiac Cath Lab Procedure (09/27/2006); Diagnostic Cardiac Cath Lab Procedure (05/23/2003); Hysterectomy; and joint replacement (Bilateral). Social History:  reports that she has never smoked. She has never used smokeless tobacco. She reports that she does not drink alcohol and does not use drugs. Family History: family history includes Heart Disease (age of onset: 46) in her father; Heart Failure in her brother; Stroke in her mother. . Unless otherwise noted, family history is non contributory    The patients home medications have been reviewed. Allergies: Patient has no known allergies. I have reviewed the past medical history, past surgical history, social history, and family history    ---------------------------------------------------PHYSICAL EXAM--------------------------------------    General: The patient is conversational and lying comfortably in bed. Head: Normocephalic and atraumatic. Eyes: Sclera non-icteric and no conjunctival injection  ENT: Nasal and oral membranes moist  Neck: Trachea midline.   No JVD  Respiratory: Lungs clear to auscultation bilaterally. Patient speaking in full sentences. Cardiovascular: Regular rate. No pedal edema. Gastrointestinal:  Abdomen is soft and nondistended. Bowel sounds present. There is no tenderness. There is no guarding or rebound. Musculoskeletal: No deformity. No bony tenderness. Right lower extremity is larger when compared to left  Skin: Skin warm and dry. No rashes. Neurologic: Patient has minimal left-sided facial droop noted however refuses to smile or puff out her cheeks. She is alert to only person. When questioned about place and time she begins to cry. Pupils are equal reactive to light. Patient will not test extraocular eye movements. Questionably decreased sensation over the left face and left upper extremity. 5 out of 5 strength of the upper extremities. Tremor with finger-nose testing bilaterally. Patient is unable to lift her legs up off of the bed. Able to wiggle her toes. Psychiatric: Tearful throughout our examination. Intermittent flat affect.    -------------------------------------------------- RESULTS -------------------------------------------------  I have personally reviewed all laboratory and imaging results for this patient. Results are listed below.      LABS: (Lab results interpreted by me)  Results for orders placed or performed during the hospital encounter of 03/10/22   CBC with Auto Differential   Result Value Ref Range    WBC 7.1 4.5 - 11.5 E9/L    RBC 4.65 3.50 - 5.50 E12/L    Hemoglobin 14.9 11.5 - 15.5 g/dL    Hematocrit 45.1 34.0 - 48.0 %    MCV 97.0 80.0 - 99.9 fL    MCH 32.0 26.0 - 35.0 pg    MCHC 33.0 32.0 - 34.5 %    RDW 12.6 11.5 - 15.0 fL    Platelets 253 195 - 784 E9/L    MPV 9.1 7.0 - 12.0 fL    Neutrophils % 57.1 43.0 - 80.0 %    Immature Granulocytes % 1.0 0.0 - 5.0 %    Lymphocytes % 28.6 20.0 - 42.0 %    Monocytes % 8.9 2.0 - 12.0 %    Eosinophils % 3.7 0.0 - 6.0 %    Basophils % 0.7 0.0 - 2.0 %    Neutrophils Absolute 4.04 1.80 - 7.30 E9/L    Immature Granulocytes # 0.07 E9/L    Lymphocytes Absolute 2.02 1.50 - 4.00 E9/L    Monocytes Absolute 0.63 0.10 - 0.95 E9/L    Eosinophils Absolute 0.26 0.05 - 0.50 E9/L    Basophils Absolute 0.05 0.00 - 0.20 E9/L   Comprehensive Metabolic Panel   Result Value Ref Range    Sodium 144 132 - 146 mmol/L    Potassium 3.8 3.5 - 5.0 mmol/L    Chloride 105 98 - 107 mmol/L    CO2 24 22 - 29 mmol/L    Anion Gap 15 7 - 16 mmol/L    Glucose 103 (H) 74 - 99 mg/dL    BUN 26 (H) 6 - 23 mg/dL    CREATININE 1.6 (H) 0.5 - 1.0 mg/dL    GFR Non-African American 31 >=60 mL/min/1.73    GFR African American 38     Calcium 9.7 8.6 - 10.2 mg/dL    Total Protein 7.5 6.4 - 8.3 g/dL    Albumin 4.2 3.5 - 5.2 g/dL    Total Bilirubin 0.3 0.0 - 1.2 mg/dL    Alkaline Phosphatase 132 (H) 35 - 104 U/L    ALT 16 0 - 32 U/L    AST 14 0 - 31 U/L   Troponin   Result Value Ref Range    Troponin, High Sensitivity 9 0 - 9 ng/L   Protime-INR   Result Value Ref Range    Protime 14.6 (H) 9.3 - 12.4 sec    INR 1.3    APTT   Result Value Ref Range    aPTT 39.4 (H) 24.5 - 35.1 sec   Urinalysis   Result Value Ref Range    Color, UA Yellow Straw/Yellow    Clarity, UA Clear Clear    Glucose, Ur 100 (A) Negative mg/dL    Bilirubin Urine Negative Negative    Ketones, Urine Negative Negative mg/dL    Specific Gravity, UA 1.010 1.005 - 1.030    Blood, Urine MODERATE (A) Negative    pH, UA 6.0 5.0 - 9.0    Protein, UA Negative Negative mg/dL    Urobilinogen, Urine 0.2 <2.0 E.U./dL    Nitrite, Urine Negative Negative    Leukocyte Esterase, Urine MODERATE (A) Negative   Lactate, Sepsis   Result Value Ref Range    Lactic Acid, Sepsis 1.0 0.5 - 1.9 mmol/L   Urine Drug Screen   Result Value Ref Range    Amphetamine Screen, Urine NOT DETECTED Negative <1000 ng/mL    Barbiturate Screen, Ur NOT DETECTED Negative < 200 ng/mL    Benzodiazepine Screen, Urine NOT DETECTED Negative < 200 ng/mL    Cannabinoid Scrn, Ur NOT DETECTED Negative < 50ng/mL    Cocaine Metabolite Screen, Urine NOT DETECTED Negative < 300 ng/mL    Opiate Scrn, Ur NOT DETECTED Negative < 300ng/mL    PCP Screen, Urine NOT DETECTED Negative < 25 ng/mL    Methadone Screen, Urine NOT DETECTED Negative <300 ng/mL    Oxycodone Urine NOT DETECTED Negative <100 ng/mL    FENTANYL SCREEN, URINE NOT DETECTED Negative <1 ng/mL    Drug Screen Comment: see below    Serum Drug Screen   Result Value Ref Range    Ethanol Lvl <10 mg/dL    Acetaminophen Level <5.0 (L) 10.0 - 11.5 mcg/mL    Salicylate, Serum <5.2 0.0 - 30.0 mg/dL    TCA Scrn NEGATIVE Cutoff:300 ng/mL   Valproic Acid Level, Total   Result Value Ref Range    Valproic Acid Lvl 28 (L) 50 - 100 mcg/mL   Brain Natriuretic Peptide   Result Value Ref Range    Pro- 0 - 450 pg/mL   Microscopic Urinalysis   Result Value Ref Range    WBC, UA 2-5 0 - 5 /HPF    RBC, UA 2-5 0 - 2 /HPF    Bacteria, UA FEW (A) None Seen /HPF   Troponin   Result Value Ref Range    Troponin, High Sensitivity 9 0 - 9 ng/L   Troponin   Result Value Ref Range    Troponin, High Sensitivity 10 (H) 0 - 9 ng/L   D-Dimer, Quantitative   Result Value Ref Range    D-Dimer, Quant <200 ng/mL DDU   POCT Glucose   Result Value Ref Range    Meter Glucose 113 (H) 74 - 99 mg/dL   POCT Glucose   Result Value Ref Range    Glucose 113 mg/dL    QC OK?  yes    POCT Glucose   Result Value Ref Range    Meter Glucose 119 (H) 74 - 99 mg/dL   POCT Glucose   Result Value Ref Range    Meter Glucose 94 74 - 99 mg/dL   EKG 12 Lead   Result Value Ref Range    Ventricular Rate 56 BPM    Atrial Rate 56 BPM    P-R Interval 236 ms    QRS Duration 168 ms    Q-T Interval 508 ms    QTc Calculation (Bazett) 490 ms    P Axis 40 degrees    R Axis 24 degrees    T Axis 36 degrees   ,     RADIOLOGY:  Interpreted by Radiologist unless otherwise specified  US DUP LOWER EXTREMITIES BILATERAL VENOUS   Final Result   Within the visualized vessels there is no evidence for deep venous   thrombosis               XR CHEST PORTABLE   Final Result   No acute process. CTA HEAD W CONTRAST   Final Result   1. Estimated stenosis of the proximal right and left internal carotid   artery by NASCET criteria is not hemodynamically significant   2. Mild atherosclerotic disease . 3. No large vessel occlusion identified            This study was analyzed by the [employer]. ai algorithm. CTA NECK W CONTRAST   Final Result   1. Estimated stenosis of the proximal right and left internal carotid   artery by NASCET criteria is not hemodynamically significant   2. Mild atherosclerotic disease . 3. No large vessel occlusion identified            This study was analyzed by the [employer]. ai algorithm. CT BRAIN PERFUSION   Final Result      No significant ischemic penumbra identified      This study was analyzed by the [employer]. ai algorithm. CT HEAD WO CONTRAST   Final Result   No acute intracranial abnormality. Findings discussed on 03/10/2022 with Dr. Jonatan Welch at 11:46 p.m. Mee Boothe RECOMMENDATIONS:   Unavailable         MRI BRAIN WO CONTRAST    (Results Pending)       ------------------------- NURSING NOTES AND VITALS REVIEWED ---------------------------   The nursing notes within the ED encounter and vital signs as below have been reviewed by myself  BP (!) 140/83   Pulse 59   Temp 97.5 °F (36.4 °C) (Temporal)   Resp 16   Ht 5' 6\" (1.676 m)   Wt 227 lb (103 kg)   SpO2 98%   BMI 36.64 kg/m²     Oxygen Saturation Interpretation: Normal    The patients available past medical records and past encounters were reviewed.       ------------------------------ ED COURSE/MEDICAL DECISION MAKING----------------------  Medications   allopurinol (ZYLOPRIM) tablet 100 mg (100 mg Oral Given 3/11/22 0919)   amLODIPine (NORVASC) tablet 5 mg (5 mg Oral Given 3/11/22 0919)   atorvastatin (LIPITOR) tablet 10 mg (10 mg Oral Given 3/11/22 0919)   divalproex (DEPAKOTE SPRINKLE) capsule 125 mg (125 mg Oral Given 3/11/22 1042)   DULoxetine (CYMBALTA) extended release capsule 30 mg (has no administration in time range)   gabapentin (NEURONTIN) capsule 300 mg (has no administration in time range)   levothyroxine (SYNTHROID) tablet 100 mcg (100 mcg Oral Given 3/11/22 0648)   rivaroxaban (XARELTO) tablet 20 mg (has no administration in time range)   rivastigmine (EXELON) capsule 3 mg (3 mg Oral Given 3/11/22 1042)   rOPINIRole (REQUIP) tablet 1 mg (1 mg Oral Given 3/11/22 1042)   topiramate (TOPAMAX) tablet 100 mg (has no administration in time range)   VORTIoxetine (TRINTELLIX) tablet 20 mg (20 mg Oral Given 3/11/22 0919)   sodium chloride flush 0.9 % injection 5-40 mL (10 mLs IntraVENous Given 3/11/22 0920)   sodium chloride flush 0.9 % injection 5-40 mL (has no administration in time range)   0.9 % sodium chloride infusion (has no administration in time range)   ondansetron (ZOFRAN-ODT) disintegrating tablet 4 mg (has no administration in time range)     Or   ondansetron (ZOFRAN) injection 4 mg (has no administration in time range)   polyethylene glycol (GLYCOLAX) packet 17 g (has no administration in time range)   acetaminophen (TYLENOL) tablet 650 mg (has no administration in time range)     Or   acetaminophen (TYLENOL) suppository 650 mg (has no administration in time range)   insulin lispro (HUMALOG) injection vial 0-6 Units (0 Units SubCUTAneous Not Given 3/11/22 1111)   insulin lispro (HUMALOG) injection vial 0-3 Units (has no administration in time range)   HYDROcodone-acetaminophen (NORCO) 5-325 MG per tablet 1 tablet (has no administration in time range)   cefTRIAXone (ROCEPHIN) 1,000 mg in sterile water 10 mL IV syringe (has no administration in time range)   0.9 % sodium chloride infusion ( IntraVENous New Bag 3/11/22 0915)   iopamidol (ISOVUE-370) 76 % injection 100 mL (100 mLs IntraVENous Given 3/10/22 9971)   cefTRIAXone (ROCEPHIN) 1,000 mg in sterile water 10 mL IV syringe (1,000 mg IntraVENous Given 3/11/22 0142)       Medical Decision Making: This is a 67 y.o. female presenting to the ED for altered mental status. On initial presentation, the patient's vital signs are interpreted as hypertensive, bradycardic, afebrile and saturating well on room air. Based on history and physical exam, we have a broad differential.  As the patient initially was thought to be within the stroke window stroke pager was activated. I cannot exclude intracranial process including hemorrhage or ischemia. However, the patient symptoms are more likely consistent with infectious process or metabolic cause. No known history of trauma. She is on Xarelto and nursing facility endorses compliance with this medication. We will not further evaluate for PE. We will assess for ACS, arrhythmia, pneumonia and UTI however. Review of EMR shows patient is on Depakote. We will assess these levels as well. A 12-lead EKG was performed and interpreted by myself. The rate is 56 with sinus bradycardia and normal axis. Patient has a Q-wave in lead III. To inversion in V1 and V2 as well as V3. The GA interval is 236, QRS interval is 168, and QTC interval is 490. No acute ST elevation or depression. Good R wave progression. This is sinus bradycardia with first-degree AV block, right bundle branch block and nonspecific abnormality. Point-of-care glucose 113. Laboratory studies show mild elevation of BUN and creatinine of 26 and 1.6. Review of EMR shows this is near baseline. Electrolytes otherwise within normal limits. Lactic acid normal.  BNP normal.  Troponin is 9. Alkaline phosphatase mildly elevated. LFTs otherwise within normal limits. Alcohol, 723 Tylenol and salicylate negative. Valproic acid low. No leukocytosis or anemia. Urine drug screen negative. CT of the head shows no acute intercranial abnormality. CTA and CT perfusion showed no large vessel occlusion or mismatch. No penumbra. Chest x-ray shows no acute process. This is interpreted by radiology.     I spoke with

## 2022-03-11 NOTE — PROGRESS NOTES
f-up visit patient admitted after midnight  97.5 (36.4) 16 59 140/83 -- High fowlers -- -- 98 None (Room air)      Awaits  Lower extrem dopplers venous  Echocardiogram  Mri brain  Per technician MRI screening form needs filled out

## 2022-03-11 NOTE — CARE COORDINATION
3/11 Care Coordination: Pt comes from the nursing home with concerns of stroke falling to the left, altered mental status. CM spoke with Pt's Legal Cammy Watkins. Confirmed discharge plan is back to [facility] when discharge. Spoke with [facility], They do not have transportation to pick pt up when discharged. Will need to set up. Call report to [phone] ext [phone]. Per Guardian pt's family only to get minimal information. [facility] in soft chart, will need [facility] set up for discharge transport. CM/IRINEO will continue to follow for discharge planning.    Craig ROWE,RN--BC  [phone]

## 2022-03-11 NOTE — ED NOTES
Report called to unit [facility], [address] [place] [address]  03/11/22 4015

## 2022-03-11 NOTE — PROGRESS NOTES
Physical Therapy    PT orders received and chart reviewed to attempt eval. Pt currently off unit for testing. PT will follow and attempt again at later time/date as able.  Thank you    Pablo Gonzalez, PT, DPT  [id number]

## 2022-03-12 LAB
ALBUMIN SERPL-MCNC: 3.3 G/DL (ref 3.5–5.2)
ALP BLD-CCNC: 105 U/L (ref 35–104)
ALT SERPL-CCNC: 12 U/L (ref 0–32)
ANION GAP SERPL CALCULATED.3IONS-SCNC: 11 MMOL/L (ref 7–16)
AST SERPL-CCNC: 10 U/L (ref 0–31)
BASOPHILS ABSOLUTE: 0.04 E9/L (ref 0–0.2)
BASOPHILS RELATIVE PERCENT: 0.7 % (ref 0–2)
BILIRUB SERPL-MCNC: <0.2 MG/DL (ref 0–1.2)
BUN BLDV-MCNC: 19 MG/DL (ref 6–23)
CALCIUM SERPL-MCNC: 9.4 MG/DL (ref 8.6–10.2)
CHLORIDE BLD-SCNC: 109 MMOL/L (ref 98–107)
CO2: 21 MMOL/L (ref 22–29)
CREAT SERPL-MCNC: 1.4 MG/DL (ref 0.5–1)
EOSINOPHILS ABSOLUTE: 0.3 E9/L (ref 0.05–0.5)
EOSINOPHILS RELATIVE PERCENT: 5.2 % (ref 0–6)
GFR AFRICAN AMERICAN: 44
GFR NON-AFRICAN AMERICAN: 36 ML/MIN/1.73
GLUCOSE BLD-MCNC: 91 MG/DL (ref 74–99)
HCT VFR BLD CALC: 38.6 % (ref 34–48)
HEMOGLOBIN: 12.8 G/DL (ref 11.5–15.5)
IMMATURE GRANULOCYTES #: 0.03 E9/L
IMMATURE GRANULOCYTES %: 0.5 % (ref 0–5)
LYMPHOCYTES ABSOLUTE: 1.33 E9/L (ref 1.5–4)
LYMPHOCYTES RELATIVE PERCENT: 23 % (ref 20–42)
MCH RBC QN AUTO: 32.2 PG (ref 26–35)
MCHC RBC AUTO-ENTMCNC: 33.2 % (ref 32–34.5)
MCV RBC AUTO: 97.2 FL (ref 80–99.9)
METER GLUCOSE: 110 MG/DL (ref 74–99)
METER GLUCOSE: 124 MG/DL (ref 74–99)
METER GLUCOSE: 90 MG/DL (ref 74–99)
METER GLUCOSE: 99 MG/DL (ref 74–99)
MONOCYTES ABSOLUTE: 0.43 E9/L (ref 0.1–0.95)
MONOCYTES RELATIVE PERCENT: 7.4 % (ref 2–12)
NEUTROPHILS ABSOLUTE: 3.65 E9/L (ref 1.8–7.3)
NEUTROPHILS RELATIVE PERCENT: 63.2 % (ref 43–80)
PDW BLD-RTO: 12.8 FL (ref 11.5–15)
PLATELET # BLD: 229 E9/L (ref 130–450)
PMV BLD AUTO: 9.3 FL (ref 7–12)
POTASSIUM REFLEX MAGNESIUM: 4 MMOL/L (ref 3.5–5)
RBC # BLD: 3.97 E12/L (ref 3.5–5.5)
SODIUM BLD-SCNC: 141 MMOL/L (ref 132–146)
TOTAL PROTEIN: 6 G/DL (ref 6.4–8.3)
WBC # BLD: 5.8 E9/L (ref 4.5–11.5)

## 2022-03-12 PROCEDURE — 2580000003 HC RX 258: Performed by: INTERNAL MEDICINE

## 2022-03-12 PROCEDURE — 96376 TX/PRO/DX INJ SAME DRUG ADON: CPT

## 2022-03-12 PROCEDURE — 80053 COMPREHEN METABOLIC PANEL: CPT

## 2022-03-12 PROCEDURE — 6360000002 HC RX W HCPCS: Performed by: INTERNAL MEDICINE

## 2022-03-12 PROCEDURE — 6370000000 HC RX 637 (ALT 250 FOR IP): Performed by: INTERNAL MEDICINE

## 2022-03-12 PROCEDURE — G0378 HOSPITAL OBSERVATION PER HR: HCPCS

## 2022-03-12 PROCEDURE — 36415 COLL VENOUS BLD VENIPUNCTURE: CPT

## 2022-03-12 PROCEDURE — 87088 URINE BACTERIA CULTURE: CPT

## 2022-03-12 PROCEDURE — 85025 COMPLETE CBC W/AUTO DIFF WBC: CPT

## 2022-03-12 PROCEDURE — 97165 OT EVAL LOW COMPLEX 30 MIN: CPT

## 2022-03-12 PROCEDURE — 96361 HYDRATE IV INFUSION ADD-ON: CPT

## 2022-03-12 PROCEDURE — 82962 GLUCOSE BLOOD TEST: CPT

## 2022-03-12 RX ADMIN — GABAPENTIN 300 MG: 300 CAPSULE ORAL at 20:41

## 2022-03-12 RX ADMIN — ATORVASTATIN CALCIUM 10 MG: 10 TABLET, FILM COATED ORAL at 08:09

## 2022-03-12 RX ADMIN — ROPINIROLE HYDROCHLORIDE 1 MG: 1 TABLET, FILM COATED ORAL at 08:08

## 2022-03-12 RX ADMIN — VORTIOXETINE 20 MG: 10 TABLET, FILM COATED ORAL at 08:08

## 2022-03-12 RX ADMIN — RIVASTIGMINE TARTRATE 3 MG: 3 CAPSULE ORAL at 08:09

## 2022-03-12 RX ADMIN — DIVALPROEX SODIUM 125 MG: 125 CAPSULE, COATED PELLETS ORAL at 08:08

## 2022-03-12 RX ADMIN — TOPIRAMATE 100 MG: 100 TABLET, FILM COATED ORAL at 20:41

## 2022-03-12 RX ADMIN — AMLODIPINE BESYLATE 5 MG: 5 TABLET ORAL at 08:09

## 2022-03-12 RX ADMIN — ALLOPURINOL 100 MG: 100 TABLET ORAL at 08:09

## 2022-03-12 RX ADMIN — ROPINIROLE HYDROCHLORIDE 1 MG: 1 TABLET, FILM COATED ORAL at 16:54

## 2022-03-12 RX ADMIN — RIVAROXABAN 20 MG: 20 TABLET, FILM COATED ORAL at 16:54

## 2022-03-12 RX ADMIN — DIVALPROEX SODIUM 125 MG: 125 CAPSULE, COATED PELLETS ORAL at 20:41

## 2022-03-12 RX ADMIN — ACETAMINOPHEN 650 MG: 325 TABLET ORAL at 16:54

## 2022-03-12 RX ADMIN — LEVOTHYROXINE SODIUM 100 MCG: 0.1 TABLET ORAL at 08:13

## 2022-03-12 RX ADMIN — RIVASTIGMINE TARTRATE 3 MG: 3 CAPSULE ORAL at 20:41

## 2022-03-12 RX ADMIN — SODIUM CHLORIDE: 9 INJECTION, SOLUTION INTRAVENOUS at 23:06

## 2022-03-12 RX ADMIN — ROPINIROLE HYDROCHLORIDE 1 MG: 1 TABLET, FILM COATED ORAL at 20:41

## 2022-03-12 RX ADMIN — DULOXETINE HYDROCHLORIDE 30 MG: 30 CAPSULE, DELAYED RELEASE ORAL at 20:41

## 2022-03-12 RX ADMIN — WATER 1000 MG: 1 INJECTION INTRAMUSCULAR; INTRAVENOUS; SUBCUTANEOUS at 20:43

## 2022-03-12 ASSESSMENT — PAIN SCALES - GENERAL
PAINLEVEL_OUTOF10: 0
PAINLEVEL_OUTOF10: 6
PAINLEVEL_OUTOF10: 0

## 2022-03-12 NOTE — PROGRESS NOTES
mg Oral Daily    sodium chloride flush  5-40 mL IntraVENous 2 times per day    insulin lispro  0-6 Units SubCUTAneous TID WC    insulin lispro  0-3 Units SubCUTAneous Nightly    cefTRIAXone (ROCEPHIN) IV  1,000 mg IntraVENous Q24H     PRN Meds: sodium chloride flush, sodium chloride, ondansetron **OR** ondansetron, polyethylene glycol, acetaminophen **OR** acetaminophen, HYDROcodone 5 mg - acetaminophen    Labs:     Recent Labs     03/10/22  2250 03/12/22  0543   WBC 7.1 5.8   HGB 14.9 12.8   HCT 45.1 38.6    229       Recent Labs     03/10/22  2250 03/12/22  0543    141   K 3.8 4.0    109*   CO2 24 21*   BUN 26* 19   CREATININE 1.6* 1.4*   CALCIUM 9.7 9.4       Recent Labs     03/10/22  2250 03/12/22  0543   PROT 7.5 6.0*   ALKPHOS 132* 105*   ALT 16 12   AST 14 10   BILITOT 0.3 <0.2       Recent Labs     03/10/22  2250   INR 1.3         Radiology:   +++++++++++++++++++++++++++++++++++++++++++++++++  DO Arlyn Martini Physician - [place] [address]  +++++++++++++++++++++++++++++++++++++++++++++++++  NOTE: This report was transcribed using voice recognition software. Every effort was made to ensure accuracy; however, inadvertent computerized transcription errors may be present.

## 2022-03-12 NOTE — CARE COORDINATION
SOCIAL WORK/CASEMANAGEMENT TRANSITION OF CARE [id number] [place]  [facility] [address], [place], [phone]): spoke with nae craig this a.m. she said she is the legal guardian and emailed me the court document which I printed and provided to the rn. Rossy Bran also said pt is to remain a full code.  Kj Ta, CHERY  3/12/2022

## 2022-03-12 NOTE — PROGRESS NOTES
Occupational Therapy  OCCUPATIONAL THERAPY INITIAL EVALUATION     Basia Sutton [employer] [address] [address]      Date:3/12/2022                                                Patient Name: Malia Mims  MRN: [id number]  : 1945  Room: 8210/[address]    Referring Provider: Delfina Clemente DO  Specific Provider Orders/Date: OT eval and treat 22    Diagnosis: Metabolic encephalopathy [S24.99]   Pt admitted to hospital on 3/10/22 for fatigue, AMS    Pertinent Medical History:  has a past medical history of Acute bronchitis, Acute on chronic diastolic (congestive) heart failure ([address] Acute renal failure ([address] Acute renal failure with acute cortical necrosis ([address] Acute-on-chronic kidney injury ([address] Arthritis, Atrial fibrillation ([address] CAD (coronary artery disease), Cancer ([address] CHF (congestive heart failure) ([address] Dementia ([address] Depression, Diabetes mellitus ([address] Enteritis, H/O echocardiogram, History of blood transfusion, History of cardiovascular stress test, History of cardiovascular stress test, History of echocardiogram, History of echocardiogram, Hyperkalemia, Hyperlipidemia, Hypertension, 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 syndrome, Stroke ([address] Unspecified cerebral artery occlusion with cerebral infarction, and Unstable angina ([address]        Precautions:  Fall Risk, [place], bed alarm    Assessment of current deficits    [x] Functional mobility  [x]ADLs  [x] Strength               [x]Cognition    [x] Functional transfers   [x] IADLs         [x] Safety Awareness   [x]Endurance    [] Fine Coordination              [x] Balance      [] Vision/perception   []Sensation     []Gross Motor Coordination  [] ROM  [] Delirium                   [] Motor Control     OT PLAN OF CARE   OT POC based on physician orders, patient diagnosis and results of clinical assessment    Frequency/Duration 1-3 days/wk for 2 weeks PRN   Specific OT Treatment Interventions to include:   * Instruction/training on adapted ADL techniques and AE recommendations to increase functional independence within precautions       * Training on energy conservation strategies, correct breathing pattern and techniques to improve independence/tolerance for self-care routine  * Functional transfer/mobility training/DME recommendations for increased independence, safety, and fall prevention  * Patient/Family education to increase follow through with safety techniques and functional independence  * Recommendation of environmental modifications for increased safety with functional transfers/mobility and ADLs  * Cognitive retraining/development of therapeutic activities to improve problem solving, judgement, memory, and attention for increased safety/participation in ADL/IADL tasks  * Therapeutic exercise to improve motor endurance, ROM, and functional strength for ADLs/functional transfers  * Therapeutic activities to facilitate/challenge dynamic balance, stand tolerance for increased safety and independence with ADLs  * Therapeutic activities to facilitate gross/fine motor skills for increased independence with ADLs    Recommended Adaptive Equipment: TBD     Home Living: Pt admitted from half-way   Bathroom setup: walk in shower with shower seat and grab bars throughout    Equipment owned: w/w    Prior Level of Function: independent/mod I with ADLs , shares IADLs (w/ AL staff); ambulated w/ w/w   Driving: no    Pain Level: Pt c/o no pain this session    Cognition: A&O: 3/4 (mild situational confusion noted);  Follows 1 step directions   Memory:  fair    Sequencing:  fair    Problem solving:  fair    Judgement/safety:  fair      Functional Assessment:  AM-PAC Daily Activity Raw Score: 16/24   Initial Eval Status  Date: 3/12/22 Treatment Status  Date: STGs = LTGs  Time frame: 10-14 days   Feeding Independent      Grooming Minimal Assist   Modified [place]    UB Dressing Minimal Assist   Modified [place]    LB Dressing Moderate Assist   Supervision    Bathing Moderate Assist  Supervision    Toileting Moderate Assist   Supervision    Bed Mobility  Supine to sit: Minimal Assist   Sit to supine: Stand by Assist   Supine to sit: Modified [place]   Sit to supine: Modified [place]    Functional Transfers Minimal Assist   Modified [place]    Functional Mobility Minimal Assist w/ w/w  Light mobility in room  Modified [place]    Balance Sitting:     Static:  Supervision    Dynamic:SBA  Standing: SBA w/ w/w (static)  Min A w/ w/w (dynamic)     Activity Tolerance Fair  Good   Visual/  Perceptual Glasses: no                  Hand Dominance R   AROM (PROM) Strength Additional Info:    RUE  WFL 3+/5 good  and wfl FMC/dexterity noted during ADL tasks       LUE WFL 3+/5 good  and wfl FMC/dexterity noted during ADL tasks       Hearing: WFL  Sensation:  No c/o numbness or tingling   Tone: WFL   Edema: B LE's    Comments: Upon arrival patient lying inbed. Pt agreeable to OT session this date. Therapist educated pt on role of OT. At end of session, patient lying in bed (bed alarm on) with call light and phone within reach, all lines and tubes intact. Overall patient demonstrated decreased independence and safety during completion of ADL/functional transfer/mobility tasks. Pt would benefit from continued skilled OT to increase safety and independence with completion of ADL/IADL tasks for functional independence and quality of life.     Treatment: OT treatment provided this date includes: Facilitation of bed mobility (education/cues for body mechanics), unsupported sitting balance (addressing posture, weight shifting, dynamic reaching to prep for ADL's), functional transfers (w/ education/cues for safety/hand placement), standing tolerance tasks (addressing posture, balance and activity tolerance while incorporating light functional reaching impacting ADLs and functional activity) and functional ambulation tasks with w/w (w/ education/cuing on posture, w/w management and safety). Therapist facilitated self-care retraining: UB/LB self-care tasks, toileting task and standing grooming tasks while educating/cuing pt on modified techniques, posture, safety and energy conservation techniques. Skilled monitoring of HR, O2 sats and pts response to treatment. Rehab Potential: Good for established goals     Patient / Family Goal: not stated      Patient and/or family were instructed on functional diagnosis, prognosis/goals and OT plan of care. Demonstrated fair understanding. Eval Complexity: Low    Time In: 11:49  Time Out: 12:10    Min Units   OT Eval Low 97165 X  1   OT Eval Medium 43418      OT Eval High 75006      OT Re-Eval [id number]       Therapeutic Ex 19244       Therapeutic Activities 80535       ADL/Self Care 64227       Orthotic Management 79853       Manual 75539     Neuro Re-Ed 13936       Non-Billable Time          Evaluation Time additionally includes thorough review of current medical information, gathering information on past medical history/social history and prior level of function, interpretation of standardized testing/informal observation of tasks, assessment of data and development of plan of care and goals.         Andry OTR/L #[id number]

## 2022-03-13 VITALS
SYSTOLIC BLOOD PRESSURE: 150 MMHG | HEIGHT: 66 IN | HEART RATE: 71 BPM | TEMPERATURE: 96.9 F | WEIGHT: 227 LBS | RESPIRATION RATE: 17 BRPM | DIASTOLIC BLOOD PRESSURE: 78 MMHG | BODY MASS INDEX: 36.48 KG/M2 | OXYGEN SATURATION: 96 %

## 2022-03-13 LAB
ALBUMIN SERPL-MCNC: 3.2 G/DL (ref 3.5–5.2)
ALP BLD-CCNC: 98 U/L (ref 35–104)
ALT SERPL-CCNC: 12 U/L (ref 0–32)
ANION GAP SERPL CALCULATED.3IONS-SCNC: 11 MMOL/L (ref 7–16)
AST SERPL-CCNC: 12 U/L (ref 0–31)
BASOPHILS ABSOLUTE: 0.06 E9/L (ref 0–0.2)
BASOPHILS RELATIVE PERCENT: 1 % (ref 0–2)
BILIRUB SERPL-MCNC: <0.2 MG/DL (ref 0–1.2)
BUN BLDV-MCNC: 18 MG/DL (ref 6–23)
CALCIUM SERPL-MCNC: 9.1 MG/DL (ref 8.6–10.2)
CHLORIDE BLD-SCNC: 114 MMOL/L (ref 98–107)
CO2: 19 MMOL/L (ref 22–29)
CREAT SERPL-MCNC: 1.4 MG/DL (ref 0.5–1)
EOSINOPHILS ABSOLUTE: 0.33 E9/L (ref 0.05–0.5)
EOSINOPHILS RELATIVE PERCENT: 5.4 % (ref 0–6)
GFR AFRICAN AMERICAN: 44
GFR NON-AFRICAN AMERICAN: 36 ML/MIN/1.73
GLUCOSE BLD-MCNC: 99 MG/DL (ref 74–99)
HCT VFR BLD CALC: 39 % (ref 34–48)
HEMOGLOBIN: 12.5 G/DL (ref 11.5–15.5)
IMMATURE GRANULOCYTES #: 0.04 E9/L
IMMATURE GRANULOCYTES %: 0.7 % (ref 0–5)
LYMPHOCYTES ABSOLUTE: 1.5 E9/L (ref 1.5–4)
LYMPHOCYTES RELATIVE PERCENT: 24.5 % (ref 20–42)
MCH RBC QN AUTO: 31.6 PG (ref 26–35)
MCHC RBC AUTO-ENTMCNC: 32.1 % (ref 32–34.5)
MCV RBC AUTO: 98.5 FL (ref 80–99.9)
METER GLUCOSE: 128 MG/DL (ref 74–99)
METER GLUCOSE: 88 MG/DL (ref 74–99)
MONOCYTES ABSOLUTE: 0.58 E9/L (ref 0.1–0.95)
MONOCYTES RELATIVE PERCENT: 9.5 % (ref 2–12)
NEUTROPHILS ABSOLUTE: 3.62 E9/L (ref 1.8–7.3)
NEUTROPHILS RELATIVE PERCENT: 58.9 % (ref 43–80)
PDW BLD-RTO: 12.9 FL (ref 11.5–15)
PLATELET # BLD: 219 E9/L (ref 130–450)
PMV BLD AUTO: 9.1 FL (ref 7–12)
POTASSIUM REFLEX MAGNESIUM: 4.1 MMOL/L (ref 3.5–5)
RBC # BLD: 3.96 E12/L (ref 3.5–5.5)
SODIUM BLD-SCNC: 144 MMOL/L (ref 132–146)
TOTAL PROTEIN: 6 G/DL (ref 6.4–8.3)
VITAMIN B-12: 299 PG/ML (ref 211–946)
WBC # BLD: 6.1 E9/L (ref 4.5–11.5)

## 2022-03-13 PROCEDURE — 85025 COMPLETE CBC W/AUTO DIFF WBC: CPT

## 2022-03-13 PROCEDURE — G0378 HOSPITAL OBSERVATION PER HR: HCPCS

## 2022-03-13 PROCEDURE — 6370000000 HC RX 637 (ALT 250 FOR IP): Performed by: INTERNAL MEDICINE

## 2022-03-13 PROCEDURE — 36415 COLL VENOUS BLD VENIPUNCTURE: CPT

## 2022-03-13 PROCEDURE — 82607 VITAMIN B-12: CPT

## 2022-03-13 PROCEDURE — 82962 GLUCOSE BLOOD TEST: CPT

## 2022-03-13 PROCEDURE — 80053 COMPREHEN METABOLIC PANEL: CPT

## 2022-03-13 RX ORDER — NITROFURANTOIN 25; 75 MG/1; MG/1
100 CAPSULE ORAL EVERY 12 HOURS SCHEDULED
Qty: 14 CAPSULE | Refills: 0 | Status: SHIPPED | OUTPATIENT
Start: 2022-03-13 | End: 2022-03-20

## 2022-03-13 RX ORDER — NITROFURANTOIN 25; 75 MG/1; MG/1
100 CAPSULE ORAL EVERY 12 HOURS SCHEDULED
Status: DISCONTINUED | OUTPATIENT
Start: 2022-03-13 | End: 2022-03-13 | Stop reason: HOSPADM

## 2022-03-13 RX ADMIN — AMLODIPINE BESYLATE 5 MG: 5 TABLET ORAL at 09:00

## 2022-03-13 RX ADMIN — VORTIOXETINE 20 MG: 10 TABLET, FILM COATED ORAL at 09:02

## 2022-03-13 RX ADMIN — ROPINIROLE HYDROCHLORIDE 1 MG: 1 TABLET, FILM COATED ORAL at 09:02

## 2022-03-13 RX ADMIN — ALLOPURINOL 100 MG: 100 TABLET ORAL at 09:00

## 2022-03-13 RX ADMIN — ATORVASTATIN CALCIUM 10 MG: 10 TABLET, FILM COATED ORAL at 09:00

## 2022-03-13 RX ADMIN — LEVOTHYROXINE SODIUM 100 MCG: 0.1 TABLET ORAL at 05:50

## 2022-03-13 RX ADMIN — RIVASTIGMINE TARTRATE 3 MG: 3 CAPSULE ORAL at 09:03

## 2022-03-13 RX ADMIN — DIVALPROEX SODIUM 125 MG: 125 CAPSULE, COATED PELLETS ORAL at 09:03

## 2022-03-13 ASSESSMENT — PAIN SCALES - GENERAL: PAINLEVEL_OUTOF10: 0

## 2022-03-13 NOTE — CARE COORDINATION
Received call from [place] pt needs transportation set-up for discharge. This CM set pt up with PAS pick-up at 1400, envelope and ambulance form completed, RN updated by this CM on unit.

## 2022-03-13 NOTE — DISCHARGE INSTR - COC
Continuity of Care Form    Patient Name: Margie Malik   :  [birth year]  MRN:  [id number]    Admit date:  3/10/2022  Discharge date:  ***    Code Status Order: Full Code   Advance Directives:      Admitting Physician:  Elizabet Pompa MD  PCP: Samantha Richards MD    Discharging Nurse: [facility] Unit/Room#: 8210/8210-B  Discharging Unit Phone Number: ***    Emergency Contact:   Extended Emergency Contact Information  Primary Emergency Contact: Collie Skiff [address] Phone: [phone]  Relation: Child   needed? No  Secondary Emergency Contact: [facility] Phone: [phone]  Mobile Phone: [phone]  Relation: Legal Guardian   needed? No    Past Surgical History:  Past Surgical History:   Procedure Laterality Date    CARDIAC SURGERY      CHOLECYSTECTOMY      DIAGNOSTIC CARDIAC CATH LAB PROCEDURE  09/10/2009    LV 70%. stenotic CAD. Previously placed BM stent: LAD-patent. Successful GUILLERMO of LAD: mid    DIAGNOSTIC CARDIAC CATH LAB PROCEDURE  10/11/2007    LVF well preserved, EF 70%. No MR. Patent coronary arteries. Patent stented segments in LAD. DIAGNOSTIC CARDIAC CATH LAB PROCEDURE  2006    Patent stents in proximal and mid LAD with mild instent stenosis. Mild CAD of mid LAD 30% stenosis. Normal LVSF, EF 60%. DIAGNOSTIC CARDIAC CATH LAB PROCEDURE  2003    EF and contraction pattern normal. No MR. Normal LVF. Atherosclerotic CAD.      ECHO COMPLETE  2014 EF62%    stage I diastolic    GALLBLADDER SURGERY      HERNIA REPAIR      HIATAL HERNIA REPAIR      HYSTERECTOMY      JOINT REPLACEMENT Bilateral     knee    THYROID SURGERY      THYROIDECTOMY         Immunization History:   Immunization History   Administered Date(s) Administered    Influenza Vaccine, unspecified formulation 2015    Influenza, Quadv, IM, PF (6 mo and older Fluzone, Flulaval, Fluarix, and 3 yrs and older Afluria) 2017    Pneumococcal Conjugate 13-valent (Bskbarv21) 02/05/2019    Pneumococcal Polysaccharide (Efidcnowt54) 04/15/2016       Active Problems:  Patient Active Problem List   Diagnosis Code    Essential hypertension I10    Coronary artery disease involving native coronary artery without angina pectoris I25.10    Depression F32. A    Dyslipidemia E78.5    Restless leg syndrome G25.81    Hypothyroidism E03.9    Peripheral edema R60.9    Chronic kidney disease, stage III (moderate) ([facility]) N18.30    Morbid obesity ([facility]) E66.01    Left upper extremity numbness R20.0    Lymphedema of both lower extremities I89.0    Chronic diastolic heart failure (HCC) I50.32    Paroxysmal atrial fibrillation ([facility]) I48.0    First degree atrioventricular block I44.0    Anasarca R60.1    Prediabetes R73.03    Failure to thrive (0-17) R62.51    Cystitis N30.90    Vascular dementia ([facility] F01.50    Ataxia R27.0    Ambulatory dysfunction R26.2    Left knee pain M25.562    Stroke-like symptom K60.07    Metabolic encephalopathy I35.03       Isolation/Infection:   Isolation            No Isolation          Patient Infection Status       Infection Onset Added Last Indicated Last Indicated By Review Planned Expiration Resolved Resolved By    None active    Resolved    COVID-19 (Rule Out) 09/08/21 09/08/21 09/08/21 COVID-19 (Ordered)   09/10/21 Rule-Out Test Resulted    COVID-19 (Rule Out) 05/05/20 05/05/20 05/05/20 COVID-19 (Ordered)   05/05/20 Rule-Out Test Resulted            Nurse Assessment:  Last Vital Signs: BP (!) 150/78   Pulse 71   Temp 96.9 °F (36.1 °C) (Temporal)   Resp 17   Ht 5' 6\" (1.676 m)   Wt 227 lb (103 kg)   SpO2 96%   BMI 36.64 kg/m²     Last documented pain score (0-10 scale): Pain Level: 0  Last Weight:   Wt Readings from Last 1 Encounters:   03/10/22 227 lb (103 kg)     Mental Status:  {IP PT MENTAL STATUS:20030}    IV Access:  {[facility] IV ACCESS:664516727}    Nursing Mobility/ADLs:  Walking   {[facility] IFJU:172102582}  Transfer  {[facility] DME LUHN:645547083}  Bathing  {[facility] DME BOQT:949040886}  Dressing  {[facility] DME NWYW:411638604}  Toileting  {[facility] DME CECB:801658162}  Feeding  {[facility] DME PGPX:602248741}  Med Admin  {[facility] DME OTB}  Med Delivery   { [facility] MED Delivery:130663673}    Wound Care Documentation and Therapy:        Elimination:  Continence: Bowel: {YES / AE:00249}  Bladder: {YES / GT:82563}  Urinary Catheter: {Urinary Catheter:193635439}   Colostomy/Ileostomy/Ileal Conduit: {YES / AF:88741}       Date of Last BM: ***    Intake/Output Summary (Last 24 hours) at 3/13/2022 1230  Last data filed at 3/13/2022 0548  Gross per 24 hour   Intake 1877 ml   Output 300 ml   Net 1577 ml     I/O last 3 completed shifts:   In: 1877 [I.V.:1877]  Out: 300 [Urine:300]    Safety Concerns:     [address] [employer] [address] [employer] Safety Concerns:329470428}    Impairments/Disabilities:      [address] Impairments/Disabilities:300647734}    Nutrition Therapy:  Current Nutrition Therapy:   [address] [employer] [address] [employer] Diet List:247997490}    Routes of Feeding: {[facility] DME Other Feedings:187788808}  Liquids: {Slp liquid thickness:20322}  Daily Fluid Restriction: {[facility] DME Yes amt example:604603261}  Last Modified Barium Swallow with Video (Video Swallowing Test): {Done Not Done VSKR:203009366}    Treatments at the Time of Hospital Discharge:   Respiratory Treatments: ***  Oxygen Therapy:  {Therapy; copd oxygen:70455}  Ventilator:    {[facility] Vent BOHT:835687650}    Rehab Therapies: {THERAPEUTIC INTERVENTION:1626655514}  Weight Bearing Status/Restrictions: [employer] Weight Bearin}  Other Medical Equipment (for information only, NOT a DME order):  {EQUIPMENT:683999324}  Other Treatments: ***    Patient's personal belongings (please select all that are sent with patient):  {[facility] DME Belongings:140760105}    RN SIGNATURE:  {Esignature:861195032}    CASE MANAGEMENT/SOCIAL WORK SECTION    Inpatient Status Date: ***    Readmission Risk Assessment Score:  Readmission Risk              Risk of Unplanned Readmission:  0 Discharging to Facility/ Agency   Name:   Address:  Phone:  Fax:    Dialysis Facility (if applicable)   Name:  Address:  Dialysis Schedule:  Phone:  Fax:    / signature: {Esignature:498358704}    PHYSICIAN SECTION    Prognosis: Fair    Condition at Discharge: Stable    Rehab Potential (if transferring to Rehab): Good    Recommended Labs or Other Treatments After Discharge: follow up on urine culture results as well as TSH/folate/vit M26    Physician Certification: I certify the above information and transfer of Molli Schaumann  is necessary for the continuing treatment of the diagnosis listed and that she requires [facility] for greater 30 days.      Update Admission H&P: No change in H&P    PHYSICIAN SIGNATURE:  Electronically signed by Walker Forbes DO on 3/13/22 at 12:31 PM EDT

## 2022-03-13 NOTE — PLAN OF CARE
Problem: Falls - Risk of:  Goal: Will remain free from falls  Description: Will remain free from falls  Outcome: Ongoing  Goal: Absence of physical injury  Description: Absence of physical injury  Outcome: Ongoing     Problem: Injury - Risk of, Healthcare-Acquired Condition:  Goal: Will remain free from falls  Description: Will remain free from falls  Outcome: Ongoing     Problem: Urinary Elimination:  Goal: Complications related to the disease process, condition or treatment will be avoided or minimized  Description: Complications related to the disease process, condition or treatment will be avoided or minimized  Outcome: Ongoing

## 2022-03-13 NOTE — PROGRESS NOTES
Hospitalist Progress Note      SYNOPSIS: Patient admitted on 7/17/2692 for Metabolic encephalopathy  Staff at [facility] sent ptn in due to  Altered mentation and\" facial droop\"    SUBJECTIVE:\" states she felt she was in a fog\"  Denies syncope      OBJECTIVE: 97% room air    BP (!) 150/78   Pulse 71   Temp 96.9 °F (36.1 °C) (Temporal)   Resp 17   Ht 5' 6\" (1.676 m)   Wt 227 lb (103 kg)   SpO2 96%   BMI 36.64 kg/m²   Mews 1 based on vs above  General appearance: elderly not diaphoretic  HEENT: no thrush sinus not tender to percussion or palpation  Respiratory: no wheeze  Cardiovascular: audible  S1-S2 no murmur  Neurologic: awake, alert and speech fluid    Can tell me her full name and age [de-identified] and location    ASSESSMENT: I informed patient her mri did not reveal evid of new cva  And that urine cult results not available yet   she mentions that she was diagnosed with uti a few days ago  States \"I know that', I have a UTI\"    Altered mental status-resolved  afib by hc  hcvd  chf possibly hfpef/diastolic  Cad  Hx of Unstable angina  ckd stage III  Low hco3 level  One isolated tropon level above the last 2 tropo levls  (9)  htn  dm2  hld  Hypothyroid  Lymphedema lower extremit  Mood disorder  Dementia by hx  Hx of cva   Rest leg syndrome   class III obesity  PLAN:dc back to ecf  We can add vit b12 and folate and tsh  Empiric rx with macrobid      Avoid nephrotox agents  Contin statin  Contin Xarelto  Contin amlodipine  Contin synthroid  Contin Prozac  monit bs for dm2  Insulin regimen basal bolus insulin for dm  ch controlled diet for dm2    Follow up on lower extremity dopplers and 2 d echo    DISPOSITION: tbd    Medications:  REVIEWED DAILY    Infusion Medications    sodium chloride      sodium chloride 50 mL/hr at 03/12/22 2306     Scheduled Medications    nitrofurantoin (macrocrystal-monohydrate)  100 mg Oral 2 times per day    allopurinol  100 mg Oral Daily    amLODIPine  5 mg Oral Daily    atorvastatin  10 mg Oral Daily    divalproex  125 mg Oral BID    DULoxetine  30 mg Oral Nightly    gabapentin  300 mg Oral Nightly    levothyroxine  100 mcg Oral Daily    rivaroxaban  20 mg Oral Dinner    rivastigmine  3 mg Oral BID    rOPINIRole  1 mg Oral TID    topiramate  100 mg Oral Nightly    VORTIoxetine HBr  20 mg Oral Daily    sodium chloride flush  5-40 mL IntraVENous 2 times per day    insulin lispro  0-6 Units SubCUTAneous TID WC    insulin lispro  0-3 Units SubCUTAneous Nightly    cefTRIAXone (ROCEPHIN) IV  1,000 mg IntraVENous Q24H     PRN Meds: sodium chloride flush, sodium chloride, ondansetron **OR** ondansetron, polyethylene glycol, acetaminophen **OR** acetaminophen, HYDROcodone 5 mg - acetaminophen    Labs:     Recent Labs     03/10/22  2250 03/12/22  0543 03/13/22  0532   WBC 7.1 5.8 6.1   HGB 14.9 12.8 12.5   HCT 45.1 38.6 39.0    229 219       Recent Labs     03/10/22  2250 03/12/22  0543 03/13/22  0532    141 144   K 3.8 4.0 4.1    109* 114*   CO2 24 21* 19*   BUN 26* 19 18   CREATININE 1.6* 1.4* 1.4*   CALCIUM 9.7 9.4 9.1       Recent Labs     03/10/22  2250 03/12/22  0543 03/13/22  0532   PROT 7.5 6.0* 6.0*   ALKPHOS 132* 105* 98   ALT 16 12 12   AST 14 10 12   BILITOT 0.3 <0.2 <0.2       Recent Labs     03/10/22  2250   INR 1.3         Radiology: venous dopplers 3/11/2022- no evid of dvt  +++++++++++++++++++++++++++++++++++++++++++++++++  Terry Barillas,   [address], [place]  +++++++++++++++++++++++++++++++++++++++++++++++++  NOTE: This report was transcribed using voice recognition software. Every effort was made to ensure accuracy; however, inadvertent computerized transcription errors may be present.

## 2022-03-13 NOTE — DISCHARGE SUMMARY
Hospitalist Discharge Summary    Patient ID: Johanny Griffin   Patient : 1945  Patient's PCP: Micaela Nunes MD    Admit Date: 3/10/2022   Admitting Physician: Taryn Felton MD    Discharge Date:  3/13/2022  Discharge Physician: Spencer Parikh DO   Discharge Condition: Stable  Discharge Disposition: [facility] (Non-Skilled)  [facility]  History of presenting illness:  Chief Complaint:  had concerns including Fatigue   This is a 71-year-old white female who was brought in from nursing home because of altered mental status.   Staff noted facial droop  Initial CTA head and neck and [address] and CT perfusi did not reeval evid of acute cva  She was deemed not to be a candidate for tPA by the initial treating [facility] neurospecialist  Hospital course in brief:  (Please refer to daily progress notes for a comprehensive review of the hospitalization by requesting medical records)  Remained in the hospital for several days  Evaluated initially by hospitalist al  Assessment initially included the following  Acute metabolic encephalopathy  Acute UTI unspecified organism or location likely contributing to metabolic encephalopathy  Hypovolemia  Chronic kidney disease stage IIIb: Serum creatinine 1.6, appears to be at baseline  Trace bilateral lower extremity edema:   Essential hypertension   Hyperlipidemia:     Chronic atrial fibrillation:     Hypothyroidism:      Depression:    dementia    Diabetes type 2 with peripheral neuropathy    Patient received treatment with ivf NS 50ml/hr as well as ceftriaxone iv for suspected uti  Amlodipine for htn and continuation of xarelto for afib stroke risk reduction and amlodipine for htn  Her syntrhroid and prozac were continued  Patient did receive insulin sq for dm2 therapy    Mri of brain was ordered to eval for acute cva as well as 2 d echo and lower extremity jon dopplers  And d dimer  Patient stay in the hospital was mostly uneventful  Her bp remained in acceptable range and she did not develop any fevers during hospital stay  Her 02 sats remained in the mid to high 90's on room air    MRI of brain did not reveal acute cva  jon dopplers did not reveal evid of acute dvt  D dimer <200 on 3/11/2022  Urine cult was added to initial ua but had not revealed grwth of any organisms by 3/13/2022  Patient's mental status did not decline during the hospital stay  Facial droop was not noted    I followed the patient for several days during her hospital stay  Patient mentation remained appropriate without facial droop or speech deficits  On 3/13  vss as follows  0845 96.9 (36.1) 17 71 150/78 -- -- -- -- -- --      She was deemed medically stable for discharge back to [facility]  Patient   agreeable to this plan  2 d echo was cancelled for acute IP as mri did not reveal acute cva    Given patient's documented hx of chf /HFpEF it would not be unreasonable to repeat echo at some point down the line however for this admission not necessary to complete echo inpatient    MRI of brain ruled out acute cva  The cause of patient's symptoms which prompted being sent to ED for evaluation was not determined       0845 96.9 (36.1) 17 71 150/78 -- -- -- -- -- --       vit b12/folate and tsh ordered as add ons as work up for mental status changes            Empiric uti rx was continued with macrobid    Order for discharge entered and I had no further contact with the patient following that      Consults:   IP CONSULT TO INTERNAL MEDICINE    Discharge Diagnoses:yy to patient with diagnosis of dementia presented to hospital with reports of  Acute mental status change- resolved  Facial droop reported- resolved    chronic kidney disease stage IIIb: Serum creatinine 1.6, appears to be at baseline  Trace bilateral lower extremity edema:   Essential hypertension   Hyperlipidemia:     Chronic atrial fibrillation:     Hypothyroidism:      Depression:   Hx of  dementia     Diabetes type 2 with peripheral neuropathy    Discharge Instructions / Follow up:  Results of urine cult  Results of vit b12 levels/folate/tsh ordered on 3/12/2022  Continued appropriate risk factor modification of blood pressure, diabetes and serum lipids will remain essential to reducing risk of future atherosclerotic development    Activity: activity as tolerated    Significant labs:  CBC:   Recent Labs     03/10/22  2250 03/12/22  0543 03/13/22  0532   WBC 7.1 5.8 6.1   RBC 4.65 3.97 3.96   HGB 14.9 12.8 12.5   HCT 45.1 38.6 39.0   MCV 97.0 97.2 98.5   RDW 12.6 12.8 12.9    229 219     BMP:   Recent Labs     03/10/22  2250 03/12/22  0543 03/13/22  0532    141 144   K 3.8 4.0 4.1    109* 114*   CO2 24 21* 19*   BUN 26* 19 18   CREATININE 1.6* 1.4* 1.4*     LFT:  Recent Labs     03/10/22  2250 03/12/22  0543 03/13/22  0532   PROT 7.5 6.0* 6.0*   ALKPHOS 132* 105* 98   ALT 16 12 12   AST 14 10 12   BILITOT 0.3 <0.2 <0.2     PT/INR:   Recent Labs     03/10/22  2250   INR 1.3   APTT 39.4*       Urinalysis:    Lab Results   Component Value Date    NITRU Negative 03/10/2022    WBCUA 2-5 03/10/2022    BACTERIA FEW 03/10/2022    RBCUA 2-5 03/10/2022    RBCUA 0-1 12/29/2013    BLOODU MODERATE 03/10/2022    SPECGRAV 1.010 03/10/2022    GLUCOSEU 100 03/10/2022       Imaging:  CT HEAD WO CONTRAST    Result Date: 3/10/2022  EXAMINATION: CT OF THE HEAD WITHOUT CONTRAST  3/10/2022 10:57 pm TECHNIQUE: CT of the head was performed without the administration of intravenous contrast. Dose modulation, iterative reconstruction, and/or weight based adjustment of the mA/kV was utilized to reduce the radiation dose to as low as reasonably achievable. COMPARISON: None. HISTORY: ORDERING SYSTEM PROVIDED HISTORY: CVA TECHNOLOGIST PROVIDED HISTORY: Has a \"code stroke\" or \"stroke alert\" been called? ->Yes Reason for exam:->CVA Decision Support Exception - unselect if not a suspected or confirmed emergency medical condition->Emergency Medical Condition (MA) FINDINGS: BRAIN/VENTRICLES: There is no acute intracranial hemorrhage, mass effect or midline shift. No abnormal extra-axial fluid collection. The gray-white differentiation is maintained without evidence of an acute infarct. There is no evidence of hydrocephalus. ORBITS: The visualized portion of the orbits demonstrate no acute abnormality. SINUSES: The visualized paranasal sinuses and mastoid air cells demonstrate no acute abnormality. SOFT TISSUES/SKULL:  No acute abnormality of the visualized skull or soft tissues. No acute intracranial abnormality. Findings discussed on 03/10/2022 with Dr. Charisma Barton at 11:46 p.m. Corky Rojas RECOMMENDATIONS: Unavailable     XR CHEST PORTABLE    Result Date: 3/11/2022  EXAMINATION: ONE XRAY VIEW OF THE CHEST 3/10/2022 11:58 pm COMPARISON: 2021 HISTORY: ORDERING SYSTEM PROVIDED HISTORY: weakness, Possible Stroke TECHNOLOGIST PROVIDED HISTORY: Reason for exam:->weakness, Possible Stroke What reading provider will be dictating this exam?->CRC FINDINGS: The lungs are without acute focal process. There is no effusion or pneumothorax. The cardiomediastinal silhouette is without acute process. The osseous structures are without acute process. No acute process. CTA NECK W CONTRAST    Result Date: 3/10/2022  Patient MRN:  [id number] : 1945 Age: 67 years Gender: Female Order Date:  ALERT:  THIS IS AN ABNORMAL REPORT EXAM: CTA NECK W CONTRAST, CTA HEAD W CONTRAST NUMBER OF IMAGES:  961 INDICATION: COMPARISON: None Technique: Low-dose CT  acquisition technique included one of following options; 1 . Automated exposure control, 2. Adjustment of MA and or KV according to patient's size or 3. Use of iterative reconstruction. Contiguous spiral images were obtained in the axial plane, following the administration of intravenous contrast using CT angiographic protocol. Sagittal and coronal images were reconstructed from the axial plane acquisition.  Additional MIP reconstructions were presented to aid in the interpretation of this study. Images were obtained from the skull base cranially. There is mild calcified plaque identified in the vessels compatible with atherosclerotic disease. There is aortic arch and great vessels demonstrate mild atherosclerotic disease. The right carotid is mildly atherosclerotic without significant stenosis The left carotid is mildly atherosclerotic without significant stenosis The right vertebral artery is mildly atherosclerotic without significant stenosis The left vertebral artery is mildly atherosclerotic without significant stenosis The basilar artery is unremarkable The middle cerebral arteries are unremarkable The anterior cerebral arteries are unremarkable The posterior cerebral arteries are unremarkable     1. Estimated stenosis of the proximal right and left internal carotid artery by NASCET criteria is not hemodynamically significant 2. Mild atherosclerotic disease . 3. No large vessel occlusion identified This study was analyzed by the [employer] ai algorithm. CT BRAIN PERFUSION    Result Date: 3/10/2022  Patient MRN: [id number] : 1945 Age:  67 years Gender: Female Order Date: 3/10/2022 Exam: CT BRAIN PERFUSION Number of Images: 553 views Indication:   left facial droop left facial droop Comparison: None. Findings: Perfusion images demonstrate symmetric blood volume Blood flow images demonstrate symmetric blood flow There is no significant ischemic penumbra identified. There is no significant core infarct identified. No significant ischemic penumbra identified This study was analyzed by the [employer] ai algorithm.      CTA HEAD W CONTRAST    Result Date: 3/10/2022  Patient MRN:  [id number] : 1945 Age: 67 years Gender: Female Order Date:  ALERT:  THIS IS AN ABNORMAL REPORT EXAM: CTA NECK W CONTRAST, CTA HEAD W CONTRAST NUMBER OF IMAGES:  961 INDICATION: COMPARISON: None Technique: Low-dose CT  acquisition technique included one of following options; 1 . Automated exposure control, 2. Adjustment of MA and or KV according to patient's size or 3. Use of iterative reconstruction. Contiguous spiral images were obtained in the axial plane, following the administration of intravenous contrast using CT angiographic protocol. Sagittal and coronal images were reconstructed from the axial plane acquisition. Additional MIP reconstructions were presented to aid in the interpretation of this study. Images were obtained from the skull base cranially. There is mild calcified plaque identified in the vessels compatible with atherosclerotic disease. There is aortic arch and great vessels demonstrate mild atherosclerotic disease. The right carotid is mildly atherosclerotic without significant stenosis The left carotid is mildly atherosclerotic without significant stenosis The right vertebral artery is mildly atherosclerotic without significant stenosis The left vertebral artery is mildly atherosclerotic without significant stenosis The basilar artery is unremarkable The middle cerebral arteries are unremarkable The anterior cerebral arteries are unremarkable The posterior cerebral arteries are unremarkable     1. Estimated stenosis of the proximal right and left internal carotid artery by NASCET criteria is not hemodynamically significant 2. Mild atherosclerotic disease . 3. No large vessel occlusion identified This study was analyzed by the [employer] algorithm.      MRI BRAIN WO CONTRAST    Result Date: 3/11/2022  EXAMINATION: MRI OF THE BRAIN WITHOUT CONTRAST  3/11/2022 12:23 pm TECHNIQUE: Multiplanar multisequence MRI of the brain was performed without the administration of intravenous contrast. COMPARISON: CTA head neck 03/10/2022, CT perfusion 03/10/2022 HISTORY: ORDERING SYSTEM PROVIDED HISTORY: ams TECHNOLOGIST PROVIDED HISTORY: Reason for exam:->ams What reading provider will be dictating this exam?->CRC FINDINGS: Ischemia: No restricted diffusion is seen to suggest ischemia. Chronic microvascular ischemic changes. Old left basal ganglia infarct. Parenchyma: No evidence of intracranial hemorrhage. No mass effect. Ventricles: No evidence of hydrocephalus. Flow voids: Flow voids are present in the proximal major intracranial arteries. No evidence of recent infarct, intracranial hemorrhage, mass effect, or hydrocephalus. Chronic microvascular ischemic changes. Old left basal ganglia infarct. US DUP LOWER EXTREMITIES BILATERAL VENOUS    Result Date: 3/11/2022  Patient MRN:  [id number] : 1945 Age: 67 years Gender: Female Order Date:  3/11/2022 9:42 AM EXAM: US DUP LOWER EXTREMITIES BILATERAL VENOUS NUMBER OF IMAGES:  39 INDICATION:  leg swelling leg swelling What reading provider will be dictating this exam?->[facility] COMPARISON: None Within the visualized vessels, there is no evidence for deep venous thrombosis There is good compressibility, there is good augmentation, there is good color flow.      Within the visualized vessels there is no evidence for deep venous thrombosis       Discharge Medications:      Medication List      START taking these medications    nitrofurantoin (macrocrystal-monohydrate) 100 MG capsule  Commonly known as: Macrobid  Take 1 capsule by mouth every 12 hours for 14 doses        CONTINUE taking these medications    acetaminophen 325 MG tablet  Commonly known as: TYLENOL     allopurinol 100 MG tablet  Commonly known as: ZYLOPRIM     amLODIPine 5 MG tablet  Commonly known as: NORVASC  Take 1 tablet by mouth daily     atorvastatin 10 MG tablet  Commonly known as: LIPITOR     blood glucose test strips  For use with glucometer     buPROPion 150 MG extended release tablet  Commonly known as: WELLBUTRIN XL     diclofenac sodium 1 % Gel  Commonly known as: VOLTAREN     divalproex 125 MG capsule  Commonly known as: DEPAKOTE SPRINKLE     DULoxetine 60 MG extended release capsule  Commonly known as: CYMBALTA     FLUoxetine 10 MG capsule  Commonly known as: PROZAC     gabapentin 300 MG capsule  Commonly known as: NEURONTIN     levothyroxine 100 MCG tablet  Commonly known as: SYNTHROID     Milk of Magnesia 400 MG/5ML suspension  Generic drug: magnesium hydroxide     rivastigmine 3 MG capsule  Commonly known as: EXELON     rOPINIRole 1 MG tablet  Commonly known as: REQUIP     topiramate 100 MG tablet  Commonly known as: TOPAMAX     torsemide 20 MG tablet  Commonly known as: DEMADEX     * Trintellix 5 MG tablet  Generic drug: VORTIoxetine     * Trintellix 20 MG Tabs tablet  Generic drug: VORTIoxetine HBr     vitamin D 50 MCG (2000 UT) Tabs tablet  Commonly known as: CHOLECALCIFEROL  Take 1 tablet by mouth daily     Xarelto 20 MG Tabs tablet  Generic drug: rivaroxaban         * This list has 2 medication(s) that are the same as other medications prescribed for you. Read the directions carefully, and ask your doctor or other care provider to review them with you. Where to Get Your Medications      These medications were sent to [facility], [address] [facility], [address] [place] [id number]    Phone: [phone]   · nitrofurantoin (macrocrystal-monohydrate) 100 MG capsule         Time Spent on discharge is more than 35 minutes in the examination, evaluation, counseling and review of medications and discharge plan.    +++++++++++++++++++++++++++++++++++++++++++++++++  Bebeto Meyers DO  [address]  +++++++++++++++++++++++++++++++++++++++++++++++++  NOTE: This report was transcribed using voice recognition software. Every effort was made to ensure accuracy; however, inadvertent computerized transcription errors may be present.

## 2022-03-15 LAB — URINE CULTURE, ROUTINE: NORMAL

## 2024-03-29 ENCOUNTER — HOSPITAL ENCOUNTER (EMERGENCY)
Age: 79
Discharge: HOME | End: 2024-03-30
Attending: EMERGENCY MEDICINE
Payer: MEDICARE

## 2024-03-29 ENCOUNTER — APPOINTMENT (OUTPATIENT)
Dept: CT IMAGING | Age: 79
End: 2024-03-29
Payer: MEDICARE

## 2024-03-29 ENCOUNTER — APPOINTMENT (OUTPATIENT)
Dept: GENERAL RADIOLOGY | Age: 79
End: 2024-03-29
Payer: MEDICARE

## 2024-03-29 ENCOUNTER — APPOINTMENT (OUTPATIENT)
Dept: ULTRASOUND IMAGING | Age: 79
End: 2024-03-29
Payer: MEDICARE

## 2024-03-29 DIAGNOSIS — N30.00 ACUTE CYSTITIS WITHOUT HEMATURIA: Primary | ICD-10-CM

## 2024-03-29 DIAGNOSIS — I89.0 LYMPHEDEMA: ICD-10-CM

## 2024-03-29 LAB
ALBUMIN SERPL-MCNC: 3.8 G/DL (ref 3.5–5.2)
ALP SERPL-CCNC: 107 U/L (ref 35–104)
ALT SERPL-CCNC: 15 U/L (ref 0–32)
ANION GAP SERPL CALCULATED.3IONS-SCNC: 10 MMOL/L (ref 7–16)
AST SERPL-CCNC: 13 U/L (ref 0–31)
BACTERIA URNS QL MICRO: ABNORMAL
BASOPHILS # BLD: 0.04 K/UL (ref 0–0.2)
BASOPHILS NFR BLD: 1 % (ref 0–2)
BILIRUB SERPL-MCNC: 0.2 MG/DL (ref 0–1.2)
BILIRUB UR QL STRIP: NEGATIVE
BNP SERPL-MCNC: 228 PG/ML (ref 0–450)
BUN SERPL-MCNC: 23 MG/DL (ref 6–23)
CALCIUM SERPL-MCNC: 9.4 MG/DL (ref 8.6–10.2)
CHLORIDE SERPL-SCNC: 107 MMOL/L (ref 98–107)
CLARITY UR: CLEAR
CO2 SERPL-SCNC: 22 MMOL/L (ref 22–29)
COLOR UR: YELLOW
CREAT SERPL-MCNC: 1.5 MG/DL (ref 0.5–1)
EOSINOPHIL # BLD: 0.29 K/UL (ref 0.05–0.5)
EOSINOPHILS RELATIVE PERCENT: 4 % (ref 0–6)
EPI CELLS #/AREA URNS HPF: ABNORMAL /HPF
ERYTHROCYTE [DISTWIDTH] IN BLOOD BY AUTOMATED COUNT: 13.7 % (ref 11.5–15)
GFR SERPL CREATININE-BSD FRML MDRD: 34 ML/MIN/1.73M2
GLUCOSE SERPL-MCNC: 90 MG/DL (ref 74–99)
GLUCOSE UR STRIP-MCNC: NEGATIVE MG/DL
HCT VFR BLD AUTO: 39.4 % (ref 34–48)
HGB BLD-MCNC: 12.7 G/DL (ref 11.5–15.5)
HGB UR QL STRIP.AUTO: NEGATIVE
IMM GRANULOCYTES # BLD AUTO: 0.03 K/UL (ref 0–0.58)
IMM GRANULOCYTES NFR BLD: 0 % (ref 0–5)
INFLUENZA A BY PCR: NOT DETECTED
INFLUENZA B BY PCR: NOT DETECTED
KETONES UR STRIP-MCNC: NEGATIVE MG/DL
LACTATE BLDV-SCNC: 0.7 MMOL/L (ref 0.5–2.2)
LEUKOCYTE ESTERASE UR QL STRIP: ABNORMAL
LYMPHOCYTES NFR BLD: 1.42 K/UL (ref 1.5–4)
LYMPHOCYTES RELATIVE PERCENT: 19 % (ref 20–42)
MCH RBC QN AUTO: 31.1 PG (ref 26–35)
MCHC RBC AUTO-ENTMCNC: 32.2 G/DL (ref 32–34.5)
MCV RBC AUTO: 96.3 FL (ref 80–99.9)
MONOCYTES NFR BLD: 0.68 K/UL (ref 0.1–0.95)
MONOCYTES NFR BLD: 9 % (ref 2–12)
NEUTROPHILS NFR BLD: 68 % (ref 43–80)
NEUTS SEG NFR BLD: 5.14 K/UL (ref 1.8–7.3)
NITRITE UR QL STRIP: POSITIVE
PH UR STRIP: 6.5 [PH] (ref 5–9)
PLATELET # BLD AUTO: 225 K/UL (ref 130–450)
PMV BLD AUTO: 9.3 FL (ref 7–12)
POTASSIUM SERPL-SCNC: 4.3 MMOL/L (ref 3.5–5)
PROT SERPL-MCNC: 6.5 G/DL (ref 6.4–8.3)
PROT UR STRIP-MCNC: NEGATIVE MG/DL
RBC # BLD AUTO: 4.09 M/UL (ref 3.5–5.5)
RBC #/AREA URNS HPF: ABNORMAL /HPF
SARS-COV-2 RDRP RESP QL NAA+PROBE: NOT DETECTED
SODIUM SERPL-SCNC: 139 MMOL/L (ref 132–146)
SP GR UR STRIP: 1.01 (ref 1–1.03)
SPECIMEN DESCRIPTION: NORMAL
TROPONIN I SERPL HS-MCNC: 11 NG/L (ref 0–9)
TROPONIN I SERPL HS-MCNC: 12 NG/L (ref 0–9)
UROBILINOGEN UR STRIP-ACNC: 0.2 EU/DL (ref 0–1)
WBC #/AREA URNS HPF: ABNORMAL /HPF
WBC OTHER # BLD: 7.6 K/UL (ref 4.5–11.5)

## 2024-03-29 PROCEDURE — 96374 THER/PROPH/DIAG INJ IV PUSH: CPT

## 2024-03-29 PROCEDURE — 87086 URINE CULTURE/COLONY COUNT: CPT

## 2024-03-29 PROCEDURE — 84484 ASSAY OF TROPONIN QUANT: CPT

## 2024-03-29 PROCEDURE — 81001 URINALYSIS AUTO W/SCOPE: CPT

## 2024-03-29 PROCEDURE — 87502 INFLUENZA DNA AMP PROBE: CPT

## 2024-03-29 PROCEDURE — 70450 CT HEAD/BRAIN W/O DYE: CPT

## 2024-03-29 PROCEDURE — 6360000002 HC RX W HCPCS

## 2024-03-29 PROCEDURE — 80053 COMPREHEN METABOLIC PANEL: CPT

## 2024-03-29 PROCEDURE — 85025 COMPLETE CBC W/AUTO DIFF WBC: CPT

## 2024-03-29 PROCEDURE — 87635 SARS-COV-2 COVID-19 AMP PRB: CPT

## 2024-03-29 PROCEDURE — 83605 ASSAY OF LACTIC ACID: CPT

## 2024-03-29 PROCEDURE — 93005 ELECTROCARDIOGRAM TRACING: CPT

## 2024-03-29 PROCEDURE — 99285 EMERGENCY DEPT VISIT HI MDM: CPT

## 2024-03-29 PROCEDURE — 87088 URINE BACTERIA CULTURE: CPT

## 2024-03-29 PROCEDURE — 83880 ASSAY OF NATRIURETIC PEPTIDE: CPT

## 2024-03-29 PROCEDURE — 93970 EXTREMITY STUDY: CPT

## 2024-03-29 PROCEDURE — 71045 X-RAY EXAM CHEST 1 VIEW: CPT

## 2024-03-29 PROCEDURE — 2580000003 HC RX 258

## 2024-03-29 RX ORDER — 0.9 % SODIUM CHLORIDE 0.9 %
1000 INTRAVENOUS SOLUTION INTRAVENOUS ONCE
Status: DISCONTINUED | OUTPATIENT
Start: 2024-03-29 | End: 2024-03-29

## 2024-03-29 RX ADMIN — WATER 1000 MG: 1 INJECTION INTRAMUSCULAR; INTRAVENOUS; SUBCUTANEOUS at 23:16

## 2024-03-29 ASSESSMENT — PAIN SCALES - GENERAL: PAINLEVEL_OUTOF10: 7

## 2024-03-29 ASSESSMENT — PAIN - FUNCTIONAL ASSESSMENT: PAIN_FUNCTIONAL_ASSESSMENT: 0-10

## 2024-03-29 ASSESSMENT — LIFESTYLE VARIABLES
HOW MANY STANDARD DRINKS CONTAINING ALCOHOL DO YOU HAVE ON A TYPICAL DAY: PATIENT DOES NOT DRINK
HOW OFTEN DO YOU HAVE A DRINK CONTAINING ALCOHOL: NEVER

## 2024-03-30 VITALS
WEIGHT: 215 LBS | OXYGEN SATURATION: 95 % | DIASTOLIC BLOOD PRESSURE: 83 MMHG | TEMPERATURE: 97.3 F | HEART RATE: 60 BPM | BODY MASS INDEX: 34.55 KG/M2 | RESPIRATION RATE: 16 BRPM | HEIGHT: 66 IN | SYSTOLIC BLOOD PRESSURE: 178 MMHG

## 2024-03-30 LAB
EKG ATRIAL RATE: 57 BPM
EKG P AXIS: 26 DEGREES
EKG P-R INTERVAL: 236 MS
EKG Q-T INTERVAL: 420 MS
EKG QRS DURATION: 90 MS
EKG QTC CALCULATION (BAZETT): 408 MS
EKG R AXIS: 3 DEGREES
EKG T AXIS: 60 DEGREES
EKG VENTRICULAR RATE: 57 BPM

## 2024-03-30 PROCEDURE — 93010 ELECTROCARDIOGRAM REPORT: CPT | Performed by: INTERNAL MEDICINE

## 2024-03-30 RX ORDER — CEFDINIR 300 MG/1
300 CAPSULE ORAL 2 TIMES DAILY
Qty: 14 CAPSULE | Refills: 0 | Status: SHIPPED | OUTPATIENT
Start: 2024-03-30 | End: 2024-04-06

## 2024-03-30 RX ORDER — CEFDINIR 300 MG/1
300 CAPSULE ORAL 2 TIMES DAILY
Qty: 14 CAPSULE | Refills: 0 | Status: SHIPPED | OUTPATIENT
Start: 2024-03-30 | End: 2024-03-30

## 2024-03-30 NOTE — DISCHARGE INSTRUCTIONS
Follow up with eye specialist by calling number above  Start taking your antibiotic for your urinary tract infection

## 2024-03-30 NOTE — ED PROVIDER NOTES
[facility] [place] EMERGENCY DEPARTMENT  EMERGENCY DEPARTMENT ENCOUNTER        Pt Name: Magaly Griffin  MRN: [id number]  Birthdate 1945  Date of evaluation: 3/29/2024  Provider: Adebayo Willams DO  PCP: Becky Hill APRN - CNP  Note Started: 8:40 PM EDT 3/29/24    CHIEF COMPLAINT       Chief Complaint   Patient presents with    Dizziness     Started over the last few days. Reporting intermittent blurred vision. EMS reporting general illness.        HISTORY OF PRESENT ILLNESS: 1 or more Elements   History From: PATIENT     Limitations to history : None    Magaly Griffin is a 78 y.o. female with prior history of CHF, CKD, CAD, hypothyroidism, atrial fibrillation currently on Xarelto arriving with a complaint of dizziness, blurry vision, difficulty walking and leg pain/swelling.  She also states she has been somewhat short of breath over the past week.  Patient comes from an assisted living center.  She uses a walker at baseline.      Nursing Notes were all reviewed and agreed with or any disagreements were addressed in the HPI.    REVIEW OF SYSTEMS :      Review of Systems    POSITIVE (+): dizziness, blurry vision, leg swelling, leg pain, shortness of breath   NEGATIVE (-): fevers, chills, nausea, vomiting, chest pain     SURGICAL HISTORY     Past Surgical History:   Procedure Laterality Date    CARDIAC SURGERY      CHOLECYSTECTOMY      DIAGNOSTIC CARDIAC CATH LAB PROCEDURE  09/10/2009    LV 70%. stenotic CAD. Previously placed BM stent: LAD-patent. Successful GUILLERMO of LAD: mid    DIAGNOSTIC CARDIAC CATH LAB PROCEDURE  10/11/2007    LVF well preserved, EF 70%. No MR. Patent coronary arteries. Patent stented segments in LAD.    DIAGNOSTIC CARDIAC CATH LAB PROCEDURE  09/27/2006    Patent stents in proximal and mid LAD with mild instent stenosis. Mild CAD of mid LAD 30% stenosis. Normal LVSF, EF 60%.     DIAGNOSTIC CARDIAC CATH LAB PROCEDURE  05/23/2003    EF and contraction pattern  Unspecified cerebral artery occlusion with cerebral infarction, and Unstable angina (HCC) (1/19/2014).     EMERGENCY DEPARTMENT COURSE and DIFFERENTIAL DIAGNOSIS/MDM:   Vitals:    Vitals:    03/29/24 2030   BP: (!) 178/83   Pulse: 60   Resp: 20   Temp: 97.3 °F (36.3 °C)   TempSrc: Oral   SpO2: 98%   Weight: 97.5 kg (215 lb)   Height: 1.676 m (5' 6\")                 MDM  ***    DDX: ***    ED course  ***  Medications - No data to display (medications given in the ED and medications ordered by admitting physician at time of submitting note)  ***    CONSULTS: (Who and What was discussed)  None  ***    {Social Determinants (Optional):28059}      Records Reviewed (source and summary): ***    Disposition Considerations: ***      I am the Primary Clinician of Record.    FINAL IMPRESSION    No diagnosis found.      DISPOSITION/PLAN     DISPOSITION        PATIENT REFERRED TO:  No follow-up provider specified.    DISCHARGE MEDICATIONS:  New Prescriptions    No medications on file       DISCONTINUED MEDICATIONS:  Discontinued Medications    No medications on file              (Please note that portions of this note were completed with a voice recognition program.  Efforts were made to edit the dictations but occasionally words are mis-transcribed.)    Adebayo Willams, DO PGY-2

## 2024-03-31 LAB
MICROORGANISM SPEC CULT: ABNORMAL
SPECIMEN DESCRIPTION: ABNORMAL

## 2024-04-01 LAB
MICROORGANISM SPEC CULT: ABNORMAL
SPECIMEN DESCRIPTION: ABNORMAL

## 2024-04-01 NOTE — ED NOTES
Reviewed patients after hours culture results.   Urine culture growing ESCHERICHIA COLI, patient discharged on cefdinir.   No need for further intervention at this time.    Venancio GallegosD, BCPS 4/1/2024 12:53 PM   [phone]

## 2024-05-23 ENCOUNTER — APPOINTMENT (OUTPATIENT)
Dept: GENERAL RADIOLOGY | Age: 79
End: 2024-05-23
Payer: MEDICARE

## 2024-05-23 ENCOUNTER — HOSPITAL ENCOUNTER (EMERGENCY)
Age: 79
Discharge: HOME | End: 2024-05-24
Attending: EMERGENCY MEDICINE
Payer: MEDICARE

## 2024-05-23 DIAGNOSIS — M79.672 LEFT FOOT PAIN: Primary | ICD-10-CM

## 2024-05-23 PROCEDURE — 73521 X-RAY EXAM HIPS BI 2 VIEWS: CPT

## 2024-05-23 PROCEDURE — 99283 EMERGENCY DEPT VISIT LOW MDM: CPT

## 2024-05-23 PROCEDURE — 73590 X-RAY EXAM OF LOWER LEG: CPT

## 2024-05-23 PROCEDURE — 6370000000 HC RX 637 (ALT 250 FOR IP)

## 2024-05-23 PROCEDURE — 73630 X-RAY EXAM OF FOOT: CPT

## 2024-05-23 RX ORDER — ACETAMINOPHEN 500 MG
1000 TABLET ORAL
Status: COMPLETED | OUTPATIENT
Start: 2024-05-23 | End: 2024-05-23

## 2024-05-23 RX ADMIN — ACETAMINOPHEN 1000 MG: 500 TABLET ORAL at 21:19

## 2024-05-23 ASSESSMENT — ENCOUNTER SYMPTOMS: ROS SKIN COMMENTS: BRUISING

## 2024-05-23 ASSESSMENT — PAIN DESCRIPTION - LOCATION
LOCATION: FOOT;HIP;LEG
LOCATION: HEAD;ANKLE;FOOT

## 2024-05-23 ASSESSMENT — PAIN DESCRIPTION - ORIENTATION
ORIENTATION: LEFT
ORIENTATION: LEFT

## 2024-05-23 ASSESSMENT — PAIN SCALES - GENERAL
PAINLEVEL_OUTOF10: 10
PAINLEVEL_OUTOF10: 10

## 2024-05-23 ASSESSMENT — LIFESTYLE VARIABLES
HOW OFTEN DO YOU HAVE A DRINK CONTAINING ALCOHOL: NEVER
HOW MANY STANDARD DRINKS CONTAINING ALCOHOL DO YOU HAVE ON A TYPICAL DAY: PATIENT DOES NOT DRINK

## 2024-05-23 ASSESSMENT — PAIN - FUNCTIONAL ASSESSMENT: PAIN_FUNCTIONAL_ASSESSMENT: 0-10

## 2024-05-23 ASSESSMENT — PAIN DESCRIPTION - DESCRIPTORS: DESCRIPTORS: ACHING

## 2024-05-24 VITALS
TEMPERATURE: 97.9 F | HEART RATE: 56 BPM | OXYGEN SATURATION: 97 % | DIASTOLIC BLOOD PRESSURE: 70 MMHG | SYSTOLIC BLOOD PRESSURE: 148 MMHG | RESPIRATION RATE: 20 BRPM | BODY MASS INDEX: 34.87 KG/M2 | HEIGHT: 66 IN | WEIGHT: 217 LBS
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
## 2024-07-04 ENCOUNTER — APPOINTMENT (OUTPATIENT)
Dept: ULTRASOUND IMAGING | Age: 79
End: 2024-07-04
Payer: MEDICARE

## 2024-07-04 ENCOUNTER — HOSPITAL ENCOUNTER (EMERGENCY)
Age: 79
Discharge: HOME | End: 2024-07-05
Payer: MEDICARE

## 2024-07-04 VITALS
OXYGEN SATURATION: 97 % | DIASTOLIC BLOOD PRESSURE: 89 MMHG | RESPIRATION RATE: 18 BRPM | TEMPERATURE: 98 F | SYSTOLIC BLOOD PRESSURE: 175 MMHG | HEART RATE: 67 BPM

## 2024-07-04 DIAGNOSIS — M79.605 LEFT LEG PAIN: Primary | ICD-10-CM

## 2024-07-04 PROCEDURE — 6370000000 HC RX 637 (ALT 250 FOR IP): Performed by: PHYSICIAN ASSISTANT

## 2024-07-04 PROCEDURE — 99284 EMERGENCY DEPT VISIT MOD MDM: CPT

## 2024-07-04 PROCEDURE — 93971 EXTREMITY STUDY: CPT

## 2024-07-04 RX ORDER — OXYCODONE HYDROCHLORIDE AND ACETAMINOPHEN 5; 325 MG/1; MG/1
1 TABLET ORAL EVERY 6 HOURS PRN
Qty: 12 TABLET | Refills: 0 | Status: SHIPPED | OUTPATIENT
Start: 2024-07-04 | End: 2024-07-07

## 2024-07-04 RX ORDER — OXYCODONE HYDROCHLORIDE AND ACETAMINOPHEN 5; 325 MG/1; MG/1
1 TABLET ORAL ONCE
Status: COMPLETED | OUTPATIENT
Start: 2024-07-04 | End: 2024-07-04

## 2024-07-04 RX ADMIN — OXYCODONE HYDROCHLORIDE AND ACETAMINOPHEN 1 TABLET: 5; 325 TABLET ORAL at 21:29

## 2024-07-04 RX ADMIN — OXYCODONE HYDROCHLORIDE AND ACETAMINOPHEN 1 TABLET: 5; 325 TABLET ORAL at 23:24

## 2024-07-04 ASSESSMENT — PAIN SCALES - GENERAL
PAINLEVEL_OUTOF10: 10
PAINLEVEL_OUTOF10: 10

## 2024-07-04 ASSESSMENT — PAIN DESCRIPTION - ORIENTATION: ORIENTATION: LEFT

## 2024-07-04 ASSESSMENT — PAIN DESCRIPTION - LOCATION: LOCATION: LEG

## 2024-07-05 NOTE — DISCHARGE INSTR - COC
Continuity of Care Form    Patient Name: Magaly Griffin   :  1945  MRN:  [id number]    Admit date:  2024  Discharge date:  ***    Code Status Order: Prior   Advance Directives:     Admitting Physician:  No admitting provider for patient encounter.  PCP: Becky Hill APRN - CNP    Discharging Nurse: ***  Discharging Hospital Unit/Room#: FRANCISCO/FRANCISCO  Discharging Unit Phone Number: ***    Emergency Contact:   Extended Emergency Contact Information  Primary Emergency Contact: Jm Angel  Home Phone: [phone]  Mobile Phone: [phone]  Relation: Legal Guardian   needed? No  Secondary Emergency Contact: Elsa Griffin   [facility]  Home Phone: [phone]  Relation: Child   needed? No    Past Surgical History:  Past Surgical History:   Procedure Laterality Date    CARDIAC SURGERY      CHOLECYSTECTOMY      DIAGNOSTIC CARDIAC CATH LAB PROCEDURE  09/10/2009    LV 70%. stenotic CAD. Previously placed BM stent: LAD-patent. Successful GUILLERMO of LAD: mid    DIAGNOSTIC CARDIAC CATH LAB PROCEDURE  10/11/2007    LVF well preserved, EF 70%. No MR. Patent coronary arteries. Patent stented segments in LAD.    DIAGNOSTIC CARDIAC CATH LAB PROCEDURE  2006    Patent stents in proximal and mid LAD with mild instent stenosis. Mild CAD of mid LAD 30% stenosis. Normal LVSF, EF 60%.     DIAGNOSTIC CARDIAC CATH LAB PROCEDURE  2003    EF and contraction pattern normal. No MR. Normal LVF. Atherosclerotic CAD.     ECHO COMPLETE  2014 EF62%    stage I diastolic    GALLBLADDER SURGERY      HERNIA REPAIR      HIATAL HERNIA REPAIR      HYSTERECTOMY (CERVIX STATUS UNKNOWN)      JOINT REPLACEMENT Bilateral     knee    THYROID SURGERY      THYROIDECTOMY         Immunization History:   Immunization History   Administered Date(s) Administered    Influenza Vaccine, unspecified formulation 2015    Influenza, FLUARIX, FLULAVAL, FLUZONE (age 6 mo+) AND AFLURIA, (age 3 y+), PF, 0.5mL  Discharge: ***    Physician Certification: I certify the above information and transfer of Magaly Griffin  is necessary for the continuing treatment of the diagnosis listed and that she requires {Admit to Appropriate Level of Care:64899} for {GREATER/LESS:468131153} 30 days.     Update Admission H&P: {CHP DME Changes in HandP:972202348}    PHYSICIAN SIGNATURE:  {Esignature:[id number]}

## 2024-07-05 NOTE — ED PROVIDER NOTES
Independent [facility] Visit.    HPI:  7/4/24,   Time: 9:01 PM EDT         Magaly Griffin is a 79 y.o. female with past medical history of obesity, hypertension, dyslipidemia, A-fib, chronic kidney disease and heart failure presenting to the ED by EMS from [facility] for leg pain.  Patient reports left leg pain.  It is gradually worsened throughout the daytime.  She moved from her wheelchair to her sitting chair and states after that she could not move her leg anymore.  She states pain from the ankle all the way up to her hip.  Sharp shooting pains.  It remains constant.  No injury or trauma.  Trying to move the leg makes pain worse and resting it makes it better.  She is not taking anything for the pain.  Denies history of blood clots.  Denies fever, chills, body aches, headache, dizziness, syncope, chest pain, shortness of breath, abdominal pain, nausea, vomiting, diarrhea or numbness and tingling.  ROS:   Pertinent positives and negatives are stated within HPI, all other systems reviewed and are negative.  --------------------------------------------- PAST HISTORY ---------------------------------------------  Past Medical History:  has a past medical history of Acute bronchitis, Acute on chronic diastolic (congestive) heart failure (HCC), Acute renal failure (HCC), Acute renal failure with acute cortical necrosis (HCC), Acute-on-chronic kidney injury (HCC), Arthritis, Atrial fibrillation (HCC), CAD (coronary artery disease), Cancer (HCC), CHF (congestive heart failure) (HCC), Dementia (HCC), Depression, Diabetes mellitus (HCC), Enteritis, H/O echocardiogram, History of blood transfusion, History of cardiovascular stress test, History of cardiovascular stress test, History of echocardiogram, History of echocardiogram, Hyperkalemia, Hyperlipidemia, Hypertension, 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

## 2024-08-07 ENCOUNTER — APPOINTMENT (OUTPATIENT)
Dept: GENERAL RADIOLOGY | Age: 79
End: 2024-08-07
Payer: MEDICARE

## 2024-08-07 ENCOUNTER — HOSPITAL ENCOUNTER (EMERGENCY)
Age: 79
Discharge: HOME | End: 2024-08-07
Attending: EMERGENCY MEDICINE
Payer: MEDICARE

## 2024-08-07 VITALS
DIASTOLIC BLOOD PRESSURE: 77 MMHG | TEMPERATURE: 99.4 F | SYSTOLIC BLOOD PRESSURE: 139 MMHG | RESPIRATION RATE: 16 BRPM | HEART RATE: 72 BPM | BODY MASS INDEX: 28.93 KG/M2 | OXYGEN SATURATION: 94 % | HEIGHT: 66 IN | WEIGHT: 180 LBS

## 2024-08-07 DIAGNOSIS — M79.605 LEFT LEG PAIN: Primary | ICD-10-CM

## 2024-08-07 PROCEDURE — 6370000000 HC RX 637 (ALT 250 FOR IP): Performed by: EMERGENCY MEDICINE

## 2024-08-07 PROCEDURE — 73552 X-RAY EXAM OF FEMUR 2/>: CPT

## 2024-08-07 PROCEDURE — 73590 X-RAY EXAM OF LOWER LEG: CPT

## 2024-08-07 PROCEDURE — 99283 EMERGENCY DEPT VISIT LOW MDM: CPT

## 2024-08-07 RX ORDER — HYDROCODONE BITARTRATE AND ACETAMINOPHEN 5; 325 MG/1; MG/1
1 TABLET ORAL ONCE
Status: COMPLETED | OUTPATIENT
Start: 2024-08-07 | End: 2024-08-07

## 2024-08-07 RX ADMIN — HYDROCODONE BITARTRATE AND ACETAMINOPHEN 1 TABLET: 5; 325 TABLET ORAL at 03:45

## 2024-08-07 ASSESSMENT — ENCOUNTER SYMPTOMS
SHORTNESS OF BREATH: 0
ABDOMINAL DISTENTION: 0
EYE DISCHARGE: 0
NAUSEA: 0
VOMITING: 0
SINUS PRESSURE: 0
WHEEZING: 0
COUGH: 0
BACK PAIN: 0
EYE PAIN: 0
EYE REDNESS: 0
DIARRHEA: 0
SORE THROAT: 0

## 2024-08-07 ASSESSMENT — PAIN DESCRIPTION - ORIENTATION: ORIENTATION: LEFT

## 2024-08-07 ASSESSMENT — PAIN SCALES - GENERAL: PAINLEVEL_OUTOF10: 10

## 2024-08-07 ASSESSMENT — PAIN DESCRIPTION - LOCATION: LOCATION: HIP;LEG

## 2024-08-07 NOTE — ED PROVIDER NOTES
Patient is an 78 y/o female who presents to the ED via EMS from the nursing home with left leg pain. Patient states she has pain of her entire left leg. She states the pain began a few days ago. She denies any fall or injury. She reports 10/10 pain. She states that she takes Lasix for her pain.         Review of Systems   Constitutional:  Negative for chills and fever.   HENT:  Negative for ear pain, sinus pressure and sore throat.    Eyes:  Negative for pain, discharge and redness.   Respiratory:  Negative for cough, shortness of breath and wheezing.    Cardiovascular:  Negative for chest pain.   Gastrointestinal:  Negative for abdominal distention, diarrhea, nausea and vomiting.   Genitourinary:  Negative for dysuria and frequency.   Musculoskeletal:  Positive for arthralgias. Negative for back pain.   Skin:  Negative for rash and wound.   Neurological:  Negative for weakness and headaches.   Hematological:  Negative for adenopathy.   All other systems reviewed and are negative.       Physical Exam  Vitals and nursing note reviewed.   Constitutional:       General: She is not in acute distress.     Appearance: She is obese.   HENT:      Head: Normocephalic and atraumatic.      Right Ear: External ear normal.      Left Ear: External ear normal.      Nose: Nose normal.      Mouth/Throat:      Mouth: Mucous membranes are moist.   Eyes:      Conjunctiva/sclera: Conjunctivae normal.      Pupils: Pupils are equal, round, and reactive to light.   Cardiovascular:      Rate and Rhythm: Normal rate and regular rhythm.      Heart sounds: No murmur heard.  Pulmonary:      Effort: Pulmonary effort is normal. No respiratory distress.      Breath sounds: Normal breath sounds. No stridor. No wheezing, rhonchi or rales.   Abdominal:      General: Bowel sounds are normal. There is no distension.      Palpations: Abdomen is soft.      Tenderness: There is no abdominal tenderness. There is no guarding.   Musculoskeletal:

## 2024-09-18 NOTE — CARE COORDINATION
CM note: Placed a call to Dexter Francis at [place] [address] to advise of patient leaving AMA, she stated that they were aware. Yes

## 2024-11-15 ENCOUNTER — HOSPITAL ENCOUNTER (EMERGENCY)
Age: 79
Discharge: HOME | End: 2024-11-16
Attending: STUDENT IN AN ORGANIZED HEALTH CARE EDUCATION/TRAINING PROGRAM
Payer: MEDICARE

## 2024-11-15 ENCOUNTER — APPOINTMENT (OUTPATIENT)
Dept: GENERAL RADIOLOGY | Age: 79
End: 2024-11-15
Payer: MEDICARE

## 2024-11-15 DIAGNOSIS — R94.31 NONSPECIFIC ST-T WAVE ELECTROCARDIOGRAPHIC CHANGES: ICD-10-CM

## 2024-11-15 DIAGNOSIS — R52 BODY ACHES: Primary | ICD-10-CM

## 2024-11-15 DIAGNOSIS — N39.0 URINARY TRACT INFECTION WITHOUT HEMATURIA, SITE UNSPECIFIED: ICD-10-CM

## 2024-11-15 LAB
ALBUMIN SERPL-MCNC: 3.9 G/DL (ref 3.5–5.2)
ALP SERPL-CCNC: 86 U/L (ref 35–104)
ALT SERPL-CCNC: 20 U/L (ref 0–32)
ANION GAP SERPL CALCULATED.3IONS-SCNC: 11 MMOL/L (ref 7–16)
AST SERPL-CCNC: 21 U/L (ref 0–31)
BACTERIA URNS QL MICRO: ABNORMAL
BASOPHILS # BLD: 0.04 K/UL (ref 0–0.2)
BASOPHILS NFR BLD: 1 % (ref 0–2)
BILIRUB SERPL-MCNC: 0.2 MG/DL (ref 0–1.2)
BILIRUB UR QL STRIP: NEGATIVE
BNP SERPL-MCNC: 136 PG/ML (ref 0–450)
BUN SERPL-MCNC: 29 MG/DL (ref 6–23)
CALCIUM SERPL-MCNC: 9.8 MG/DL (ref 8.6–10.2)
CHLORIDE SERPL-SCNC: 107 MMOL/L (ref 98–107)
CK SERPL-CCNC: 38 U/L (ref 20–180)
CLARITY UR: ABNORMAL
CO2 SERPL-SCNC: 24 MMOL/L (ref 22–29)
COLOR UR: YELLOW
CREAT SERPL-MCNC: 1.6 MG/DL (ref 0.5–1)
DATE LAST DOSE: ABNORMAL
EOSINOPHIL # BLD: 0.25 K/UL (ref 0.05–0.5)
EOSINOPHILS RELATIVE PERCENT: 4 % (ref 0–6)
ERYTHROCYTE [DISTWIDTH] IN BLOOD BY AUTOMATED COUNT: 15.7 % (ref 11.5–15)
FLUAV RNA RESP QL NAA+PROBE: NOT DETECTED
FLUBV RNA RESP QL NAA+PROBE: NOT DETECTED
GFR, ESTIMATED: 33 ML/MIN/1.73M2
GLUCOSE SERPL-MCNC: 106 MG/DL (ref 74–99)
GLUCOSE UR STRIP-MCNC: NEGATIVE MG/DL
HCT VFR BLD AUTO: 39.8 % (ref 34–48)
HGB BLD-MCNC: 13.1 G/DL (ref 11.5–15.5)
HGB UR QL STRIP.AUTO: NEGATIVE
IMM GRANULOCYTES # BLD AUTO: <0.03 K/UL (ref 0–0.58)
IMM GRANULOCYTES NFR BLD: 0 % (ref 0–5)
KETONES UR STRIP-MCNC: ABNORMAL MG/DL
LACTATE BLDV-SCNC: 1.1 MMOL/L (ref 0.5–2.2)
LEUKOCYTE ESTERASE UR QL STRIP: NEGATIVE
LIPASE SERPL-CCNC: 25 U/L (ref 13–60)
LYMPHOCYTES NFR BLD: 1.18 K/UL (ref 1.5–4)
LYMPHOCYTES RELATIVE PERCENT: 19 % (ref 20–42)
MCH RBC QN AUTO: 30.7 PG (ref 26–35)
MCHC RBC AUTO-ENTMCNC: 32.9 G/DL (ref 32–34.5)
MCV RBC AUTO: 93.2 FL (ref 80–99.9)
MONOCYTES NFR BLD: 0.53 K/UL (ref 0.1–0.95)
MONOCYTES NFR BLD: 9 % (ref 2–12)
NEUTROPHILS NFR BLD: 67 % (ref 43–80)
NEUTS SEG NFR BLD: 4.06 K/UL (ref 1.8–7.3)
NITRITE UR QL STRIP: POSITIVE
PH UR STRIP: 5.5 [PH] (ref 5–9)
PLATELET # BLD AUTO: 241 K/UL (ref 130–450)
PMV BLD AUTO: 9 FL (ref 7–12)
POTASSIUM SERPL-SCNC: 4 MMOL/L (ref 3.5–5)
PROT SERPL-MCNC: 6.6 G/DL (ref 6.4–8.3)
PROT UR STRIP-MCNC: NEGATIVE MG/DL
RBC # BLD AUTO: 4.27 M/UL (ref 3.5–5.5)
RBC #/AREA URNS HPF: ABNORMAL /HPF
SARS-COV-2 RNA RESP QL NAA+PROBE: NOT DETECTED
SODIUM SERPL-SCNC: 142 MMOL/L (ref 132–146)
SOURCE: NORMAL
SP GR UR STRIP: 1.02 (ref 1–1.03)
SPECIMEN DESCRIPTION: NORMAL
TME LAST DOSE: ABNORMAL H
TROPONIN I SERPL HS-MCNC: 18 NG/L (ref 0–9)
TROPONIN I SERPL HS-MCNC: 21 NG/L (ref 0–9)
UROBILINOGEN UR STRIP-ACNC: 0.2 EU/DL (ref 0–1)
VALPROATE SERPL-MCNC: <3 UG/ML (ref 50–100)
VANCOMYCIN DOSE: ABNORMAL MG
WBC #/AREA URNS HPF: ABNORMAL /HPF
WBC OTHER # BLD: 6.1 K/UL (ref 4.5–11.5)

## 2024-11-15 PROCEDURE — 85025 COMPLETE CBC W/AUTO DIFF WBC: CPT

## 2024-11-15 PROCEDURE — 80053 COMPREHEN METABOLIC PANEL: CPT

## 2024-11-15 PROCEDURE — 83690 ASSAY OF LIPASE: CPT

## 2024-11-15 PROCEDURE — 83605 ASSAY OF LACTIC ACID: CPT

## 2024-11-15 PROCEDURE — 83880 ASSAY OF NATRIURETIC PEPTIDE: CPT

## 2024-11-15 PROCEDURE — 96374 THER/PROPH/DIAG INJ IV PUSH: CPT

## 2024-11-15 PROCEDURE — 93005 ELECTROCARDIOGRAM TRACING: CPT | Performed by: STUDENT IN AN ORGANIZED HEALTH CARE EDUCATION/TRAINING PROGRAM

## 2024-11-15 PROCEDURE — 87636 SARSCOV2 & INF A&B AMP PRB: CPT

## 2024-11-15 PROCEDURE — 82550 ASSAY OF CK (CPK): CPT

## 2024-11-15 PROCEDURE — 71045 X-RAY EXAM CHEST 1 VIEW: CPT

## 2024-11-15 PROCEDURE — 84484 ASSAY OF TROPONIN QUANT: CPT

## 2024-11-15 PROCEDURE — 81001 URINALYSIS AUTO W/SCOPE: CPT

## 2024-11-15 PROCEDURE — 6360000002 HC RX W HCPCS: Performed by: STUDENT IN AN ORGANIZED HEALTH CARE EDUCATION/TRAINING PROGRAM

## 2024-11-15 PROCEDURE — 99285 EMERGENCY DEPT VISIT HI MDM: CPT

## 2024-11-15 PROCEDURE — 80164 ASSAY DIPROPYLACETIC ACD TOT: CPT

## 2024-11-15 RX ORDER — FENTANYL CITRATE 0.05 MG/ML
25 INJECTION, SOLUTION INTRAMUSCULAR; INTRAVENOUS ONCE
Status: COMPLETED | OUTPATIENT
Start: 2024-11-15 | End: 2024-11-15

## 2024-11-15 RX ADMIN — FENTANYL CITRATE 25 MCG: 0.05 INJECTION, SOLUTION INTRAMUSCULAR; INTRAVENOUS at 23:14

## 2024-11-15 ASSESSMENT — PAIN DESCRIPTION - LOCATION: LOCATION: LEG;FOOT

## 2024-11-15 ASSESSMENT — PAIN SCALES - GENERAL: PAINLEVEL_OUTOF10: 10

## 2024-11-15 ASSESSMENT — PAIN DESCRIPTION - ORIENTATION: ORIENTATION: RIGHT;LEFT

## 2024-11-16 VITALS
OXYGEN SATURATION: 99 % | HEART RATE: 56 BPM | TEMPERATURE: 97.7 F | DIASTOLIC BLOOD PRESSURE: 69 MMHG | SYSTOLIC BLOOD PRESSURE: 134 MMHG | RESPIRATION RATE: 20 BRPM

## 2024-11-16 PROCEDURE — 96375 TX/PRO/DX INJ NEW DRUG ADDON: CPT

## 2024-11-16 PROCEDURE — 6360000002 HC RX W HCPCS: Performed by: STUDENT IN AN ORGANIZED HEALTH CARE EDUCATION/TRAINING PROGRAM

## 2024-11-16 PROCEDURE — 6370000000 HC RX 637 (ALT 250 FOR IP): Performed by: STUDENT IN AN ORGANIZED HEALTH CARE EDUCATION/TRAINING PROGRAM

## 2024-11-16 PROCEDURE — 87088 URINE BACTERIA CULTURE: CPT

## 2024-11-16 PROCEDURE — 87086 URINE CULTURE/COLONY COUNT: CPT

## 2024-11-16 PROCEDURE — 2580000003 HC RX 258: Performed by: STUDENT IN AN ORGANIZED HEALTH CARE EDUCATION/TRAINING PROGRAM

## 2024-11-16 RX ORDER — CEFDINIR 300 MG/1
300 CAPSULE ORAL 2 TIMES DAILY
Qty: 20 CAPSULE | Refills: 0 | Status: SHIPPED | OUTPATIENT
Start: 2024-11-16 | End: 2024-11-26

## 2024-11-16 RX ORDER — HYDROCODONE BITARTRATE AND ACETAMINOPHEN 5; 325 MG/1; MG/1
1 TABLET ORAL ONCE
Status: COMPLETED | OUTPATIENT
Start: 2024-11-16 | End: 2024-11-16

## 2024-11-16 RX ADMIN — WATER 1000 MG: 1 INJECTION INTRAMUSCULAR; INTRAVENOUS; SUBCUTANEOUS at 02:49

## 2024-11-16 RX ADMIN — HYDROCODONE BITARTRATE AND ACETAMINOPHEN 1 TABLET: 5; 325 TABLET ORAL at 02:49

## 2024-11-16 ASSESSMENT — PAIN DESCRIPTION - LOCATION: LOCATION: GENERALIZED

## 2024-11-16 ASSESSMENT — PAIN SCALES - GENERAL: PAINLEVEL_OUTOF10: 8

## 2024-11-16 NOTE — DISCHARGE INSTRUCTIONS
Please follow-up with your primary care doctor.  Return to ED for any new or worsening symptoms, fevers, persistent nausea vomiting or feeling any worse.

## 2024-11-16 NOTE — ED PROVIDER NOTES
Name: Magaly Griffin    MRN: [id number]     Date / Time Roomed:  11/15/2024  8:10 PM  ED Bed Assignment:  HALL03/H3    ------------------ History of Present Illness --------------------  11/15/24, Time: 8:15 PM EST   No chief complaint on file.     HPI    Magaly Griffin is a 79 y.o. female, with hx of CHF, diabetes, depression, dementia, hypothyroid, restless leg, prior CVA, lymphedema, hypertension, hyperlipidemia, A-fib, on Depakote, fluoxetine, Xarelto medications to name a few, DNRCCA, who presents to the ED today for generalized bodyaches, fatigue over the past several weeks she states.  Patient was brought in by EMS from care facility she relates having pains in her arms and legs.  She relates having a bit of cough and congestion as well as some mild chest pain and shortness of breath as well as abdominal aches and achy extremities.  She states symptoms been constant, worsening, no exacerbating relieving factors.  She denies any fevers.  Denies any nausea vomiting or diarrhea.  She has had increased urinary frequency however denies any pain with urination.  She does relate having swelling in her hands and legs which she states makes her achiness worse.  When asked if she has had any medication changes recently, she states she does not know and she just takes pills that are given to her at the facility.  Patient is poor historian on her medications.  The pt denies other ROS at this time.     PCP: Becky Hill APRN - CNP.    -------------------- PMH --------------------    Past Medical History:   has a past medical history of Acute bronchitis (4/27/2015), Acute on chronic diastolic (congestive) heart failure (HCC) (7/18/2017), Acute renal failure (HCC), Acute renal failure with acute cortical necrosis (HCC) (12/29/2013), Acute-on-chronic kidney injury (HCC) (1/19/2014), Arthritis, Atrial fibrillation (HCC), CAD (coronary artery disease), Cancer (HCC), CHF (congestive heart failure) (HCC), Dementia  ordered and may be a while before her transportation gets here to take her back to care facility [PW]      ED Course User Index  [FG] Ricky Veliz DO  [PW] Jay Rashid DO          Medications given include:  Medications   fentaNYL (SUBLIMAZE) injection 25 mcg (25 mcg IntraVENous Given 11/15/24 2314)   cefTRIAXone (ROCEPHIN) 1,000 mg in sterile water 10 mL IV syringe (1,000 mg IntraVENous Given 11/16/24 0249)   HYDROcodone-acetaminophen (NORCO) 5-325 MG per tablet 1 tablet (1 tablet Oral Given 11/16/24 0249)       See ED course above for MDM unless otherwise stated below.     IV fentanyl was given    Patient was found to have UA with nitrite positive urinary tract infection she did relate having increased urinary frequency.  Dose of IV Rocephin was given.  Urine cultures pending.  Lactate was within normal limits, normal white count.  Patient afebrile, nonseptic.  No signs of a systemic infection.  CK within normal limits, no evidence of rhabdo.  Creatinine 1.6 near baseline.  LFTs were within normal limits.  Electrolytes were balanced.  COVID and influenza test were negative.  EKG did show some T wave abnormalities which were changed from last prior EKG, although has had t-wave abnormalities on more distant ekgs, however her troponins today were 21 and 18, and not consistent with ACS.  BNP was within normal limits, chest x-ray negative for acute process, no signs of heart failure at this time.  Patient was given p.o. [place].      Patient body aches possibly secondary to her urinary tract infection, she was feeling a bit improved after treatment today.  She was felt stable for discharge back to her care facility with prescription for Omnicef for her urinary tract infection.  Return precautions were given.  Patient was discharged back to care facility.       -------------------- Consults --------------------  (Unless stated prior)  None    -------------------- RESULTS --------------------    Labs:  Results

## 2024-11-17 LAB
EKG ATRIAL RATE: 57 BPM
EKG P AXIS: 46 DEGREES
EKG P-R INTERVAL: 228 MS
EKG Q-T INTERVAL: 446 MS
EKG QRS DURATION: 90 MS
EKG QTC CALCULATION (BAZETT): 434 MS
EKG R AXIS: 3 DEGREES
EKG T AXIS: 81 DEGREES
EKG VENTRICULAR RATE: 57 BPM
MICROORGANISM SPEC CULT: ABNORMAL
SERVICE CMNT-IMP: ABNORMAL
SPECIMEN DESCRIPTION: ABNORMAL

## 2024-11-17 PROCEDURE — 93010 ELECTROCARDIOGRAM REPORT: CPT | Performed by: INTERNAL MEDICINE

## 2024-11-18 LAB
MICROORGANISM SPEC CULT: ABNORMAL
SERVICE CMNT-IMP: ABNORMAL
SPECIMEN DESCRIPTION: ABNORMAL

## 2025-02-15 ENCOUNTER — APPOINTMENT (OUTPATIENT)
Dept: ULTRASOUND IMAGING | Age: 80
End: 2025-02-15
Payer: MEDICARE

## 2025-02-15 ENCOUNTER — HOSPITAL ENCOUNTER (EMERGENCY)
Age: 80
Discharge: HOME | End: 2025-02-16
Attending: STUDENT IN AN ORGANIZED HEALTH CARE EDUCATION/TRAINING PROGRAM
Payer: MEDICARE

## 2025-02-15 ENCOUNTER — APPOINTMENT (OUTPATIENT)
Dept: GENERAL RADIOLOGY | Age: 80
End: 2025-02-15
Payer: MEDICARE

## 2025-02-15 ENCOUNTER — APPOINTMENT (OUTPATIENT)
Dept: CT IMAGING | Age: 80
End: 2025-02-15
Payer: MEDICARE

## 2025-02-15 DIAGNOSIS — I89.0 LYMPHEDEMA: Primary | ICD-10-CM

## 2025-02-15 DIAGNOSIS — N30.00 ACUTE CYSTITIS WITHOUT HEMATURIA: ICD-10-CM

## 2025-02-15 LAB
ALBUMIN SERPL-MCNC: 4.1 G/DL (ref 3.5–5.2)
ALP SERPL-CCNC: 85 U/L (ref 35–104)
ALT SERPL-CCNC: 21 U/L (ref 0–32)
ANION GAP SERPL CALCULATED.3IONS-SCNC: 10 MMOL/L (ref 7–16)
AST SERPL-CCNC: 22 U/L (ref 0–31)
BASOPHILS # BLD: 0.06 K/UL (ref 0–0.2)
BASOPHILS NFR BLD: 1 % (ref 0–2)
BILIRUB SERPL-MCNC: 0.2 MG/DL (ref 0–1.2)
BNP SERPL-MCNC: 101 PG/ML (ref 0–450)
BUN SERPL-MCNC: 26 MG/DL (ref 6–23)
CALCIUM SERPL-MCNC: 10.3 MG/DL (ref 8.6–10.2)
CHLORIDE SERPL-SCNC: 103 MMOL/L (ref 98–107)
CHP ED QC CHECK: NORMAL
CO2 SERPL-SCNC: 28 MMOL/L (ref 22–29)
CREAT SERPL-MCNC: 1.7 MG/DL (ref 0.5–1)
EOSINOPHIL # BLD: 0.21 K/UL (ref 0.05–0.5)
EOSINOPHILS RELATIVE PERCENT: 4 % (ref 0–6)
ERYTHROCYTE [DISTWIDTH] IN BLOOD BY AUTOMATED COUNT: 14.5 % (ref 11.5–15)
GFR, ESTIMATED: 30 ML/MIN/1.73M2
GLUCOSE BLD-MCNC: 100 MG/DL
GLUCOSE BLD-MCNC: 100 MG/DL (ref 74–99)
GLUCOSE SERPL-MCNC: 104 MG/DL (ref 74–99)
HCT VFR BLD AUTO: 41.3 % (ref 34–48)
HGB BLD-MCNC: 13.5 G/DL (ref 11.5–15.5)
IMM GRANULOCYTES # BLD AUTO: 0.03 K/UL (ref 0–0.58)
IMM GRANULOCYTES NFR BLD: 1 % (ref 0–5)
LIPASE SERPL-CCNC: 27 U/L (ref 13–60)
LYMPHOCYTES NFR BLD: 1.5 K/UL (ref 1.5–4)
LYMPHOCYTES RELATIVE PERCENT: 26 % (ref 20–42)
MAGNESIUM SERPL-MCNC: 2.4 MG/DL (ref 1.6–2.6)
MCH RBC QN AUTO: 32.2 PG (ref 26–35)
MCHC RBC AUTO-ENTMCNC: 32.7 G/DL (ref 32–34.5)
MCV RBC AUTO: 98.6 FL (ref 80–99.9)
MONOCYTES NFR BLD: 0.47 K/UL (ref 0.1–0.95)
MONOCYTES NFR BLD: 8 % (ref 2–12)
NEUTROPHILS NFR BLD: 60 % (ref 43–80)
NEUTS SEG NFR BLD: 3.42 K/UL (ref 1.8–7.3)
PLATELET # BLD AUTO: 211 K/UL (ref 130–450)
PMV BLD AUTO: 9 FL (ref 7–12)
POTASSIUM SERPL-SCNC: 3.9 MMOL/L (ref 3.5–5)
PROT SERPL-MCNC: 6.9 G/DL (ref 6.4–8.3)
RBC # BLD AUTO: 4.19 M/UL (ref 3.5–5.5)
SODIUM SERPL-SCNC: 141 MMOL/L (ref 132–146)
TROPONIN I SERPL HS-MCNC: 18 NG/L (ref 0–9)
WBC OTHER # BLD: 5.7 K/UL (ref 4.5–11.5)

## 2025-02-15 PROCEDURE — 80053 COMPREHEN METABOLIC PANEL: CPT

## 2025-02-15 PROCEDURE — 82962 GLUCOSE BLOOD TEST: CPT

## 2025-02-15 PROCEDURE — 51701 INSERT BLADDER CATHETER: CPT

## 2025-02-15 PROCEDURE — 71046 X-RAY EXAM CHEST 2 VIEWS: CPT

## 2025-02-15 PROCEDURE — 93970 EXTREMITY STUDY: CPT

## 2025-02-15 PROCEDURE — 83880 ASSAY OF NATRIURETIC PEPTIDE: CPT

## 2025-02-15 PROCEDURE — 83690 ASSAY OF LIPASE: CPT

## 2025-02-15 PROCEDURE — 99285 EMERGENCY DEPT VISIT HI MDM: CPT

## 2025-02-15 PROCEDURE — 83735 ASSAY OF MAGNESIUM: CPT

## 2025-02-15 PROCEDURE — 84484 ASSAY OF TROPONIN QUANT: CPT

## 2025-02-15 PROCEDURE — 93005 ELECTROCARDIOGRAM TRACING: CPT

## 2025-02-15 PROCEDURE — 85025 COMPLETE CBC W/AUTO DIFF WBC: CPT

## 2025-02-15 RX ORDER — IOPAMIDOL 755 MG/ML
75 INJECTION, SOLUTION INTRAVASCULAR
Status: COMPLETED | OUTPATIENT
Start: 2025-02-15 | End: 2025-02-16

## 2025-02-16 ENCOUNTER — APPOINTMENT (OUTPATIENT)
Dept: CT IMAGING | Age: 80
End: 2025-02-16
Payer: MEDICARE

## 2025-02-16 VITALS
BODY MASS INDEX: 37.12 KG/M2 | HEART RATE: 57 BPM | TEMPERATURE: 97.9 F | WEIGHT: 230 LBS | SYSTOLIC BLOOD PRESSURE: 157 MMHG | DIASTOLIC BLOOD PRESSURE: 80 MMHG | OXYGEN SATURATION: 98 % | RESPIRATION RATE: 18 BRPM

## 2025-02-16 LAB
BACTERIA URNS QL MICRO: ABNORMAL
BILIRUB UR QL STRIP: NEGATIVE
CLARITY UR: ABNORMAL
COLOR UR: YELLOW
EPI CELLS #/AREA URNS HPF: ABNORMAL /HPF
GLUCOSE UR STRIP-MCNC: NEGATIVE MG/DL
HGB UR QL STRIP.AUTO: NEGATIVE
KETONES UR STRIP-MCNC: NEGATIVE MG/DL
LEUKOCYTE ESTERASE UR QL STRIP: ABNORMAL
MUCOUS THREADS URNS QL MICRO: PRESENT
NITRITE UR QL STRIP: POSITIVE
PH UR STRIP: 8.5 [PH] (ref 5–8)
PROT UR STRIP-MCNC: ABNORMAL MG/DL
RBC #/AREA URNS HPF: ABNORMAL /HPF
SP GR UR STRIP: 1.01 (ref 1–1.03)
TROPONIN I SERPL HS-MCNC: 19 NG/L (ref 0–9)
UROBILINOGEN UR STRIP-ACNC: 0.2 EU/DL (ref 0–1)
WBC #/AREA URNS HPF: ABNORMAL /HPF

## 2025-02-16 PROCEDURE — 84484 ASSAY OF TROPONIN QUANT: CPT

## 2025-02-16 PROCEDURE — 6360000004 HC RX CONTRAST MEDICATION: Performed by: RADIOLOGY

## 2025-02-16 PROCEDURE — 96374 THER/PROPH/DIAG INJ IV PUSH: CPT

## 2025-02-16 PROCEDURE — 81001 URINALYSIS AUTO W/SCOPE: CPT

## 2025-02-16 PROCEDURE — 6360000002 HC RX W HCPCS

## 2025-02-16 PROCEDURE — 2500000003 HC RX 250 WO HCPCS

## 2025-02-16 PROCEDURE — 70450 CT HEAD/BRAIN W/O DYE: CPT

## 2025-02-16 PROCEDURE — 74177 CT ABD & PELVIS W/CONTRAST: CPT

## 2025-02-16 RX ORDER — CEFDINIR 300 MG/1
300 CAPSULE ORAL 2 TIMES DAILY
Qty: 14 CAPSULE | Refills: 0 | Status: SHIPPED | OUTPATIENT
Start: 2025-02-16 | End: 2025-02-23

## 2025-02-16 RX ADMIN — IOPAMIDOL 75 ML: 755 INJECTION, SOLUTION INTRAVENOUS at 00:02

## 2025-02-16 RX ADMIN — WATER 1000 MG: 1 INJECTION INTRAMUSCULAR; INTRAVENOUS; SUBCUTANEOUS at 05:16

## 2025-02-16 NOTE — ED PROVIDER NOTES
0.03  No leukocytosis or anemia [CP]   0400 Magnesium:    Magnesium 2.4  WNL [CP]   0400 Brain Natriuretic Peptide:    NT Pro-  WNL [CP]   0400 Troponin(!):    Troponin, High Sensitivity 19(!)  No significant delta [CP]   0401 Lipase:    Lipase 27  WNL [CP]   0401 CMP(!):    Sodium 141   Potassium 3.9   Chloride 103   CARBON DIOXIDE 28   Anion Gap 10   Glucose 104(!)   BUN,BUNPL 26(!)   Creatinine 1.7(!)   Est, Glom Filt Rate 30(!)   Calcium 10.3(!)   Total Protein 6.9   Albumin 4.1   Total Bilirubin 0.2   Alkaline Phosphatase 85   ALT 21   AST 22  Creatinine at baseline; no significant electrolyte derangements [CP]   0512 Patient ambulated well without difficulty in ED. [CP]      ED Course User Index  [CP] Dilma Gayle DO  [FG] Ricky Veliz DO       Medical Decision Making  Magaly is an 79 y.o. female presenting to the ED with c/o abd pain and leg swelling. She notes abd pain ongoing the past 2 weeks and BLE edema chronic. Given her sx, concern for DDX as listed below. Therefore, workup was obtained. Labs as documented in ED course.  Did reveal urinary tract infection with positive nitrites and small leukocyte esterase otherwise relatively unremarkable with creatinine at baseline.  CT head with no intracranial hemorrhage.  Ultrasound bilateral lower extremities with no DVT.  CT abdomen and pelvis with no acute process.  Chest x-ray with no consolidations concerning for pneumonia. She was given rocephin in ED for UTI. Pt ambulated well without difficulty in ED. Plan to DC to facility and advised close follow-up with PCP and given strict return precautions. She is understanding and agreeable to plan.     Amount and/or Complexity of Data Reviewed  Labs: ordered. Decision-making details documented in ED Course.  Radiology: ordered and independent interpretation performed.  ECG/medicine tests: ordered and independent interpretation performed.    Risk  Prescription drug management.        History From:

## 2025-02-16 NOTE — DISCHARGE INSTR - COC
Continuity of Care Form    Patient Name: Magaly Griffin   :  1945  MRN:  [id number]    Admit date:  2/15/2025  Discharge date:  ***    Code Status Order: Prior   Advance Directives:   Advance Care Flowsheet Documentation             Admitting Physician:  No admitting provider for patient encounter.  PCP: Becky Hill APRN - CNP    Discharging Nurse: ***  Discharging Hospital Unit/Room#: HALL04/H4  Discharging Unit Phone Number: ***    Emergency Contact:   Extended Emergency Contact Information  Primary Emergency Contact: Mayra Adkins  Home Phone: [phone]  Mobile Phone: [phone]  Relation: Legal Guardian   needed? No  Secondary Emergency Contact: Elsa Griffin   [facility]  Home Phone: [phone]  Relation: Child   needed? No    Past Surgical History:  Past Surgical History:   Procedure Laterality Date    CARDIAC SURGERY      CHOLECYSTECTOMY      DIAGNOSTIC CARDIAC CATH LAB PROCEDURE  09/10/2009    LV 70%. stenotic CAD. Previously placed BM stent: LAD-patent. Successful GUILLERMO of LAD: mid    DIAGNOSTIC CARDIAC CATH LAB PROCEDURE  10/11/2007    LVF well preserved, EF 70%. No MR. Patent coronary arteries. Patent stented segments in LAD.    DIAGNOSTIC CARDIAC CATH LAB PROCEDURE  2006    Patent stents in proximal and mid LAD with mild instent stenosis. Mild CAD of mid LAD 30% stenosis. Normal LVSF, EF 60%.     DIAGNOSTIC CARDIAC CATH LAB PROCEDURE  2003    EF and contraction pattern normal. No MR. Normal LVF. Atherosclerotic CAD.     ECHO COMPLETE  2014 EF62%    stage I diastolic    GALLBLADDER SURGERY      HERNIA REPAIR      HIATAL HERNIA REPAIR      HYSTERECTOMY (CERVIX STATUS UNKNOWN)      JOINT REPLACEMENT Bilateral     knee    THYROID SURGERY      THYROIDECTOMY         Immunization History:   Immunization History   Administered Date(s) Administered    Influenza Vaccine, unspecified formulation 2015    Influenza, FLUARIX, FLULAVAL, FLUZONE

## 2025-02-18 LAB
EKG ATRIAL RATE: 57 BPM
EKG P AXIS: -3 DEGREES
EKG P-R INTERVAL: 196 MS
EKG Q-T INTERVAL: 430 MS
EKG QRS DURATION: 86 MS
EKG QTC CALCULATION (BAZETT): 418 MS
EKG R AXIS: 9 DEGREES
EKG T AXIS: 100 DEGREES
EKG VENTRICULAR RATE: 57 BPM

## 2025-02-18 PROCEDURE — 93010 ELECTROCARDIOGRAM REPORT: CPT | Performed by: INTERNAL MEDICINE

## 2025-03-16 ENCOUNTER — APPOINTMENT (OUTPATIENT)
Dept: GENERAL RADIOLOGY | Age: 80
End: 2025-03-16
Payer: MEDICARE

## 2025-03-16 ENCOUNTER — HOSPITAL ENCOUNTER (EMERGENCY)
Age: 80
Discharge: HOME | End: 2025-03-17
Attending: EMERGENCY MEDICINE
Payer: MEDICARE

## 2025-03-16 VITALS
HEART RATE: 60 BPM | SYSTOLIC BLOOD PRESSURE: 138 MMHG | TEMPERATURE: 97.3 F | WEIGHT: 245 LBS | RESPIRATION RATE: 18 BRPM | DIASTOLIC BLOOD PRESSURE: 70 MMHG | OXYGEN SATURATION: 96 % | BODY MASS INDEX: 39.54 KG/M2

## 2025-03-16 DIAGNOSIS — N30.00 ACUTE CYSTITIS WITHOUT HEMATURIA: Primary | ICD-10-CM

## 2025-03-16 DIAGNOSIS — R60.0 BILATERAL LEG EDEMA: ICD-10-CM

## 2025-03-16 LAB
ALBUMIN SERPL-MCNC: 4.1 G/DL (ref 3.5–5.2)
ALP SERPL-CCNC: 96 U/L (ref 35–104)
ALT SERPL-CCNC: 14 U/L (ref 0–32)
ANION GAP SERPL CALCULATED.3IONS-SCNC: 13 MMOL/L (ref 7–16)
AST SERPL-CCNC: 14 U/L (ref 0–31)
BACTERIA URNS QL MICRO: ABNORMAL
BASOPHILS # BLD: 0.05 K/UL (ref 0–0.2)
BASOPHILS NFR BLD: 1 % (ref 0–2)
BILIRUB SERPL-MCNC: 0.3 MG/DL (ref 0–1.2)
BILIRUB UR QL STRIP: NEGATIVE
BNP SERPL-MCNC: 278 PG/ML (ref 0–450)
BUN SERPL-MCNC: 41 MG/DL (ref 6–23)
CALCIUM SERPL-MCNC: 10 MG/DL (ref 8.6–10.2)
CHLORIDE SERPL-SCNC: 109 MMOL/L (ref 98–107)
CLARITY UR: CLEAR
CO2 SERPL-SCNC: 20 MMOL/L (ref 22–29)
COLOR UR: YELLOW
CREAT SERPL-MCNC: 1.5 MG/DL (ref 0.5–1)
EOSINOPHIL # BLD: 0.17 K/UL (ref 0.05–0.5)
EOSINOPHILS RELATIVE PERCENT: 2 % (ref 0–6)
ERYTHROCYTE [DISTWIDTH] IN BLOOD BY AUTOMATED COUNT: 13.5 % (ref 11.5–15)
FLUAV RNA RESP QL NAA+PROBE: NOT DETECTED
FLUBV RNA RESP QL NAA+PROBE: NOT DETECTED
GFR, ESTIMATED: 35 ML/MIN/1.73M2
GLUCOSE SERPL-MCNC: 133 MG/DL (ref 74–99)
GLUCOSE UR STRIP-MCNC: NEGATIVE MG/DL
HCT VFR BLD AUTO: 40.5 % (ref 34–48)
HGB BLD-MCNC: 13.3 G/DL (ref 11.5–15.5)
HGB UR QL STRIP.AUTO: NEGATIVE
IMM GRANULOCYTES # BLD AUTO: 0.04 K/UL (ref 0–0.58)
IMM GRANULOCYTES NFR BLD: 1 % (ref 0–5)
KETONES UR STRIP-MCNC: NEGATIVE MG/DL
LEUKOCYTE ESTERASE UR QL STRIP: ABNORMAL
LYMPHOCYTES NFR BLD: 1.38 K/UL (ref 1.5–4)
LYMPHOCYTES RELATIVE PERCENT: 19 % (ref 20–42)
MCH RBC QN AUTO: 32.9 PG (ref 26–35)
MCHC RBC AUTO-ENTMCNC: 32.8 G/DL (ref 32–34.5)
MCV RBC AUTO: 100.2 FL (ref 80–99.9)
MONOCYTES NFR BLD: 0.49 K/UL (ref 0.1–0.95)
MONOCYTES NFR BLD: 7 % (ref 2–12)
NEUTROPHILS NFR BLD: 71 % (ref 43–80)
NEUTS SEG NFR BLD: 5.34 K/UL (ref 1.8–7.3)
NITRITE UR QL STRIP: POSITIVE
PH UR STRIP: 6 [PH] (ref 5–8)
PLATELET # BLD AUTO: 220 K/UL (ref 130–450)
PMV BLD AUTO: 8.9 FL (ref 7–12)
POTASSIUM SERPL-SCNC: 3.9 MMOL/L (ref 3.5–5)
PROT SERPL-MCNC: 7.1 G/DL (ref 6.4–8.3)
PROT UR STRIP-MCNC: NEGATIVE MG/DL
RBC # BLD AUTO: 4.04 M/UL (ref 3.5–5.5)
RBC #/AREA URNS HPF: ABNORMAL /HPF
SARS-COV-2 RNA RESP QL NAA+PROBE: NOT DETECTED
SODIUM SERPL-SCNC: 142 MMOL/L (ref 132–146)
SOURCE: NORMAL
SP GR UR STRIP: 1.01 (ref 1–1.03)
SPECIMEN DESCRIPTION: NORMAL
TROPONIN I SERPL HS-MCNC: 13 NG/L (ref 0–9)
TROPONIN I SERPL HS-MCNC: 13 NG/L (ref 0–9)
UROBILINOGEN UR STRIP-ACNC: 0.2 EU/DL (ref 0–1)
WBC #/AREA URNS HPF: ABNORMAL /HPF
WBC OTHER # BLD: 7.5 K/UL (ref 4.5–11.5)

## 2025-03-16 PROCEDURE — 80053 COMPREHEN METABOLIC PANEL: CPT

## 2025-03-16 PROCEDURE — 85025 COMPLETE CBC W/AUTO DIFF WBC: CPT

## 2025-03-16 PROCEDURE — 83880 ASSAY OF NATRIURETIC PEPTIDE: CPT

## 2025-03-16 PROCEDURE — 87077 CULTURE AEROBIC IDENTIFY: CPT

## 2025-03-16 PROCEDURE — 87086 URINE CULTURE/COLONY COUNT: CPT

## 2025-03-16 PROCEDURE — 6360000002 HC RX W HCPCS: Performed by: EMERGENCY MEDICINE

## 2025-03-16 PROCEDURE — 87636 SARSCOV2 & INF A&B AMP PRB: CPT

## 2025-03-16 PROCEDURE — 71045 X-RAY EXAM CHEST 1 VIEW: CPT

## 2025-03-16 PROCEDURE — 93005 ELECTROCARDIOGRAM TRACING: CPT | Performed by: EMERGENCY MEDICINE

## 2025-03-16 PROCEDURE — 81001 URINALYSIS AUTO W/SCOPE: CPT

## 2025-03-16 PROCEDURE — 84484 ASSAY OF TROPONIN QUANT: CPT

## 2025-03-16 PROCEDURE — 99285 EMERGENCY DEPT VISIT HI MDM: CPT

## 2025-03-16 PROCEDURE — 96374 THER/PROPH/DIAG INJ IV PUSH: CPT

## 2025-03-16 RX ORDER — CEFDINIR 300 MG/1
300 CAPSULE ORAL 2 TIMES DAILY
Qty: 10 CAPSULE | Refills: 0 | Status: SHIPPED | OUTPATIENT
Start: 2025-03-16 | End: 2025-03-21

## 2025-03-16 RX ORDER — FUROSEMIDE 10 MG/ML
40 INJECTION INTRAMUSCULAR; INTRAVENOUS ONCE
Status: COMPLETED | OUTPATIENT
Start: 2025-03-16 | End: 2025-03-16

## 2025-03-16 RX ADMIN — FUROSEMIDE 40 MG: 10 INJECTION, SOLUTION INTRAMUSCULAR; INTRAVENOUS at 19:34

## 2025-03-16 NOTE — ED PROVIDER NOTES
allergies.    -------------------------------------------------- RESULTS -------------------------------------------------  Labs:  Results for orders placed or performed during the hospital encounter of 03/16/25   COVID-19 & Influenza Combo    Specimen: Nasopharyngeal Swab   Result Value Ref Range    Specimen Description .NASOPHARYNGEAL SWAB     Source .NASOPHARYNGEAL SWAB     SARS-CoV-2 RNA, RT PCR Not Detected Not Detected    Influenza A Not Detected Not Detected    Influenza B Not Detected Not Detected   CBC with Auto Differential   Result Value Ref Range    WBC 7.5 4.5 - 11.5 k/uL    RBC 4.04 3.50 - 5.50 m/uL    Hemoglobin 13.3 11.5 - 15.5 g/dL    Hematocrit 40.5 34.0 - 48.0 %    .2 (H) 80.0 - 99.9 fL    MCH 32.9 26.0 - 35.0 pg    MCHC 32.8 32.0 - 34.5 g/dL    RDW 13.5 11.5 - 15.0 %    Platelets 220 130 - 450 k/uL    MPV 8.9 7.0 - 12.0 fL    Neutrophils % 71 43.0 - 80.0 %    Lymphocytes % 19 (L) 20.0 - 42.0 %    Monocytes % 7 2.0 - 12.0 %    Eosinophils % 2 0 - 6 %    Basophils % 1 0.0 - 2.0 %    Immature Granulocytes % 1 0.0 - 5.0 %    Neutrophils Absolute 5.34 1.80 - 7.30 k/uL    Lymphocytes Absolute 1.38 (L) 1.50 - 4.00 k/uL    Monocytes Absolute 0.49 0.10 - 0.95 k/uL    Eosinophils Absolute 0.17 0.05 - 0.50 k/uL    Basophils Absolute 0.05 0.00 - 0.20 k/uL    Immature Granulocytes Absolute 0.04 0.00 - 0.58 k/uL   Comprehensive Metabolic Panel   Result Value Ref Range    Sodium 142 132 - 146 mmol/L    Potassium 3.9 3.5 - 5.0 mmol/L    Chloride 109 (H) 98 - 107 mmol/L    CO2 20 (L) 22 - 29 mmol/L    Anion Gap 13 7 - 16 mmol/L    Glucose 133 (H) 74 - 99 mg/dL    BUN 41 (H) 6 - 23 mg/dL    Creatinine 1.5 (H) 0.50 - 1.00 mg/dL    Est, Glom Filt Rate 35 (L) >60 mL/min/1.73m2    Calcium 10.0 8.6 - 10.2 mg/dL    Total Protein 7.1 6.4 - 8.3 g/dL    Albumin 4.1 3.5 - 5.2 g/dL    Total Bilirubin 0.3 0.0 - 1.2 mg/dL    Alkaline Phosphatase 96 35 - 104 U/L    ALT 14 0 - 32 U/L    AST 14 0 - 31 U/L   Troponin

## 2025-03-17 LAB
EKG ATRIAL RATE: 67 BPM
EKG P AXIS: 47 DEGREES
EKG P-R INTERVAL: 228 MS
EKG Q-T INTERVAL: 448 MS
EKG QRS DURATION: 162 MS
EKG QTC CALCULATION (BAZETT): 473 MS
EKG R AXIS: 44 DEGREES
EKG T AXIS: 22 DEGREES
EKG VENTRICULAR RATE: 67 BPM

## 2025-03-17 PROCEDURE — 2500000003 HC RX 250 WO HCPCS: Performed by: EMERGENCY MEDICINE

## 2025-03-17 PROCEDURE — 6360000002 HC RX W HCPCS: Performed by: EMERGENCY MEDICINE

## 2025-03-17 PROCEDURE — 96375 TX/PRO/DX INJ NEW DRUG ADDON: CPT

## 2025-03-17 PROCEDURE — 93010 ELECTROCARDIOGRAM REPORT: CPT | Performed by: INTERNAL MEDICINE

## 2025-03-17 RX ADMIN — WATER 1000 MG: 1 INJECTION INTRAMUSCULAR; INTRAVENOUS; SUBCUTANEOUS at 00:19

## 2025-03-19 ENCOUNTER — RESULTS FOLLOW-UP (OUTPATIENT)
Dept: EMERGENCY DEPT | Age: 80
End: 2025-03-19

## 2025-03-19 LAB
MICROORGANISM SPEC CULT: ABNORMAL
SERVICE CMNT-IMP: ABNORMAL
SPECIMEN DESCRIPTION: ABNORMAL

## 2025-06-10 ENCOUNTER — APPOINTMENT (OUTPATIENT)
Dept: CT IMAGING | Age: 80
End: 2025-06-10
Attending: EMERGENCY MEDICINE
Payer: MEDICARE

## 2025-06-10 ENCOUNTER — HOSPITAL ENCOUNTER (EMERGENCY)
Age: 80
Discharge: HOME | End: 2025-06-11
Attending: EMERGENCY MEDICINE
Payer: MEDICARE

## 2025-06-10 DIAGNOSIS — A49.9 UTI (URINARY TRACT INFECTION), BACTERIAL: ICD-10-CM

## 2025-06-10 DIAGNOSIS — R10.9 ABDOMINAL PAIN, UNSPECIFIED ABDOMINAL LOCATION: Primary | ICD-10-CM

## 2025-06-10 DIAGNOSIS — N39.0 UTI (URINARY TRACT INFECTION), BACTERIAL: ICD-10-CM

## 2025-06-10 LAB
ALBUMIN SERPL-MCNC: 3.6 G/DL (ref 3.5–5.2)
ALP SERPL-CCNC: 93 U/L (ref 35–104)
ALT SERPL-CCNC: 14 U/L (ref 0–35)
ANION GAP SERPL CALCULATED.3IONS-SCNC: 10 MMOL/L (ref 7–16)
AST SERPL-CCNC: 20 U/L (ref 0–35)
BASOPHILS # BLD: 0.07 K/UL (ref 0–0.2)
BASOPHILS NFR BLD: 1 % (ref 0–2)
BILIRUB SERPL-MCNC: <0.2 MG/DL (ref 0–1.2)
BUN SERPL-MCNC: 26 MG/DL (ref 8–23)
CALCIUM SERPL-MCNC: 9.3 MG/DL (ref 8.8–10.2)
CHLORIDE SERPL-SCNC: 109 MMOL/L (ref 98–107)
CO2 SERPL-SCNC: 23 MMOL/L (ref 22–29)
CREAT SERPL-MCNC: 1.5 MG/DL (ref 0.5–1)
EOSINOPHIL # BLD: 0.27 K/UL (ref 0.05–0.5)
EOSINOPHILS RELATIVE PERCENT: 4 % (ref 0–6)
ERYTHROCYTE [DISTWIDTH] IN BLOOD BY AUTOMATED COUNT: 13.2 % (ref 11.5–15)
GFR, ESTIMATED: 36 ML/MIN/1.73M2
GLUCOSE SERPL-MCNC: 104 MG/DL (ref 74–99)
HCT VFR BLD AUTO: 39.5 % (ref 34–48)
HGB BLD-MCNC: 12.8 G/DL (ref 11.5–15.5)
IMM GRANULOCYTES # BLD AUTO: 0.05 K/UL (ref 0–0.58)
IMM GRANULOCYTES NFR BLD: 1 % (ref 0–5)
LACTATE BLDV-SCNC: 0.7 MMOL/L (ref 0.5–2.2)
LIPASE SERPL-CCNC: 27 U/L (ref 13–60)
LYMPHOCYTES NFR BLD: 1.35 K/UL (ref 1.5–4)
LYMPHOCYTES RELATIVE PERCENT: 18 % (ref 20–42)
MCH RBC QN AUTO: 32.3 PG (ref 26–35)
MCHC RBC AUTO-ENTMCNC: 32.4 G/DL (ref 32–34.5)
MCV RBC AUTO: 99.7 FL (ref 80–99.9)
MONOCYTES NFR BLD: 0.65 K/UL (ref 0.1–0.95)
MONOCYTES NFR BLD: 8 % (ref 2–12)
NEUTROPHILS NFR BLD: 69 % (ref 43–80)
NEUTS SEG NFR BLD: 5.34 K/UL (ref 1.8–7.3)
PLATELET # BLD AUTO: 237 K/UL (ref 130–450)
PMV BLD AUTO: 9.2 FL (ref 7–12)
POTASSIUM SERPL-SCNC: 4.2 MMOL/L (ref 3.5–5.1)
PROT SERPL-MCNC: 6.2 G/DL (ref 6.4–8.3)
RBC # BLD AUTO: 3.96 M/UL (ref 3.5–5.5)
SODIUM SERPL-SCNC: 143 MMOL/L (ref 136–145)
WBC OTHER # BLD: 7.7 K/UL (ref 4.5–11.5)

## 2025-06-10 PROCEDURE — 80053 COMPREHEN METABOLIC PANEL: CPT

## 2025-06-10 PROCEDURE — 83605 ASSAY OF LACTIC ACID: CPT

## 2025-06-10 PROCEDURE — 74176 CT ABD & PELVIS W/O CONTRAST: CPT

## 2025-06-10 PROCEDURE — 93005 ELECTROCARDIOGRAM TRACING: CPT | Performed by: EMERGENCY MEDICINE

## 2025-06-10 PROCEDURE — 85025 COMPLETE CBC W/AUTO DIFF WBC: CPT

## 2025-06-10 PROCEDURE — 83690 ASSAY OF LIPASE: CPT

## 2025-06-10 PROCEDURE — 99284 EMERGENCY DEPT VISIT MOD MDM: CPT

## 2025-06-10 RX ORDER — DICYCLOMINE HYDROCHLORIDE 10 MG/ML
20 INJECTION INTRAMUSCULAR ONCE
Status: COMPLETED | OUTPATIENT
Start: 2025-06-11 | End: 2025-06-11

## 2025-06-10 ASSESSMENT — PAIN - FUNCTIONAL ASSESSMENT: PAIN_FUNCTIONAL_ASSESSMENT: NONE - DENIES PAIN

## 2025-06-11 VITALS
DIASTOLIC BLOOD PRESSURE: 65 MMHG | HEART RATE: 63 BPM | OXYGEN SATURATION: 95 % | RESPIRATION RATE: 19 BRPM | SYSTOLIC BLOOD PRESSURE: 133 MMHG | TEMPERATURE: 97.9 F

## 2025-06-11 LAB
BACTERIA URNS QL MICRO: ABNORMAL
BILIRUB UR QL STRIP: NEGATIVE
CLARITY UR: ABNORMAL
COLOR UR: YELLOW
EKG ATRIAL RATE: 60 BPM
EKG P AXIS: 42 DEGREES
EKG P-R INTERVAL: 252 MS
EKG Q-T INTERVAL: 438 MS
EKG QRS DURATION: 102 MS
EKG QTC CALCULATION (BAZETT): 438 MS
EKG R AXIS: 39 DEGREES
EKG T AXIS: 54 DEGREES
EKG VENTRICULAR RATE: 60 BPM
GLUCOSE UR STRIP-MCNC: NEGATIVE MG/DL
HGB UR QL STRIP.AUTO: NEGATIVE
KETONES UR STRIP-MCNC: NEGATIVE MG/DL
LEUKOCYTE ESTERASE UR QL STRIP: ABNORMAL
NITRITE UR QL STRIP: POSITIVE
PH UR STRIP: 7.5 [PH] (ref 5–8)
PROT UR STRIP-MCNC: NEGATIVE MG/DL
RBC #/AREA URNS HPF: ABNORMAL /HPF
SP GR UR STRIP: 1.01 (ref 1–1.03)
UROBILINOGEN UR STRIP-ACNC: 1 EU/DL (ref 0–1)
WBC #/AREA URNS HPF: ABNORMAL /HPF

## 2025-06-11 PROCEDURE — 87077 CULTURE AEROBIC IDENTIFY: CPT

## 2025-06-11 PROCEDURE — 93010 ELECTROCARDIOGRAM REPORT: CPT | Performed by: INTERNAL MEDICINE

## 2025-06-11 PROCEDURE — 96375 TX/PRO/DX INJ NEW DRUG ADDON: CPT

## 2025-06-11 PROCEDURE — 96374 THER/PROPH/DIAG INJ IV PUSH: CPT

## 2025-06-11 PROCEDURE — 2500000003 HC RX 250 WO HCPCS: Performed by: EMERGENCY MEDICINE

## 2025-06-11 PROCEDURE — 87086 URINE CULTURE/COLONY COUNT: CPT

## 2025-06-11 PROCEDURE — 81001 URINALYSIS AUTO W/SCOPE: CPT

## 2025-06-11 PROCEDURE — 6360000002 HC RX W HCPCS: Performed by: EMERGENCY MEDICINE

## 2025-06-11 PROCEDURE — 96372 THER/PROPH/DIAG INJ SC/IM: CPT

## 2025-06-11 PROCEDURE — 87040 BLOOD CULTURE FOR BACTERIA: CPT

## 2025-06-11 RX ORDER — CEFDINIR 300 MG/1
300 CAPSULE ORAL 2 TIMES DAILY
Qty: 14 CAPSULE | Refills: 0 | Status: SHIPPED | OUTPATIENT
Start: 2025-06-11 | End: 2025-06-18

## 2025-06-11 RX ADMIN — WATER 1000 MG: 1 INJECTION INTRAMUSCULAR; INTRAVENOUS; SUBCUTANEOUS at 02:34

## 2025-06-11 RX ADMIN — DICYCLOMINE HYDROCHLORIDE 20 MG: 10 INJECTION, SOLUTION INTRAMUSCULAR at 00:41

## 2025-06-11 RX ADMIN — FAMOTIDINE 20 MG: 10 INJECTION, SOLUTION INTRAVENOUS at 00:41

## 2025-06-11 ASSESSMENT — PAIN DESCRIPTION - DESCRIPTORS: DESCRIPTORS: SPASM;SQUEEZING;TIGHTNESS

## 2025-06-11 ASSESSMENT — PAIN - FUNCTIONAL ASSESSMENT: PAIN_FUNCTIONAL_ASSESSMENT: ACTIVITIES ARE NOT PREVENTED

## 2025-06-11 ASSESSMENT — PAIN DESCRIPTION - LOCATION: LOCATION: ABDOMEN

## 2025-06-11 NOTE — ED PROVIDER NOTES
HPI:  6/10/25, Time: 8:11 PM EDT         Magaly Griffin is a 80 y.o. female has a past medical history of Acute bronchitis, Acute on chronic diastolic (congestive) heart failure (HCC), Acute renal failure, Acute renal failure with acute cortical necrosis, Acute-on-chronic kidney injury, Arthritis, Atrial fibrillation (HCC), CAD (coronary artery disease), Cancer (HCC), CHF (congestive heart failure) (HCC), Dementia (HCC), Depression, Diabetes mellitus (HCC), Enteritis, H/O echocardiogram, History of blood transfusion, History of cardiovascular stress test, History of cardiovascular stress test, History of echocardiogram, History of echocardiogram, Hyperkalemia, Hyperlipidemia, Hypertension, 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  presenting to the ED for abdominal pain, beginning 1 day ago.  The complaint has been persistent, moderate in severity, and worsened by nothing.  Patient was having left side abdominal pain for the past day.  Patient has had no vomiting or diarrhea there is no history of black or tarry stools.  Patient reporting pain in the left upper and left lower.  Patient reporting no chest pain.    ROS:   Pertinent positives and negatives are stated within HPI, all other systems reviewed and are negative.  --------------------------------------------- PAST HISTORY ---------------------------------------------  Past Medical History:  has a past medical history of Acute bronchitis, Acute on chronic diastolic (congestive) heart failure (HCC), Acute renal failure, Acute renal failure with acute cortical necrosis, Acute-on-chronic kidney injury, Arthritis, Atrial fibrillation (HCC), CAD (coronary artery disease), Cancer (HCC), CHF (congestive heart failure) (HCC), Dementia (HCC),

## 2025-06-13 ENCOUNTER — RESULTS FOLLOW-UP (OUTPATIENT)
Dept: PHARMACY | Age: 80
End: 2025-06-13

## 2025-06-13 LAB
MICROORGANISM SPEC CULT: ABNORMAL
MICROORGANISM SPEC CULT: ABNORMAL
SERVICE CMNT-IMP: ABNORMAL
SPECIMEN DESCRIPTION: ABNORMAL

## 2025-06-15 LAB
MICROORGANISM SPEC CULT: NORMAL
MICROORGANISM SPEC CULT: NORMAL
SERVICE CMNT-IMP: NORMAL
SERVICE CMNT-IMP: NORMAL
SPECIMEN DESCRIPTION: NORMAL
SPECIMEN DESCRIPTION: NORMAL
